# Patient Record
Sex: MALE | Race: WHITE | NOT HISPANIC OR LATINO | Employment: OTHER | URBAN - METROPOLITAN AREA
[De-identification: names, ages, dates, MRNs, and addresses within clinical notes are randomized per-mention and may not be internally consistent; named-entity substitution may affect disease eponyms.]

---

## 2017-04-06 ENCOUNTER — OFFICE VISIT (OUTPATIENT)
Dept: PODIATRY | Facility: CLINIC | Age: 72
End: 2017-04-06
Payer: MEDICARE

## 2017-04-06 VITALS — BODY MASS INDEX: 31.24 KG/M2 | HEIGHT: 74 IN | WEIGHT: 243.38 LBS

## 2017-04-06 DIAGNOSIS — L84 CORN OR CALLUS: Primary | ICD-10-CM

## 2017-04-06 DIAGNOSIS — M77.9 CAPSULITIS: ICD-10-CM

## 2017-04-06 DIAGNOSIS — M79.672 FOOT PAIN, LEFT: ICD-10-CM

## 2017-04-06 PROCEDURE — 1159F MED LIST DOCD IN RCRD: CPT | Mod: S$GLB,,, | Performed by: PODIATRIST

## 2017-04-06 PROCEDURE — 1157F ADVNC CARE PLAN IN RCRD: CPT | Mod: S$GLB,,, | Performed by: PODIATRIST

## 2017-04-06 PROCEDURE — 1126F AMNT PAIN NOTED NONE PRSNT: CPT | Mod: S$GLB,,, | Performed by: PODIATRIST

## 2017-04-06 PROCEDURE — 99999 PR PBB SHADOW E&M-EST. PATIENT-LVL II: CPT | Mod: PBBFAC,,, | Performed by: PODIATRIST

## 2017-04-06 PROCEDURE — 99213 OFFICE O/P EST LOW 20 MIN: CPT | Mod: S$GLB,,, | Performed by: PODIATRIST

## 2017-04-06 PROCEDURE — 1160F RVW MEDS BY RX/DR IN RCRD: CPT | Mod: S$GLB,,, | Performed by: PODIATRIST

## 2017-04-06 RX ORDER — AMMONIUM LACTATE 12 G/100G
1 CREAM TOPICAL 2 TIMES DAILY
Qty: 140 G | Refills: 11 | Status: SHIPPED | OUTPATIENT
Start: 2017-04-06 | End: 2020-01-01

## 2017-04-06 NOTE — MR AVS SNAPSHOT
[facility]  [address] [place] [address]  Phone: [phone]                  Anson Do   2017 9:45 AM   Office Visit    Description:  Male : 1945   Provider:  Nikita Kerns DPM   Department:  [facility]           Reason for Visit     Callouses           Diagnoses this Visit        Comments    Corn or callus    -  Primary     Capsulitis         Foot pain, left                To Do List           Goals (5 Years of Data)     None      Follow-Up and Disposition     Return if symptoms worsen or fail to improve.       These Medications        Disp Refills Start End    ammonium lactate 12 % Crea 140 g 11 2017     Apply 1 application topically 2 (two) times daily. - Topical (Top)    Pharmacy: [id number] - [address] #: [phone]         [facility]     [facility] On Call Nurse Care Line -  Assistance  Unless otherwise directed by your provider, please contact [facility], our nurse care line that is available for  assistance.     Registered nurses in the [facility] On Call Center provide: appointment scheduling, clinical advisement, health education, and other advisory services.  Call: [phone] (toll free)               Medications           Message regarding Medications     Verify the changes and/or additions to your medication regime listed below are the same as discussed with your clinician today.  If any of these changes or additions are incorrect, please notify your healthcare provider.        START taking these NEW medications        Refills    ammonium lactate 12 % Crea 11    Sig: Apply 1 application topically 2 (two) times daily.    Class: Normal    Route: Topical (Top)      STOP taking these medications     meloxicam (MOBIC) 7.5 MG tablet     diclofenac sodium 1 % Gel Apply 2 g topically 4 (four) times daily.    econazole nitrate 1 % cream            Verify that the below list of medications is an  "accurate representation of the medications you are currently taking.  If none reported, the list may be blank. If incorrect, please contact your healthcare provider. Carry this list with you in case of emergency.           Current Medications     fexofenadine (ALLEGRA) 180 MG tablet Take 180 mg by mouth once daily.    ammonium lactate 12 % Crea Apply 1 application topically 2 (two) times daily.           Clinical Reference Information           Your Vitals Were     Height Weight BMI          6' 2" (1.88 m) 110.4 kg (243 lb 6.2 oz) 31.25 kg/m2        Allergies as of 4/6/2017     No Known Allergies      Immunizations Administered on Date of Encounter - 4/6/2017     None      Language Assistance Services     ATTENTION: Language assistance services are available, free of charge. Please call [phone].      ATENCIÓN: Si noemi fernando, tiene a corbin disposición servicios gratuitos de asistencia lingüística. Llame al [phone].     ERON Ý: N?u b?n nói Ti?ng Vi?t, có các d?ch v? h? tr? ngôn ng? mi?n phí dành cho b?n. G?i s? [phone].         [facility] complies with applicable Federal civil rights laws and does not discriminate on the basis of race, color, national origin, age, disability, or sex.        "

## 2017-04-06 NOTE — PROGRESS NOTES
Subjective:      Patient ID: Anson Do is a 71 y.o. male.    Chief Complaint: Callouses (left)    Sharp deep pain bottom left forefoot.  Gradual onset, worsening over past several weeks, aggravated by increased weight bearing, shoe gear, pressure.  No previous medical treatment.  OTC pain med not helping.      Review of Systems   Constitution: Negative for chills, diaphoresis, fever, malaise/fatigue and night sweats.   Cardiovascular: Negative for claudication, cyanosis, leg swelling and syncope.   Skin: Negative for color change, dry skin, rash, suspicious lesions and unusual hair distribution.   Musculoskeletal: Positive for joint pain. Negative for falls, joint swelling, muscle cramps, muscle weakness and stiffness.   Gastrointestinal: Negative for constipation, diarrhea, nausea and vomiting.   Neurological: Negative for brief paralysis, disturbances in coordination, focal weakness, numbness, paresthesias, sensory change and tremors.           Objective:      Physical Exam   Constitutional: He appears well-developed and well-nourished. He is cooperative. No distress.   Cardiovascular:   Pulses:       Popliteal pulses are 2+ on the right side, and 2+ on the left side.        Dorsalis pedis pulses are 1+ on the right side, and 1+ on the left side.        Posterior tibial pulses are 1+ on the right side, and 1+ on the left side.   Capillary refill 3 seconds all toes/distal feet, all toes/both feet warm to touch.      Negative lymphadenopathy bilateral popliteal fossa and tarsal tunnel.      Negavie lower extremity edema bilateral.     Musculoskeletal:        Right ankle: Normal. He exhibits normal range of motion, no swelling, no ecchymosis, no deformity, no laceration and normal pulse. Achilles tendon normal. Achilles tendon exhibits no pain, no defect and normal Langford's test results.   Sharp deep pain to palpation inferior 5th mtpj left without ecchymosis, erythema, edema, or cardinal signs infection, and no  signs of trauma.    Pain to palpation inferior mtpj 5 left without evidence of trauma or infection.       Ankle dorsiflexion decreased at <10 degrees bilateral with moderate increase with knee flexion bilateral.      Otherwise, .Normal angle, base, station of gait. All ten toes without clubbing, cyanosis, or signs of ischemia.  No pain to palpation bilateral lower extremities.  Range of motion, stability, muscle strength, and muscle tone normal bilateral feet and legs.     Lymphadenopathy: No inguinal adenopathy noted on the right or left side.   Negative lymphadenopathy bilateral popliteal fossa and tarsal tunnel.   Neurological: He is alert. He has normal strength. He displays no atrophy and no tremor. No sensory deficit. He exhibits normal muscle tone. He displays no seizure activity. Gait normal.   Reflex Scores:       Patellar reflexes are 2+ on the right side and 2+ on the left side.       Achilles reflexes are 2+ on the right side and 2+ on the left side.  Negative tinel sign to percussion sural, superficial peroneal, deep peroneal, saphenous, and posterior tibial nerves right and left ankles and feet.     Skin: Skin is warm, dry and intact. No abrasion, no bruising, no burn, no ecchymosis, no laceration, no lesion and no rash noted. He is not diaphoretic. No cyanosis or erythema. No pallor. Nails show no clubbing.   Focal hyperkeratotic lesion with minimal/moderate build up consisting entirely of hyperkeratotic tissue without open skin, drainage, pus, fluctuance, malodor, or signs of infection      Skin is normal age and health appropriate color, turgor, texture, and temperature bilateral lower extremities without ulceration, hyperpigmentation, discoloration, masses nodules or cords palpated.  No ecchymosis, erythema, edema, or cardinal signs of infection bilateral lower extremities.               Assessment:       Encounter Diagnoses   Name Primary?    Corn or callus Yes    Capsulitis     Foot pain, left           Plan:       Anson COOLEY was seen today for callouses.    Diagnoses and all orders for this visit:    Corn or callus    Capsulitis    Foot pain, left    Other orders  -     ammonium lactate 12 % Crea; Apply 1 application topically 2 (two) times daily.      I counseled the patient on his conditions, their implications and medical management.        Patient will stretch the tendo achilles complex three times daily as demonstrated in the office.  Literature was dispensed illustrating proper stretching technique.    Declines strapping.    Discussed conservative treatment with shoes of adequate dimensions, material, and style to alleviate symptoms and delay or prevent surgical intervention.      Rx lachydrin    Patient will obtain over the counter arch supports and wear them in shoes whenever possible.  Athletic shoes intended for walking or running are usually best.            Return if symptoms worsen or fail to improve.

## 2017-06-21 ENCOUNTER — OFFICE VISIT (OUTPATIENT)
Dept: PODIATRY | Facility: CLINIC | Age: 72
End: 2017-06-21
Payer: MEDICARE

## 2017-06-21 VITALS — WEIGHT: 239.88 LBS | HEIGHT: 74 IN | BODY MASS INDEX: 30.79 KG/M2

## 2017-06-21 DIAGNOSIS — M79.671 FOOT PAIN, RIGHT: ICD-10-CM

## 2017-06-21 DIAGNOSIS — M10.9 ACUTE GOUT INVOLVING TOE OF RIGHT FOOT, UNSPECIFIED CAUSE: Primary | ICD-10-CM

## 2017-06-21 PROCEDURE — 99999 PR PBB SHADOW E&M-EST. PATIENT-LVL III: CPT | Mod: PBBFAC,,, | Performed by: PODIATRIST

## 2017-06-21 PROCEDURE — 1125F AMNT PAIN NOTED PAIN PRSNT: CPT | Mod: S$GLB,,, | Performed by: PODIATRIST

## 2017-06-21 PROCEDURE — 1159F MED LIST DOCD IN RCRD: CPT | Mod: S$GLB,,, | Performed by: PODIATRIST

## 2017-06-21 PROCEDURE — 99213 OFFICE O/P EST LOW 20 MIN: CPT | Mod: S$GLB,,, | Performed by: PODIATRIST

## 2017-06-21 RX ORDER — METHYLPREDNISOLONE 4 MG/1
TABLET ORAL
Qty: 1 PACKAGE | Refills: 0 | Status: SHIPPED | OUTPATIENT
Start: 2017-06-21 | End: 2019-01-01

## 2017-06-21 NOTE — PROGRESS NOTES
Reviewed resident note, exam and procedures were performed under my direct supervision.  Agree with note and care.  Discrepancies discussed.    Declines injection/xray today.    Rx medrol.    Will reappoint if injection desired.

## 2017-06-21 NOTE — PROGRESS NOTES
Subjective:      Patient ID: Anson Do is a 71 y.o. male.    Chief Complaint: Foot Problem (gout - right foot) and Foot Pain (right foot)    Sharp deep pain right great toe.  Sudden onset about a week ago, aggravated by increased pressure to the area, shoe gear. He has a history of gout flare ups. Prescribed colchicine previously by his PCP, he has been taking that over the last week, feels it is helping so the pain does not worsen, but the pain is not resolving fully. Also increased his water intake.      Review of Systems   Constitution: Negative for chills, diaphoresis, fever, malaise/fatigue and night sweats.   Cardiovascular: Negative for claudication, cyanosis, leg swelling and syncope.   Skin: Positive for color change. Negative for dry skin, rash, suspicious lesions and unusual hair distribution.   Musculoskeletal: Positive for gout, joint pain and joint swelling. Negative for falls, muscle cramps, muscle weakness and stiffness.   Gastrointestinal: Negative for constipation, diarrhea, nausea and vomiting.   Neurological: Negative for brief paralysis, disturbances in coordination, focal weakness, numbness, paresthesias, sensory change and tremors.           Objective:      Physical Exam   Constitutional: He appears well-developed and well-nourished. He is cooperative. No distress.   Cardiovascular:   Pulses:       Popliteal pulses are 2+ on the right side, and 2+ on the left side.        Dorsalis pedis pulses are 1+ on the right side, and 1+ on the left side.        Posterior tibial pulses are 1+ on the right side, and 1+ on the left side.   Capillary refill 3 seconds all toes/distal feet, all toes/both feet warm to touch.      Negative lymphadenopathy bilateral popliteal fossa and tarsal tunnel.      Negavie lower extremity edema bilateral.     Musculoskeletal:        Right ankle: Normal. He exhibits normal range of motion, no swelling, no ecchymosis, no deformity, no laceration and normal pulse. Achilles  tendon normal. Achilles tendon exhibits no pain, no defect and normal Langford's test results.   Sharp deep pain to palpation right first MTPJ, pain throughout ROM right first MTPJ    Ankle dorsiflexion decreased at <10 degrees bilateral with moderate increase with knee flexion bilateral.      Otherwise, .Normal angle, base, station of gait. All ten toes without clubbing, cyanosis, or signs of ischemia.  No pain to palpation bilateral lower extremities.  Range of motion, stability, muscle strength, and muscle tone normal bilateral feet and legs.     Lymphadenopathy: No inguinal adenopathy noted on the right or left side.   Negative lymphadenopathy bilateral popliteal fossa and tarsal tunnel.   Neurological: He is alert. He has normal strength. He displays no atrophy and no tremor. No sensory deficit. He exhibits normal muscle tone. He displays no seizure activity. Gait normal.   Reflex Scores:       Patellar reflexes are 2+ on the right side and 2+ on the left side.       Achilles reflexes are 2+ on the right side and 2+ on the left side.  Negative tinel sign to percussion sural, superficial peroneal, deep peroneal, saphenous, and posterior tibial nerves right and left ankles and feet.     Skin: Skin is warm, dry and intact. No abrasion, no bruising, no burn, no ecchymosis, no laceration, no lesion and no rash noted. He is not diaphoretic. There is erythema. No cyanosis. No pallor. Nails show no clubbing.   Focal erythema and edema surrounding right first MTPJ.    Otherwise, Skin is normal age and health appropriate color, turgor, texture, and temperature bilateral lower extremities without ulceration, hyperpigmentation, discoloration, masses nodules or cords palpated.  No ecchymosis, erythema, edema, or cardinal signs of infection bilateral lower extremities.           Assessment:       Encounter Diagnoses   Name Primary?    Acute gout involving toe of right foot, unspecified cause - Right Foot Yes    Foot pain,  right          Plan:       Anson COOLEY was seen today for foot problem and foot pain.    Diagnoses and all orders for this visit:    Acute gout involving toe of right foot, unspecified cause - Right Foot    Foot pain, right    Other orders  -     methylPREDNISolone (MEDROL DOSEPACK) 4 mg tablet; use as directed      I counseled the patient on his conditions, their implications and medical management.    The nature of gout is fully explained, including dietary relationship, acute and interval phase and treatment of both. Long term complications such as kidney stones, tophi and arthritis are discussed. Avoidance of alcohol recommended. Indications for the use colchicine to prevent or treat flare-ups is also discussed. Proper use of indomethacin for acute attacks discussed, and its side effects. Will add on treatment with medrol dose pack for inflammation and pain. Call if further attacks occur, or this one does not resolve promptly.    Warm packs affected part and limb foot/kd - protect skin against thermal damage with towel/other insulator.    Adequately hydrate up to about 4 liters water unless on fluid restriction.    Avoid purine containing foods - list provided.      Follow up 1 week or sooner PRRADHIKA Mitchell DPM PGY-3

## 2019-01-01 ENCOUNTER — OFFICE VISIT (OUTPATIENT)
Dept: ORTHOPEDICS | Facility: CLINIC | Age: 74
End: 2019-01-01
Payer: MEDICARE

## 2019-01-01 VITALS
BODY MASS INDEX: 32.85 KG/M2 | HEIGHT: 74 IN | SYSTOLIC BLOOD PRESSURE: 123 MMHG | DIASTOLIC BLOOD PRESSURE: 77 MMHG | HEART RATE: 60 BPM | WEIGHT: 256 LBS

## 2019-01-01 VITALS
BODY MASS INDEX: 32.21 KG/M2 | SYSTOLIC BLOOD PRESSURE: 133 MMHG | WEIGHT: 251 LBS | DIASTOLIC BLOOD PRESSURE: 81 MMHG | HEIGHT: 74 IN | HEART RATE: 59 BPM

## 2019-01-01 DIAGNOSIS — M51.36 DISC DEGENERATION, LUMBAR: Primary | ICD-10-CM

## 2019-01-01 DIAGNOSIS — M62.830 LUMBAR PARASPINAL MUSCLE SPASM: ICD-10-CM

## 2019-01-01 DIAGNOSIS — M51.36 DISC DEGENERATION, LUMBAR: ICD-10-CM

## 2019-01-01 PROCEDURE — 99203 PR OFFICE/OUTPT VISIT, NEW, LEVL III, 30-44 MIN: ICD-10-PCS | Mod: 25,S$GLB,, | Performed by: ORTHOPAEDIC SURGERY

## 2019-01-01 PROCEDURE — 1159F PR MEDICATION LIST DOCUMENTED IN MEDICAL RECORD: ICD-10-PCS | Mod: S$GLB,,, | Performed by: ORTHOPAEDIC SURGERY

## 2019-01-01 PROCEDURE — 99213 OFFICE O/P EST LOW 20 MIN: CPT | Mod: S$GLB,,, | Performed by: ORTHOPAEDIC SURGERY

## 2019-01-01 PROCEDURE — 1101F PT FALLS ASSESS-DOCD LE1/YR: CPT | Mod: S$GLB,,, | Performed by: ORTHOPAEDIC SURGERY

## 2019-01-01 PROCEDURE — 1125F PR PAIN SEVERITY QUANTIFIED, PAIN PRESENT: ICD-10-PCS | Mod: S$GLB,,, | Performed by: ORTHOPAEDIC SURGERY

## 2019-01-01 PROCEDURE — 1101F PR PT FALLS ASSESS DOC 0-1 FALLS W/OUT INJ PAST YR: ICD-10-PCS | Mod: S$GLB,,, | Performed by: ORTHOPAEDIC SURGERY

## 2019-01-01 PROCEDURE — 1126F PR PAIN SEVERITY QUANTIFIED, NO PAIN PRESENT: ICD-10-PCS | Mod: S$GLB,,, | Performed by: ORTHOPAEDIC SURGERY

## 2019-01-01 PROCEDURE — 1159F MED LIST DOCD IN RCRD: CPT | Mod: S$GLB,,, | Performed by: ORTHOPAEDIC SURGERY

## 2019-01-01 PROCEDURE — 1125F AMNT PAIN NOTED PAIN PRSNT: CPT | Mod: S$GLB,,, | Performed by: ORTHOPAEDIC SURGERY

## 2019-01-01 PROCEDURE — 1126F AMNT PAIN NOTED NONE PRSNT: CPT | Mod: S$GLB,,, | Performed by: ORTHOPAEDIC SURGERY

## 2019-01-01 PROCEDURE — 99203 OFFICE O/P NEW LOW 30 MIN: CPT | Mod: 25,S$GLB,, | Performed by: ORTHOPAEDIC SURGERY

## 2019-01-01 PROCEDURE — 99213 PR OFFICE/OUTPT VISIT, EST, LEVL III, 20-29 MIN: ICD-10-PCS | Mod: S$GLB,,, | Performed by: ORTHOPAEDIC SURGERY

## 2019-01-01 RX ORDER — CYCLOBENZAPRINE HCL 10 MG
10 TABLET ORAL NIGHTLY
Qty: 30 TABLET | Refills: 0 | Status: SHIPPED | OUTPATIENT
Start: 2019-01-01 | End: 2019-01-01 | Stop reason: SDUPTHER

## 2019-01-01 RX ORDER — MELOXICAM 15 MG/1
15 TABLET ORAL DAILY
Qty: 30 TABLET | Refills: 1 | Status: SHIPPED | OUTPATIENT
Start: 2019-01-01 | End: 2019-01-01

## 2019-01-01 RX ORDER — CYCLOBENZAPRINE HCL 10 MG
10 TABLET ORAL NIGHTLY
Qty: 30 TABLET | Refills: 0 | Status: SHIPPED | OUTPATIENT
Start: 2019-01-01 | End: 2020-01-01

## 2019-11-25 NOTE — PROGRESS NOTES
[facility]    Subjective:     Chief Complaint:   Chief Complaint   Patient presents with    Lumbar Spine - Pain     Lower back pain x July. 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 Pain is off and on, he had surgery years ago. He does have some numbness in the left leg at time, not constant.         Past Medical History:   Diagnosis Date    GERD (gastroesophageal reflux disease)        Past Surgical History:   Procedure Laterality Date    BACK SURGERY      hernina repair         Current Outpatient Medications   Medication Sig    cyclobenzaprine HCl (FLEXERIL ORAL) Take by mouth.    ammonium lactate 12 % Crea Apply 1 application topically 2 (two) times daily. (Patient not taking: Reported on 2019)    COLCHICINE (COLCRYS ORAL) Take by mouth.    fexofenadine (ALLEGRA) 180 MG tablet Take 180 mg by mouth once daily.    methylPREDNISolone (MEDROL DOSEPACK) 4 mg tablet use as directed (Patient not taking: Reported on 2019)     No current facility-administered medications for this visit.        Review of patient's allergies indicates:  No Known Allergies    Family History   Problem Relation Age of Onset    Allergic rhinitis Neg Hx     Allergies Neg Hx     Angioedema Neg Hx     Asthma Neg Hx     Eczema Neg Hx     Immunodeficiency Neg Hx        Social History     Socioeconomic History    Marital status:      Spouse name: Not on file    Number of children: Not on file    Years of education: Not on file    Highest education level: Not on file   Occupational History    Not on file   Social Needs    Financial resource strain: Not on file    Food insecurity:     Worry: Not on file     Inability: Not on file    Transportation needs:     Medical: Not on file     Non-medical: Not on file   Tobacco Use    Smoking status: Former Smoker     Packs/day: 2.00     Last attempt to quit: 1988     Years since quittin.3    Substance and Sexual Activity    Alcohol use: Not on file    Drug use: Not on file    Sexual activity: Not on file   Lifestyle    Physical activity:     Days per week: Not on file     Minutes per session: Not on file    Stress: Not on file   Relationships    Social connections:     Talks on phone: Not on file     Gets together: Not on file     Attends Advent service: Not on file     Active member of club or organization: Not on file     Attends meetings of clubs or organizations: Not on file     Relationship status: Not on file   Other Topics Concern    Not on file   Social History Narrative    Not on file       History of present illness:  Low back pain for few months.  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  This been pain on and off now for few months no radiation to the legs.  He has had 1 prior lumbar surgery long time ago.      Review of Systems:    Constitution: Negative for chills, fever, and sweats.  Negative for unexplained weight loss.    HENT:  Negative for headaches and blurry vision.    Cardiovascular:Negative for chest pain or irregular heart beat. Negative for hypertension.    Respiratory:  Negative for cough and shortness of breath.    Gastrointestinal: Negative for abdominal pain, heartburn, melena, nausea, and vomitting.    Genitourinary:  Negative bladder incontinence and dysuria.    Musculoskeletal:  See HPI for details.     Neurological: Negative for numbness.    Psychiatric/Behavioral: Negative for depression.  The patient is not nervous/anxious.      Endocrine: Negative for polyuria    Hematologic/Lymphatic: Negative for bleeding problem.  Does not bruise/bleed easily.    Skin: Negative for poor would healing and rash    Objective:      Physical Examination:    Vital Signs:    Vitals:    11/25/19 0849   BP: 133/81   Pulse: (!) 59       Body mass index is 32.23 kg/m².    This a well-developed, well nourished patient in no acute distress.  They are  alert and oriented and cooperative to examination.        Physical exam shows that he has lower lumbar scar midline.  Mild tenderness lower lumbar.  Of he has got pretty reasonable range of motion only mild spasm lower lumbar.  Straight leg raise negative UA chills good on both sides.  No antalgic gait  Pertinent New Results:    XRAY Report / Interpretation:   AP lateral lumbar shows there is flattening of the lumbar lordosis.  There is very significant collapse at L5-S1 with no listhesis.  There is moderate collapse and spurring at L4-5 and lesser at L3-4 and L2-3.  There is no listhesis at any level.  Further clearly are degenerative changes in those levels lumbar.  L5-S1 collapse may represent an old diskectomy.  No acute findings.  Signature    Assessment/Plan:      So my impression is low back pain without radiculopathy.  Our plan is Mobic 15 of Flexeril physical therapy modification activities.  He 75 retired.  See him back in a month.      This note was created using Dragon voice recognition software that occasionally misinterpreted phrases or words.

## 2019-12-23 NOTE — PROGRESS NOTES
[facility]    Subjective:     Chief Complaint:   Chief Complaint   Patient presents with    Lumbar Spine - Pain     L spine pain/ PT f/u. States that his back is doing better, States that PT helped.        Past Medical History:   Diagnosis Date    GERD (gastroesophageal reflux disease)        Past Surgical History:   Procedure Laterality Date    BACK SURGERY  1975    hernina repair         Current Outpatient Medications   Medication Sig    ammonium lactate 12 % Crea Apply 1 application topically 2 (two) times daily.    cyclobenzaprine (FLEXERIL) 10 MG tablet Take 1 tablet (10 mg total) by mouth every evening.    fexofenadine (ALLEGRA) 180 MG tablet Take 180 mg by mouth once daily.    meloxicam (MOBIC) 15 MG tablet Take 1 tablet (15 mg total) by mouth once daily.    COLCHICINE (COLCRYS ORAL) Take by mouth.    cyclobenzaprine HCl (FLEXERIL ORAL) Take by mouth.     No current facility-administered medications for this visit.        Review of patient's allergies indicates:  No Known Allergies    Family History   Problem Relation Age of Onset    Allergic rhinitis Neg Hx     Allergies Neg Hx     Angioedema Neg Hx     Asthma Neg Hx     Eczema Neg Hx     Immunodeficiency Neg Hx        Social History     Socioeconomic History    Marital status:      Spouse name: Not on file    Number of children: Not on file    Years of education: Not on file    Highest education level: Not on file   Occupational History    Not on file   Social Needs    Financial resource strain: Not on file    Food insecurity:     Worry: Not on file     Inability: Not on file    Transportation needs:     Medical: Not on file     Non-medical: Not on file   Tobacco Use    Smoking status: Former Smoker     Packs/day: 2.00     Last attempt to quit: 1988     Years since quittin.4   Substance and Sexual Activity    Alcohol use: Not on file    Drug use: Not on file    Sexual activity: Not on file   Lifestyle     Physical activity:     Days per week: Not on file     Minutes per session: Not on file    Stress: Not on file   Relationships    Social connections:     Talks on phone: Not on file     Gets together: Not on file     Attends Mormonism service: Not on file     Active member of club or organization: Not on file     Attends meetings of clubs or organizations: Not on file     Relationship status: Not on file   Other Topics Concern    Not on file   Social History Narrative    Not on file       History of present illness follow-up of lumbar spine.  He is doing very well at this point no leg pain no back pain moving well  Review of Systems:    Constitution: Negative for chills, fever, and sweats.  Negative for unexplained weight loss.    HENT:  Negative for headaches and blurry vision.    Cardiovascular:Negative for chest pain or irregular heart beat. Negative for hypertension.    Respiratory:  Negative for cough and shortness of breath.    Gastrointestinal: Negative for abdominal pain, heartburn, melena, nausea, and vomitting.    Genitourinary:  Negative bladder incontinence and dysuria.    Musculoskeletal:  See HPI for details.     Neurological: Negative for numbness.    Psychiatric/Behavioral: Negative for depression.  The patient is not nervous/anxious.      Endocrine: Negative for polyuria    Hematologic/Lymphatic: Negative for bleeding problem.  Does not bruise/bleed easily.    Skin: Negative for poor would healing and rash    Objective:      Physical Examination:    Vital Signs:    Vitals:    12/23/19 0840   BP: 123/77   Pulse: 60       Body mass index is 32.87 kg/m².    This a well-developed, well nourished patient in no acute distress.  They are alert and oriented and cooperative to examination.      So the full exam shows negative straight leg raise really no tenderness lumbar good motion lumbar normal gait.  Pertinent New Results:    XRAY Report / Interpretation:       Assessment/Plan:      So impression  is he is doing well after his lumbar flare up.  He will go back to doing his usual things but course has a risk for flare-ups.  We have Flexeril available for flare-ups.  Work not an issue per se.  See him back p.r.n..      This note was created using Dragon voice recognition software that occasionally misinterpreted phrases or words.

## 2020-01-01 ENCOUNTER — HOSPITAL ENCOUNTER (OUTPATIENT)
Dept: RADIOLOGY | Facility: HOSPITAL | Age: 75
Discharge: HOME | End: 2020-05-20
Attending: SURGERY | Admitting: SURGERY
Payer: MEDICARE

## 2020-01-01 ENCOUNTER — HOSPITAL ENCOUNTER (OUTPATIENT)
Facility: HOSPITAL | Age: 75
Discharge: HOME | End: 2020-05-04
Attending: INTERNAL MEDICINE | Admitting: INTERNAL MEDICINE
Payer: MEDICARE

## 2020-01-01 ENCOUNTER — OFFICE VISIT (OUTPATIENT)
Dept: PULMONOLOGY | Facility: CLINIC | Age: 75
End: 2020-01-01
Payer: MEDICARE

## 2020-01-01 ENCOUNTER — TELEPHONE (OUTPATIENT)
Dept: HEMATOLOGY/ONCOLOGY | Facility: CLINIC | Age: 75
End: 2020-01-01

## 2020-01-01 ENCOUNTER — ANESTHESIA EVENT (OUTPATIENT)
Dept: SURGERY | Facility: HOSPITAL | Age: 75
End: 2020-01-01
Payer: MEDICARE

## 2020-01-01 ENCOUNTER — TELEPHONE (OUTPATIENT)
Dept: SURGERY | Facility: CLINIC | Age: 75
End: 2020-01-01

## 2020-01-01 ENCOUNTER — PATIENT MESSAGE (OUTPATIENT)
Dept: HEMATOLOGY/ONCOLOGY | Facility: CLINIC | Age: 75
End: 2020-01-01

## 2020-01-01 ENCOUNTER — OFFICE VISIT (OUTPATIENT)
Dept: HEMATOLOGY/ONCOLOGY | Facility: CLINIC | Age: 75
End: 2020-01-01
Payer: MEDICARE

## 2020-01-01 ENCOUNTER — HOSPITAL ENCOUNTER (OUTPATIENT)
Dept: RADIOLOGY | Facility: HOSPITAL | Age: 75
Discharge: HOME | End: 2020-06-22
Attending: NURSE PRACTITIONER
Payer: MEDICARE

## 2020-01-01 ENCOUNTER — TELEPHONE (OUTPATIENT)
Dept: RADIOLOGY | Facility: HOSPITAL | Age: 75
End: 2020-01-01

## 2020-01-01 ENCOUNTER — INFUSION (OUTPATIENT)
Dept: INFUSION THERAPY | Facility: HOSPITAL | Age: 75
End: 2020-01-01
Attending: INTERNAL MEDICINE
Payer: MEDICARE

## 2020-01-01 ENCOUNTER — ANESTHESIA (OUTPATIENT)
Dept: SURGERY | Facility: HOSPITAL | Age: 75
End: 2020-01-01
Payer: MEDICARE

## 2020-01-01 ENCOUNTER — TELEPHONE (OUTPATIENT)
Dept: PULMONOLOGY | Facility: CLINIC | Age: 75
End: 2020-01-01

## 2020-01-01 ENCOUNTER — PATIENT MESSAGE (OUTPATIENT)
Dept: PULMONOLOGY | Facility: CLINIC | Age: 75
End: 2020-01-01

## 2020-01-01 ENCOUNTER — INFUSION (OUTPATIENT)
Dept: INFUSION THERAPY | Facility: HOSPITAL | Age: 75
End: 2020-01-01
Attending: NURSE PRACTITIONER
Payer: MEDICARE

## 2020-01-01 ENCOUNTER — LAB VISIT (OUTPATIENT)
Dept: LAB | Facility: HOSPITAL | Age: 75
End: 2020-01-01
Attending: INTERNAL MEDICINE
Payer: MEDICARE

## 2020-01-01 ENCOUNTER — CLINICAL SUPPORT (OUTPATIENT)
Dept: HEMATOLOGY/ONCOLOGY | Facility: CLINIC | Age: 75
End: 2020-01-01
Payer: MEDICARE

## 2020-01-01 ENCOUNTER — LAB VISIT (OUTPATIENT)
Dept: LAB | Facility: HOSPITAL | Age: 75
End: 2020-01-01
Attending: NURSE PRACTITIONER
Payer: MEDICARE

## 2020-01-01 ENCOUNTER — HOSPITAL ENCOUNTER (OUTPATIENT)
Dept: RADIOLOGY | Facility: HOSPITAL | Age: 75
Discharge: HOME | End: 2020-07-22
Attending: INTERNAL MEDICINE
Payer: MEDICARE

## 2020-01-01 ENCOUNTER — HOSPITAL ENCOUNTER (OUTPATIENT)
Facility: HOSPITAL | Age: 75
Discharge: HOME | End: 2020-08-23
Attending: EMERGENCY MEDICINE | Admitting: INTERNAL MEDICINE
Payer: MEDICARE

## 2020-01-01 ENCOUNTER — OFFICE VISIT (OUTPATIENT)
Dept: ORTHOPEDICS | Facility: CLINIC | Age: 75
End: 2020-01-01
Payer: MEDICARE

## 2020-01-01 ENCOUNTER — HOSPITAL ENCOUNTER (OUTPATIENT)
Facility: HOSPITAL | Age: 75
Discharge: HOME | End: 2020-06-18
Attending: EMERGENCY MEDICINE | Admitting: INTERNAL MEDICINE
Payer: MEDICARE

## 2020-01-01 ENCOUNTER — TELEPHONE (OUTPATIENT)
Dept: PULMONOLOGY | Facility: HOSPITAL | Age: 75
End: 2020-01-01

## 2020-01-01 ENCOUNTER — HOSPITAL ENCOUNTER (OUTPATIENT)
Dept: PREADMISSION TESTING | Facility: HOSPITAL | Age: 75
Discharge: HOME | End: 2020-05-27
Attending: RADIOLOGY
Payer: MEDICARE

## 2020-01-01 ENCOUNTER — OFFICE VISIT (OUTPATIENT)
Dept: PRIMARY CARE CLINIC | Facility: CLINIC | Age: 75
End: 2020-01-01
Payer: MEDICARE

## 2020-01-01 ENCOUNTER — HOSPITAL ENCOUNTER (OUTPATIENT)
Dept: RADIOLOGY | Facility: HOSPITAL | Age: 75
Discharge: HOME | End: 2020-08-12
Attending: INTERNAL MEDICINE
Payer: MEDICARE

## 2020-01-01 ENCOUNTER — HOSPITAL ENCOUNTER (OUTPATIENT)
Facility: HOSPITAL | Age: 75
Discharge: HOME | End: 2020-04-21
Attending: EMERGENCY MEDICINE | Admitting: INTERNAL MEDICINE
Payer: MEDICARE

## 2020-01-01 ENCOUNTER — HOSPITAL ENCOUNTER (OUTPATIENT)
Dept: RADIOLOGY | Facility: HOSPITAL | Age: 75
Discharge: HOME | End: 2020-04-23
Attending: INTERNAL MEDICINE
Payer: MEDICARE

## 2020-01-01 ENCOUNTER — OFFICE VISIT (OUTPATIENT)
Dept: SURGERY | Facility: CLINIC | Age: 75
End: 2020-01-01
Payer: MEDICARE

## 2020-01-01 ENCOUNTER — TELEPHONE (OUTPATIENT)
Dept: INFUSION THERAPY | Facility: HOSPITAL | Age: 75
End: 2020-01-01

## 2020-01-01 ENCOUNTER — OFFICE VISIT (OUTPATIENT)
Dept: RADIATION ONCOLOGY | Facility: CLINIC | Age: 75
End: 2020-01-01
Payer: MEDICARE

## 2020-01-01 ENCOUNTER — HOSPITAL ENCOUNTER (OUTPATIENT)
Dept: RADIOLOGY | Facility: HOSPITAL | Age: 75
Discharge: HOME | End: 2020-05-14
Attending: NURSE PRACTITIONER
Payer: MEDICARE

## 2020-01-01 ENCOUNTER — HOSPITAL ENCOUNTER (OUTPATIENT)
Facility: HOSPITAL | Age: 75
Discharge: HOME | End: 2020-07-27
Attending: INTERNAL MEDICINE | Admitting: INTERNAL MEDICINE
Payer: MEDICARE

## 2020-01-01 ENCOUNTER — HOSPITAL ENCOUNTER (OUTPATIENT)
Dept: RADIOLOGY | Facility: HOSPITAL | Age: 75
Discharge: HOME | End: 2020-05-04
Attending: SURGERY
Payer: MEDICARE

## 2020-01-01 ENCOUNTER — HOSPITAL ENCOUNTER (OUTPATIENT)
Dept: RADIOLOGY | Facility: HOSPITAL | Age: 75
Discharge: HOME | End: 2020-04-28
Attending: INTERNAL MEDICINE
Payer: MEDICARE

## 2020-01-01 ENCOUNTER — HOSPITAL ENCOUNTER (OUTPATIENT)
Dept: PREADMISSION TESTING | Facility: HOSPITAL | Age: 75
Discharge: HOME | End: 2020-05-18
Attending: SURGERY
Payer: MEDICARE

## 2020-01-01 ENCOUNTER — HOSPITAL ENCOUNTER (EMERGENCY)
Facility: HOSPITAL | Age: 75
Discharge: HOME | End: 2020-04-23
Attending: EMERGENCY MEDICINE
Payer: MEDICARE

## 2020-01-01 ENCOUNTER — HOSPITAL ENCOUNTER (OUTPATIENT)
Dept: RADIOLOGY | Facility: HOSPITAL | Age: 75
Discharge: HOME | End: 2020-09-18
Attending: INTERNAL MEDICINE
Payer: MEDICARE

## 2020-01-01 ENCOUNTER — HOSPITAL ENCOUNTER (OUTPATIENT)
Dept: RADIOLOGY | Facility: HOSPITAL | Age: 75
Discharge: HOME | End: 2020-06-17
Attending: ORTHOPAEDIC SURGERY
Payer: MEDICARE

## 2020-01-01 ENCOUNTER — SOCIAL WORK (OUTPATIENT)
Dept: HEMATOLOGY/ONCOLOGY | Facility: CLINIC | Age: 75
End: 2020-01-01

## 2020-01-01 ENCOUNTER — HOSPITAL ENCOUNTER (OUTPATIENT)
Dept: RADIOLOGY | Facility: HOSPITAL | Age: 75
Discharge: HOME | End: 2020-09-01
Attending: INTERNAL MEDICINE
Payer: MEDICARE

## 2020-01-01 ENCOUNTER — HOSPITAL ENCOUNTER (OUTPATIENT)
Facility: HOSPITAL | Age: 75
Discharge: HOME | End: 2020-05-20
Attending: SURGERY | Admitting: SURGERY
Payer: MEDICARE

## 2020-01-01 ENCOUNTER — NURSE TRIAGE (OUTPATIENT)
Dept: ADMINISTRATIVE | Facility: CLINIC | Age: 75
End: 2020-01-01

## 2020-01-01 ENCOUNTER — HOSPITAL ENCOUNTER (OUTPATIENT)
Facility: HOSPITAL | Age: 75
Discharge: HOME | End: 2020-05-29
Attending: INTERNAL MEDICINE | Admitting: INTERNAL MEDICINE
Payer: MEDICARE

## 2020-01-01 ENCOUNTER — HOSPITAL ENCOUNTER (EMERGENCY)
Facility: HOSPITAL | Age: 75
Discharge: HOME | End: 2020-05-01
Attending: EMERGENCY MEDICINE
Payer: MEDICARE

## 2020-01-01 VITALS
DIASTOLIC BLOOD PRESSURE: 63 MMHG | OXYGEN SATURATION: 97 % | HEIGHT: 74 IN | HEIGHT: 74 IN | RESPIRATION RATE: 14 BRPM | WEIGHT: 234 LBS | OXYGEN SATURATION: 96 % | BODY MASS INDEX: 30.03 KG/M2 | HEART RATE: 55 BPM | TEMPERATURE: 98 F | BODY MASS INDEX: 31.39 KG/M2 | HEART RATE: 56 BPM | WEIGHT: 244.63 LBS | DIASTOLIC BLOOD PRESSURE: 57 MMHG | SYSTOLIC BLOOD PRESSURE: 103 MMHG | TEMPERATURE: 98 F | RESPIRATION RATE: 16 BRPM | SYSTOLIC BLOOD PRESSURE: 123 MMHG

## 2020-01-01 VITALS
RESPIRATION RATE: 18 BRPM | WEIGHT: 244 LBS | HEART RATE: 55 BPM | TEMPERATURE: 97 F | HEART RATE: 76 BPM | WEIGHT: 241.69 LBS | DIASTOLIC BLOOD PRESSURE: 73 MMHG | SYSTOLIC BLOOD PRESSURE: 110 MMHG | TEMPERATURE: 98 F | BODY MASS INDEX: 31.33 KG/M2 | SYSTOLIC BLOOD PRESSURE: 127 MMHG | DIASTOLIC BLOOD PRESSURE: 83 MMHG | BODY MASS INDEX: 31.03 KG/M2

## 2020-01-01 VITALS
WEIGHT: 237.63 LBS | DIASTOLIC BLOOD PRESSURE: 67 MMHG | SYSTOLIC BLOOD PRESSURE: 134 MMHG | TEMPERATURE: 98 F | HEART RATE: 60 BPM | BODY MASS INDEX: 30.5 KG/M2 | RESPIRATION RATE: 18 BRPM | DIASTOLIC BLOOD PRESSURE: 85 MMHG | TEMPERATURE: 98 F | BODY MASS INDEX: 31.18 KG/M2 | HEART RATE: 67 BPM | HEIGHT: 74 IN | WEIGHT: 243 LBS | OXYGEN SATURATION: 97 % | RESPIRATION RATE: 18 BRPM | HEIGHT: 74 IN | OXYGEN SATURATION: 94 % | SYSTOLIC BLOOD PRESSURE: 164 MMHG

## 2020-01-01 VITALS
WEIGHT: 245.63 LBS | RESPIRATION RATE: 20 BRPM | SYSTOLIC BLOOD PRESSURE: 133 MMHG | HEART RATE: 57 BPM | HEIGHT: 74 IN | TEMPERATURE: 98 F | BODY MASS INDEX: 31.52 KG/M2 | DIASTOLIC BLOOD PRESSURE: 74 MMHG

## 2020-01-01 VITALS
SYSTOLIC BLOOD PRESSURE: 118 MMHG | WEIGHT: 240 LBS | BODY MASS INDEX: 30.82 KG/M2 | SYSTOLIC BLOOD PRESSURE: 107 MMHG | TEMPERATURE: 98 F | RESPIRATION RATE: 12 BRPM | BODY MASS INDEX: 29.9 KG/M2 | SYSTOLIC BLOOD PRESSURE: 122 MMHG | DIASTOLIC BLOOD PRESSURE: 73 MMHG | BODY MASS INDEX: 31.02 KG/M2 | DIASTOLIC BLOOD PRESSURE: 74 MMHG | HEART RATE: 61 BPM | DIASTOLIC BLOOD PRESSURE: 63 MMHG | WEIGHT: 241.63 LBS | HEART RATE: 60 BPM | HEIGHT: 74 IN | HEART RATE: 63 BPM | WEIGHT: 233 LBS | RESPIRATION RATE: 20 BRPM | TEMPERATURE: 98 F

## 2020-01-01 VITALS
RESPIRATION RATE: 22 BRPM | HEART RATE: 68 BPM | WEIGHT: 246 LBS | TEMPERATURE: 98 F | HEIGHT: 74 IN | WEIGHT: 248 LBS | OXYGEN SATURATION: 96 % | SYSTOLIC BLOOD PRESSURE: 120 MMHG | OXYGEN SATURATION: 96 % | HEART RATE: 69 BPM | SYSTOLIC BLOOD PRESSURE: 116 MMHG | DIASTOLIC BLOOD PRESSURE: 78 MMHG | TEMPERATURE: 98 F | WEIGHT: 240.31 LBS | BODY MASS INDEX: 31.57 KG/M2 | TEMPERATURE: 98 F | BODY MASS INDEX: 30.84 KG/M2 | OXYGEN SATURATION: 94 % | SYSTOLIC BLOOD PRESSURE: 125 MMHG | RESPIRATION RATE: 16 BRPM | HEART RATE: 73 BPM | DIASTOLIC BLOOD PRESSURE: 67 MMHG | HEIGHT: 74 IN | DIASTOLIC BLOOD PRESSURE: 80 MMHG | BODY MASS INDEX: 31.84 KG/M2

## 2020-01-01 VITALS
OXYGEN SATURATION: 94 % | RESPIRATION RATE: 16 BRPM | TEMPERATURE: 98 F | HEIGHT: 74 IN | WEIGHT: 234 LBS | SYSTOLIC BLOOD PRESSURE: 122 MMHG | BODY MASS INDEX: 30.03 KG/M2 | HEART RATE: 58 BPM | DIASTOLIC BLOOD PRESSURE: 76 MMHG

## 2020-01-01 VITALS
WEIGHT: 226.13 LBS | DIASTOLIC BLOOD PRESSURE: 77 MMHG | SYSTOLIC BLOOD PRESSURE: 116 MMHG | BODY MASS INDEX: 29.03 KG/M2 | TEMPERATURE: 99 F | HEART RATE: 76 BPM

## 2020-01-01 VITALS
SYSTOLIC BLOOD PRESSURE: 130 MMHG | TEMPERATURE: 98 F | HEART RATE: 52 BPM | DIASTOLIC BLOOD PRESSURE: 88 MMHG | OXYGEN SATURATION: 94 % | RESPIRATION RATE: 18 BRPM | WEIGHT: 242.38 LBS | BODY MASS INDEX: 31.11 KG/M2 | HEIGHT: 74 IN

## 2020-01-01 VITALS
WEIGHT: 242.19 LBS | SYSTOLIC BLOOD PRESSURE: 111 MMHG | HEART RATE: 60 BPM | TEMPERATURE: 98 F | BODY MASS INDEX: 31.1 KG/M2 | DIASTOLIC BLOOD PRESSURE: 72 MMHG | RESPIRATION RATE: 12 BRPM

## 2020-01-01 VITALS
TEMPERATURE: 98 F | RESPIRATION RATE: 19 BRPM | WEIGHT: 242 LBS | SYSTOLIC BLOOD PRESSURE: 118 MMHG | BODY MASS INDEX: 31.07 KG/M2 | HEART RATE: 62 BPM | DIASTOLIC BLOOD PRESSURE: 72 MMHG

## 2020-01-01 VITALS
WEIGHT: 241 LBS | TEMPERATURE: 99 F | DIASTOLIC BLOOD PRESSURE: 75 MMHG | HEIGHT: 74 IN | BODY MASS INDEX: 30.93 KG/M2 | HEART RATE: 71 BPM | SYSTOLIC BLOOD PRESSURE: 124 MMHG

## 2020-01-01 VITALS
BODY MASS INDEX: 31.44 KG/M2 | RESPIRATION RATE: 16 BRPM | TEMPERATURE: 99 F | HEART RATE: 52 BPM | DIASTOLIC BLOOD PRESSURE: 63 MMHG | SYSTOLIC BLOOD PRESSURE: 98 MMHG | WEIGHT: 244.88 LBS

## 2020-01-01 VITALS
DIASTOLIC BLOOD PRESSURE: 73 MMHG | TEMPERATURE: 98 F | SYSTOLIC BLOOD PRESSURE: 103 MMHG | HEART RATE: 60 BPM | RESPIRATION RATE: 20 BRPM | BODY MASS INDEX: 31.7 KG/M2 | WEIGHT: 247 LBS | HEIGHT: 74 IN

## 2020-01-01 VITALS
HEART RATE: 57 BPM | DIASTOLIC BLOOD PRESSURE: 70 MMHG | BODY MASS INDEX: 31.58 KG/M2 | OXYGEN SATURATION: 96 % | SYSTOLIC BLOOD PRESSURE: 120 MMHG | WEIGHT: 246 LBS | TEMPERATURE: 97 F

## 2020-01-01 VITALS
OXYGEN SATURATION: 95 % | BODY MASS INDEX: 32.61 KG/M2 | HEART RATE: 76 BPM | OXYGEN SATURATION: 95 % | WEIGHT: 254 LBS | RESPIRATION RATE: 20 BRPM | DIASTOLIC BLOOD PRESSURE: 84 MMHG | HEART RATE: 65 BPM | WEIGHT: 257.5 LBS | BODY MASS INDEX: 33.05 KG/M2 | SYSTOLIC BLOOD PRESSURE: 145 MMHG | HEIGHT: 74 IN | TEMPERATURE: 98 F

## 2020-01-01 VITALS
OXYGEN SATURATION: 97 % | DIASTOLIC BLOOD PRESSURE: 70 MMHG | BODY MASS INDEX: 31.1 KG/M2 | SYSTOLIC BLOOD PRESSURE: 135 MMHG | WEIGHT: 242.31 LBS | HEART RATE: 60 BPM | HEIGHT: 74 IN | HEART RATE: 61 BPM | WEIGHT: 242 LBS | DIASTOLIC BLOOD PRESSURE: 76 MMHG | TEMPERATURE: 98 F | RESPIRATION RATE: 18 BRPM | BODY MASS INDEX: 31.07 KG/M2 | SYSTOLIC BLOOD PRESSURE: 102 MMHG

## 2020-01-01 VITALS
TEMPERATURE: 97 F | HEART RATE: 66 BPM | WEIGHT: 240.31 LBS | OXYGEN SATURATION: 95 % | HEIGHT: 74 IN | RESPIRATION RATE: 16 BRPM | BODY MASS INDEX: 30.93 KG/M2 | WEIGHT: 241 LBS | HEIGHT: 74 IN | BODY MASS INDEX: 30.84 KG/M2 | SYSTOLIC BLOOD PRESSURE: 123 MMHG | DIASTOLIC BLOOD PRESSURE: 77 MMHG

## 2020-01-01 VITALS
WEIGHT: 244.88 LBS | HEIGHT: 74 IN | SYSTOLIC BLOOD PRESSURE: 145 MMHG | OXYGEN SATURATION: 94 % | HEIGHT: 74 IN | BODY MASS INDEX: 31.43 KG/M2 | TEMPERATURE: 99 F | WEIGHT: 238 LBS | HEART RATE: 60 BPM | DIASTOLIC BLOOD PRESSURE: 76 MMHG | RESPIRATION RATE: 18 BRPM | BODY MASS INDEX: 30.54 KG/M2

## 2020-01-01 VITALS
HEART RATE: 67 BPM | WEIGHT: 241.69 LBS | SYSTOLIC BLOOD PRESSURE: 111 MMHG | OXYGEN SATURATION: 96 % | TEMPERATURE: 97 F | BODY MASS INDEX: 31.03 KG/M2 | DIASTOLIC BLOOD PRESSURE: 75 MMHG

## 2020-01-01 VITALS
WEIGHT: 241.75 LBS | HEIGHT: 72 IN | SYSTOLIC BLOOD PRESSURE: 114 MMHG | DIASTOLIC BLOOD PRESSURE: 74 MMHG | OXYGEN SATURATION: 96 % | RESPIRATION RATE: 18 BRPM | HEART RATE: 57 BPM | TEMPERATURE: 98 F | BODY MASS INDEX: 32.75 KG/M2

## 2020-01-01 VITALS
RESPIRATION RATE: 18 BRPM | BODY MASS INDEX: 31.16 KG/M2 | WEIGHT: 242.69 LBS | TEMPERATURE: 97 F | SYSTOLIC BLOOD PRESSURE: 129 MMHG | DIASTOLIC BLOOD PRESSURE: 86 MMHG | HEART RATE: 73 BPM

## 2020-01-01 VITALS
SYSTOLIC BLOOD PRESSURE: 137 MMHG | RESPIRATION RATE: 18 BRPM | OXYGEN SATURATION: 100 % | HEART RATE: 66 BPM | TEMPERATURE: 98 F | BODY MASS INDEX: 32.1 KG/M2 | DIASTOLIC BLOOD PRESSURE: 76 MMHG | WEIGHT: 250 LBS

## 2020-01-01 VITALS
SYSTOLIC BLOOD PRESSURE: 129 MMHG | OXYGEN SATURATION: 94 % | TEMPERATURE: 98 F | RESPIRATION RATE: 16 BRPM | DIASTOLIC BLOOD PRESSURE: 76 MMHG | WEIGHT: 244 LBS | SYSTOLIC BLOOD PRESSURE: 119 MMHG | TEMPERATURE: 97 F | RESPIRATION RATE: 19 BRPM | DIASTOLIC BLOOD PRESSURE: 74 MMHG | HEART RATE: 64 BPM | BODY MASS INDEX: 31.33 KG/M2 | HEART RATE: 60 BPM

## 2020-01-01 VITALS
SYSTOLIC BLOOD PRESSURE: 117 MMHG | DIASTOLIC BLOOD PRESSURE: 79 MMHG | OXYGEN SATURATION: 99 % | TEMPERATURE: 98 F | RESPIRATION RATE: 17 BRPM | HEIGHT: 74 IN | WEIGHT: 235.81 LBS | HEART RATE: 70 BPM | BODY MASS INDEX: 30.26 KG/M2

## 2020-01-01 VITALS
BODY MASS INDEX: 31.46 KG/M2 | RESPIRATION RATE: 19 BRPM | WEIGHT: 245 LBS | TEMPERATURE: 99 F | DIASTOLIC BLOOD PRESSURE: 63 MMHG | HEART RATE: 60 BPM | SYSTOLIC BLOOD PRESSURE: 102 MMHG

## 2020-01-01 DIAGNOSIS — M79.661 RIGHT CALF PAIN: ICD-10-CM

## 2020-01-01 DIAGNOSIS — C34.11 MALIGNANT NEOPLASM OF UPPER LOBE OF RIGHT LUNG: Primary | ICD-10-CM

## 2020-01-01 DIAGNOSIS — C34.11 MALIGNANT NEOPLASM OF UPPER LOBE OF RIGHT LUNG: ICD-10-CM

## 2020-01-01 DIAGNOSIS — C34.91 NON-SMALL CELL CANCER OF RIGHT LUNG: ICD-10-CM

## 2020-01-01 DIAGNOSIS — J90 PLEURAL EFFUSION: ICD-10-CM

## 2020-01-01 DIAGNOSIS — Z79.899 ENCOUNTER FOR LONG-TERM (CURRENT) USE OF OTHER MEDICATIONS: ICD-10-CM

## 2020-01-01 DIAGNOSIS — J91.0 MALIGNANT PLEURAL EFFUSION: ICD-10-CM

## 2020-01-01 DIAGNOSIS — R05.9 COUGH: ICD-10-CM

## 2020-01-01 DIAGNOSIS — J91.0 MALIGNANT PLEURAL EFFUSION: Primary | ICD-10-CM

## 2020-01-01 DIAGNOSIS — J90 PLEURAL EFFUSION: Primary | ICD-10-CM

## 2020-01-01 DIAGNOSIS — R07.9 CHEST PAIN: ICD-10-CM

## 2020-01-01 DIAGNOSIS — M79.601 PAIN AND SWELLING OF UPPER EXTREMITY, RIGHT: ICD-10-CM

## 2020-01-01 DIAGNOSIS — M79.89 PAIN AND SWELLING OF UPPER EXTREMITY, RIGHT: Primary | ICD-10-CM

## 2020-01-01 DIAGNOSIS — M79.89 PAIN AND SWELLING OF UPPER EXTREMITY, RIGHT: ICD-10-CM

## 2020-01-01 DIAGNOSIS — Z87.891 FORMER SMOKER: ICD-10-CM

## 2020-01-01 DIAGNOSIS — R60.9 SWELLING: ICD-10-CM

## 2020-01-01 DIAGNOSIS — I82.B11 SUBCLAVIAN VEIN THROMBOSIS, RIGHT: ICD-10-CM

## 2020-01-01 DIAGNOSIS — M79.601 PAIN AND SWELLING OF UPPER EXTREMITY, RIGHT: Primary | ICD-10-CM

## 2020-01-01 DIAGNOSIS — M10.9 GOUT, UNSPECIFIED CAUSE, UNSPECIFIED CHRONICITY, UNSPECIFIED SITE: ICD-10-CM

## 2020-01-01 DIAGNOSIS — R06.00 DYSPNEA, UNSPECIFIED TYPE: ICD-10-CM

## 2020-01-01 DIAGNOSIS — Z01.84 ENCOUNTER FOR IMMUNOLOGICAL TEST: ICD-10-CM

## 2020-01-01 DIAGNOSIS — R06.02 SOB (SHORTNESS OF BREATH): ICD-10-CM

## 2020-01-01 DIAGNOSIS — C80.1 ADENOCARCINOMA: ICD-10-CM

## 2020-01-01 DIAGNOSIS — D64.9 NORMOCYTIC ANEMIA: ICD-10-CM

## 2020-01-01 DIAGNOSIS — Z45.2 ENCOUNTER FOR INSERTION OF VENOUS ACCESS PORT: ICD-10-CM

## 2020-01-01 DIAGNOSIS — Z96.89 CHEST TUBE IN PLACE: ICD-10-CM

## 2020-01-01 DIAGNOSIS — R06.02 SOB (SHORTNESS OF BREATH): Primary | ICD-10-CM

## 2020-01-01 DIAGNOSIS — R06.02 SHORTNESS OF BREATH: ICD-10-CM

## 2020-01-01 DIAGNOSIS — D50.9 IRON DEFICIENCY ANEMIA, UNSPECIFIED IRON DEFICIENCY ANEMIA TYPE: ICD-10-CM

## 2020-01-01 DIAGNOSIS — C34.91 NON-SMALL CELL CANCER OF RIGHT LUNG: Primary | ICD-10-CM

## 2020-01-01 DIAGNOSIS — I87.2 VENOUS INSUFFICIENCY: ICD-10-CM

## 2020-01-01 DIAGNOSIS — R91.1 PULMONARY NODULE: ICD-10-CM

## 2020-01-01 DIAGNOSIS — D50.9 MICROCYTIC ANEMIA: ICD-10-CM

## 2020-01-01 DIAGNOSIS — M79.621 PAIN IN RIGHT UPPER ARM: ICD-10-CM

## 2020-01-01 DIAGNOSIS — Z98.890 STATUS POST THORACENTESIS: ICD-10-CM

## 2020-01-01 DIAGNOSIS — B34.9 VIRAL ILLNESS: Primary | ICD-10-CM

## 2020-01-01 DIAGNOSIS — J15.8 PNEUMONIA DUE TO AEROBIC BACTERIA: ICD-10-CM

## 2020-01-01 DIAGNOSIS — M10.9 GOUT, UNSPECIFIED CAUSE, UNSPECIFIED CHRONICITY, UNSPECIFIED SITE: Primary | ICD-10-CM

## 2020-01-01 DIAGNOSIS — J90 PLEURAL EFFUSION: Chronic | ICD-10-CM

## 2020-01-01 DIAGNOSIS — D64.9 ACUTE ON CHRONIC ANEMIA: Primary | ICD-10-CM

## 2020-01-01 DIAGNOSIS — I45.9 HEART BLOCK: ICD-10-CM

## 2020-01-01 DIAGNOSIS — R52 ACUTE PAIN: ICD-10-CM

## 2020-01-01 DIAGNOSIS — J18.9 PNEUMONIA DUE TO INFECTIOUS ORGANISM, UNSPECIFIED LATERALITY, UNSPECIFIED PART OF LUNG: ICD-10-CM

## 2020-01-01 LAB
ABO + RH BLD: NORMAL
ACID FAST MOD KINY STN SPEC: NORMAL
ALBUMIN SERPL BCP-MCNC: 2.7 G/DL (ref 3.5–5.2)
ALBUMIN SERPL BCP-MCNC: 2.8 G/DL (ref 3.5–5.2)
ALBUMIN SERPL BCP-MCNC: 3 G/DL (ref 3.5–5.2)
ALBUMIN SERPL BCP-MCNC: 3 G/DL (ref 3.5–5.2)
ALBUMIN SERPL BCP-MCNC: 3.1 G/DL (ref 3.5–5.2)
ALBUMIN SERPL BCP-MCNC: 3.2 G/DL (ref 3.5–5.2)
ALBUMIN SERPL BCP-MCNC: 3.3 G/DL (ref 3.5–5.2)
ALBUMIN SERPL BCP-MCNC: 3.3 G/DL (ref 3.5–5.2)
ALBUMIN SERPL BCP-MCNC: 3.8 G/DL (ref 3.5–5.2)
ALP SERPL-CCNC: 56 U/L (ref 55–135)
ALP SERPL-CCNC: 63 U/L (ref 55–135)
ALP SERPL-CCNC: 67 U/L (ref 55–135)
ALP SERPL-CCNC: 68 U/L (ref 55–135)
ALP SERPL-CCNC: 73 U/L (ref 55–135)
ALP SERPL-CCNC: 81 U/L (ref 55–135)
ALP SERPL-CCNC: 85 U/L (ref 55–135)
ALP SERPL-CCNC: 86 U/L (ref 55–135)
ALP SERPL-CCNC: 89 U/L (ref 55–135)
ALT SERPL W/O P-5'-P-CCNC: 10 U/L (ref 10–44)
ALT SERPL W/O P-5'-P-CCNC: 11 U/L (ref 10–44)
ALT SERPL W/O P-5'-P-CCNC: 12 U/L (ref 10–44)
ALT SERPL W/O P-5'-P-CCNC: 13 U/L (ref 10–44)
ALT SERPL W/O P-5'-P-CCNC: 16 U/L (ref 10–44)
ALT SERPL W/O P-5'-P-CCNC: 17 U/L (ref 10–44)
ALT SERPL W/O P-5'-P-CCNC: 23 U/L (ref 10–44)
ALT SERPL W/O P-5'-P-CCNC: 25 U/L (ref 10–44)
ALT SERPL W/O P-5'-P-CCNC: 25 U/L (ref 10–44)
ANION GAP SERPL CALC-SCNC: 10 MMOL/L (ref 8–16)
ANION GAP SERPL CALC-SCNC: 11 MMOL/L (ref 8–16)
ANION GAP SERPL CALC-SCNC: 12 MMOL/L (ref 8–16)
ANION GAP SERPL CALC-SCNC: 15 MMOL/L (ref 8–16)
ANION GAP SERPL CALC-SCNC: 7 MMOL/L (ref 8–16)
ANION GAP SERPL CALC-SCNC: 7 MMOL/L (ref 8–16)
ANION GAP SERPL CALC-SCNC: 8 MMOL/L (ref 8–16)
ANION GAP SERPL CALC-SCNC: 9 MMOL/L (ref 8–16)
APPEARANCE FLD: ABNORMAL
APTT PPP: 28.1 SEC (ref 23.6–33.3)
APTT PPP: 29.1 SEC (ref 23.6–33.3)
APTT PPP: 30 SEC (ref 23.6–33.3)
APTT PPP: 30.9 SEC (ref 23.6–33.3)
APTT PPP: 36.9 SEC (ref 23.6–33.3)
APTT PPP: 98.3 SEC (ref 23.6–33.3)
AST SERPL-CCNC: 14 U/L (ref 10–40)
AST SERPL-CCNC: 15 U/L (ref 10–40)
AST SERPL-CCNC: 16 U/L (ref 10–40)
AST SERPL-CCNC: 16 U/L (ref 10–40)
AST SERPL-CCNC: 17 U/L (ref 10–40)
AST SERPL-CCNC: 17 U/L (ref 10–40)
AST SERPL-CCNC: 21 U/L (ref 10–40)
AST SERPL-CCNC: 21 U/L (ref 10–40)
AST SERPL-CCNC: 26 U/L (ref 10–40)
BACTERIA BLD CULT: NORMAL
BACTERIA FLD AEROBE CULT: NO GROWTH
BACTERIA SPEC ANAEROBE CULT: NORMAL
BASOPHILS # BLD AUTO: 0 K/UL (ref 0–0.2)
BASOPHILS # BLD AUTO: 0 K/UL (ref 0–0.2)
BASOPHILS # BLD AUTO: 0.01 K/UL (ref 0–0.2)
BASOPHILS # BLD AUTO: 0.02 K/UL (ref 0–0.2)
BASOPHILS # BLD AUTO: 0.03 K/UL (ref 0–0.2)
BASOPHILS # BLD AUTO: 0.05 K/UL (ref 0–0.2)
BASOPHILS # BLD AUTO: 0.06 K/UL (ref 0–0.2)
BASOPHILS # BLD AUTO: 0.07 K/UL (ref 0–0.2)
BASOPHILS # BLD AUTO: 0.07 K/UL (ref 0–0.2)
BASOPHILS NFR BLD: 0 % (ref 0–1.9)
BASOPHILS NFR BLD: 0 % (ref 0–1.9)
BASOPHILS NFR BLD: 0.2 % (ref 0–1.9)
BASOPHILS NFR BLD: 0.3 % (ref 0–1.9)
BASOPHILS NFR BLD: 0.4 % (ref 0–1.9)
BASOPHILS NFR BLD: 0.6 % (ref 0–1.9)
BASOPHILS NFR BLD: 0.7 % (ref 0–1.9)
BASOPHILS NFR BLD: 0.8 % (ref 0–1.9)
BASOPHILS NFR BLD: 0.8 % (ref 0–1.9)
BASOPHILS NFR BLD: 0.9 % (ref 0–1.9)
BASOPHILS NFR BLD: 0.9 % (ref 0–1.9)
BILIRUB SERPL-MCNC: 0.2 MG/DL (ref 0.1–1)
BILIRUB SERPL-MCNC: 0.3 MG/DL (ref 0.1–1)
BILIRUB SERPL-MCNC: 0.4 MG/DL (ref 0.1–1)
BILIRUB SERPL-MCNC: 0.5 MG/DL (ref 0.1–1)
BILIRUB SERPL-MCNC: 0.5 MG/DL (ref 0.1–1)
BILIRUB SERPL-MCNC: 0.6 MG/DL (ref 0.1–1)
BILIRUB SERPL-MCNC: 0.8 MG/DL (ref 0.1–1)
BLD GP AB SCN CELLS X3 SERPL QL: NORMAL
BLD PROD TYP BPU: NORMAL
BLD PROD TYP BPU: NORMAL
BLOOD UNIT EXPIRATION DATE: NORMAL
BLOOD UNIT EXPIRATION DATE: NORMAL
BLOOD UNIT TYPE CODE: 7300
BLOOD UNIT TYPE CODE: 7300
BLOOD UNIT TYPE: NORMAL
BLOOD UNIT TYPE: NORMAL
BNP SERPL-MCNC: 100 PG/ML (ref 0–99)
BNP SERPL-MCNC: 67 PG/ML (ref 0–99)
BNP SERPL-MCNC: 80 PG/ML (ref 0–99)
BODY FLD TYPE: ABNORMAL
BODY FLUID COMMENTS: ABNORMAL
BUN SERPL-MCNC: 10 MG/DL (ref 8–23)
BUN SERPL-MCNC: 13 MG/DL (ref 8–23)
BUN SERPL-MCNC: 14 MG/DL (ref 8–23)
BUN SERPL-MCNC: 15 MG/DL (ref 8–23)
BUN SERPL-MCNC: 15 MG/DL (ref 8–23)
BUN SERPL-MCNC: 16 MG/DL (ref 8–23)
BUN SERPL-MCNC: 18 MG/DL (ref 8–23)
CALCIUM SERPL-MCNC: 8.2 MG/DL (ref 8.7–10.5)
CALCIUM SERPL-MCNC: 8.6 MG/DL (ref 8.7–10.5)
CALCIUM SERPL-MCNC: 8.7 MG/DL (ref 8.7–10.5)
CALCIUM SERPL-MCNC: 8.9 MG/DL (ref 8.7–10.5)
CALCIUM SERPL-MCNC: 9.1 MG/DL (ref 8.7–10.5)
CALCIUM SERPL-MCNC: 9.1 MG/DL (ref 8.7–10.5)
CALCIUM SERPL-MCNC: 9.2 MG/DL (ref 8.7–10.5)
CALCIUM SERPL-MCNC: 9.2 MG/DL (ref 8.7–10.5)
CALCIUM SERPL-MCNC: 9.3 MG/DL (ref 8.7–10.5)
CHLORIDE SERPL-SCNC: 102 MMOL/L (ref 95–110)
CHLORIDE SERPL-SCNC: 102 MMOL/L (ref 95–110)
CHLORIDE SERPL-SCNC: 103 MMOL/L (ref 95–110)
CHLORIDE SERPL-SCNC: 104 MMOL/L (ref 95–110)
CHLORIDE SERPL-SCNC: 105 MMOL/L (ref 95–110)
CHLORIDE SERPL-SCNC: 94 MMOL/L (ref 95–110)
CHLORIDE SERPL-SCNC: 96 MMOL/L (ref 95–110)
CHLORIDE SERPL-SCNC: 99 MMOL/L (ref 95–110)
CHLORIDE SERPL-SCNC: 99 MMOL/L (ref 95–110)
CO2 SERPL-SCNC: 26 MMOL/L (ref 23–29)
CO2 SERPL-SCNC: 26 MMOL/L (ref 23–29)
CO2 SERPL-SCNC: 27 MMOL/L (ref 23–29)
CO2 SERPL-SCNC: 28 MMOL/L (ref 23–29)
CO2 SERPL-SCNC: 28 MMOL/L (ref 23–29)
CO2 SERPL-SCNC: 29 MMOL/L (ref 23–29)
CODING SYSTEM: NORMAL
CODING SYSTEM: NORMAL
COLOR FLD: YELLOW
CREAT SERPL-MCNC: 0.8 MG/DL (ref 0.5–1.4)
CREAT SERPL-MCNC: 0.9 MG/DL (ref 0.5–1.4)
CREAT SERPL-MCNC: 0.9 MG/DL (ref 0.5–1.4)
CREAT SERPL-MCNC: 1 MG/DL (ref 0.5–1.4)
CREAT SERPL-MCNC: 1.1 MG/DL (ref 0.5–1.4)
CREAT SERPL-MCNC: 1.1 MG/DL (ref 0.5–1.4)
CREAT SERPL-MCNC: 1.3 MG/DL (ref 0.5–1.4)
DIFFERENTIAL METHOD: ABNORMAL
DISPENSE STATUS: NORMAL
DISPENSE STATUS: NORMAL
EOSINOPHIL # BLD AUTO: 0 K/UL (ref 0–0.5)
EOSINOPHIL # BLD AUTO: 0.1 K/UL (ref 0–0.5)
EOSINOPHIL # BLD AUTO: 0.1 K/UL (ref 0–0.5)
EOSINOPHIL # BLD AUTO: 0.2 K/UL (ref 0–0.5)
EOSINOPHIL # BLD AUTO: 0.2 K/UL (ref 0–0.5)
EOSINOPHIL # BLD AUTO: 0.3 K/UL (ref 0–0.5)
EOSINOPHIL NFR BLD: 0.3 % (ref 0–8)
EOSINOPHIL NFR BLD: 0.9 % (ref 0–8)
EOSINOPHIL NFR BLD: 1.7 % (ref 0–8)
EOSINOPHIL NFR BLD: 1.9 % (ref 0–8)
EOSINOPHIL NFR BLD: 2.8 % (ref 0–8)
EOSINOPHIL NFR BLD: 2.8 % (ref 0–8)
EOSINOPHIL NFR BLD: 3.1 % (ref 0–8)
EOSINOPHIL NFR BLD: 3.5 % (ref 0–8)
EOSINOPHIL NFR BLD: 3.7 % (ref 0–8)
EOSINOPHIL NFR BLD: 6.4 % (ref 0–8)
EOSINOPHIL NFR BLD: 8.6 % (ref 0–8)
ERYTHROCYTE [DISTWIDTH] IN BLOOD BY AUTOMATED COUNT: 12.2 % (ref 11.5–14.5)
ERYTHROCYTE [DISTWIDTH] IN BLOOD BY AUTOMATED COUNT: 12.5 % (ref 11.5–14.5)
ERYTHROCYTE [DISTWIDTH] IN BLOOD BY AUTOMATED COUNT: 12.6 % (ref 11.5–14.5)
ERYTHROCYTE [DISTWIDTH] IN BLOOD BY AUTOMATED COUNT: 12.7 % (ref 11.5–14.5)
ERYTHROCYTE [DISTWIDTH] IN BLOOD BY AUTOMATED COUNT: 13.6 % (ref 11.5–14.5)
ERYTHROCYTE [DISTWIDTH] IN BLOOD BY AUTOMATED COUNT: 16.2 % (ref 11.5–14.5)
ERYTHROCYTE [DISTWIDTH] IN BLOOD BY AUTOMATED COUNT: 18.4 % (ref 11.5–14.5)
ERYTHROCYTE [DISTWIDTH] IN BLOOD BY AUTOMATED COUNT: 19.4 % (ref 11.5–14.5)
ERYTHROCYTE [DISTWIDTH] IN BLOOD BY AUTOMATED COUNT: 19.5 % (ref 11.5–14.5)
ERYTHROCYTE [DISTWIDTH] IN BLOOD BY AUTOMATED COUNT: 21.1 % (ref 11.5–14.5)
ERYTHROCYTE [DISTWIDTH] IN BLOOD BY AUTOMATED COUNT: 21.3 % (ref 11.5–14.5)
EST. GFR  (AFRICAN AMERICAN): >60 ML/MIN/1.73 M^2
EST. GFR  (NON AFRICAN AMERICAN): 53.8 ML/MIN/1.73 M^2
EST. GFR  (NON AFRICAN AMERICAN): >60 ML/MIN/1.73 M^2
FERRITIN SERPL-MCNC: 987 NG/ML (ref 20–300)
GLUCOSE SERPL-MCNC: 103 MG/DL (ref 70–110)
GLUCOSE SERPL-MCNC: 104 MG/DL (ref 70–110)
GLUCOSE SERPL-MCNC: 104 MG/DL (ref 70–110)
GLUCOSE SERPL-MCNC: 107 MG/DL (ref 70–110)
GLUCOSE SERPL-MCNC: 107 MG/DL (ref 70–110)
GLUCOSE SERPL-MCNC: 110 MG/DL (ref 70–110)
GLUCOSE SERPL-MCNC: 115 MG/DL (ref 70–110)
GLUCOSE SERPL-MCNC: 119 MG/DL (ref 70–110)
GLUCOSE SERPL-MCNC: 122 MG/DL (ref 70–110)
GLUCOSE SERPL-MCNC: 129 MG/DL (ref 70–110)
GLUCOSE SERPL-MCNC: 90 MG/DL (ref 70–110)
GLUCOSE SERPL-MCNC: 98 MG/DL (ref 70–110)
GLUCOSE SERPL-MCNC: 98 MG/DL (ref 70–110)
GRAM STN SPEC: NORMAL
GRAM STN SPEC: NORMAL
HCT VFR BLD AUTO: 23.5 % (ref 40–54)
HCT VFR BLD AUTO: 25.6 % (ref 40–54)
HCT VFR BLD AUTO: 25.9 % (ref 40–54)
HCT VFR BLD AUTO: 27.9 % (ref 40–54)
HCT VFR BLD AUTO: 28.1 % (ref 40–54)
HCT VFR BLD AUTO: 28.4 % (ref 40–54)
HCT VFR BLD AUTO: 31 % (ref 40–54)
HCT VFR BLD AUTO: 36.7 % (ref 40–54)
HCT VFR BLD AUTO: 37 % (ref 40–54)
HCT VFR BLD AUTO: 39.8 % (ref 40–54)
HCT VFR BLD AUTO: 40.4 % (ref 40–54)
HGB BLD-MCNC: 10.1 G/DL (ref 14–18)
HGB BLD-MCNC: 12 G/DL (ref 14–18)
HGB BLD-MCNC: 12.4 G/DL (ref 14–18)
HGB BLD-MCNC: 13.1 G/DL (ref 14–18)
HGB BLD-MCNC: 13.2 G/DL (ref 14–18)
HGB BLD-MCNC: 7.8 G/DL (ref 14–18)
HGB BLD-MCNC: 8.1 G/DL (ref 14–18)
HGB BLD-MCNC: 8.4 G/DL (ref 14–18)
HGB BLD-MCNC: 8.9 G/DL (ref 14–18)
HGB BLD-MCNC: 8.9 G/DL (ref 14–18)
HGB BLD-MCNC: 9.1 G/DL (ref 14–18)
IMM GRANULOCYTES # BLD AUTO: 0.01 K/UL (ref 0–0.04)
IMM GRANULOCYTES # BLD AUTO: 0.02 K/UL (ref 0–0.04)
IMM GRANULOCYTES # BLD AUTO: 0.03 K/UL (ref 0–0.04)
IMM GRANULOCYTES # BLD AUTO: 0.03 K/UL (ref 0–0.04)
IMM GRANULOCYTES # BLD AUTO: 0.04 K/UL (ref 0–0.04)
IMM GRANULOCYTES NFR BLD AUTO: 0.2 % (ref 0–0.5)
IMM GRANULOCYTES NFR BLD AUTO: 0.2 % (ref 0–0.5)
IMM GRANULOCYTES NFR BLD AUTO: 0.4 % (ref 0–0.5)
IMM GRANULOCYTES NFR BLD AUTO: 0.5 % (ref 0–0.5)
IMM GRANULOCYTES NFR BLD AUTO: 0.6 % (ref 0–0.5)
IMM GRANULOCYTES NFR BLD AUTO: 0.9 % (ref 0–0.5)
INR PPP: 1.1
INR PPP: 1.2
INR PPP: 1.3
INR PPP: 1.4
IRON SERPL-MCNC: 19 UG/DL (ref 45–160)
LACTATE SERPL-SCNC: 0.8 MMOL/L (ref 0.5–1.9)
LACTATE SERPL-SCNC: 1 MMOL/L (ref 0.5–1.9)
LYMPHOCYTES # BLD AUTO: 0.4 K/UL (ref 1–4.8)
LYMPHOCYTES # BLD AUTO: 0.4 K/UL (ref 1–4.8)
LYMPHOCYTES # BLD AUTO: 0.5 K/UL (ref 1–4.8)
LYMPHOCYTES # BLD AUTO: 0.7 K/UL (ref 1–4.8)
LYMPHOCYTES # BLD AUTO: 0.8 K/UL (ref 1–4.8)
LYMPHOCYTES # BLD AUTO: 0.9 K/UL (ref 1–4.8)
LYMPHOCYTES # BLD AUTO: 1 K/UL (ref 1–4.8)
LYMPHOCYTES # BLD AUTO: 1 K/UL (ref 1–4.8)
LYMPHOCYTES # BLD AUTO: 1.1 K/UL (ref 1–4.8)
LYMPHOCYTES # BLD AUTO: 1.5 K/UL (ref 1–4.8)
LYMPHOCYTES # BLD AUTO: 1.5 K/UL (ref 1–4.8)
LYMPHOCYTES NFR BLD: 11.8 % (ref 18–48)
LYMPHOCYTES NFR BLD: 12.6 % (ref 18–48)
LYMPHOCYTES NFR BLD: 14 % (ref 18–48)
LYMPHOCYTES NFR BLD: 14.9 % (ref 18–48)
LYMPHOCYTES NFR BLD: 18.1 % (ref 18–48)
LYMPHOCYTES NFR BLD: 18.3 % (ref 18–48)
LYMPHOCYTES NFR BLD: 24 % (ref 18–48)
LYMPHOCYTES NFR BLD: 29.2 % (ref 18–48)
LYMPHOCYTES NFR BLD: 33.8 % (ref 18–48)
LYMPHOCYTES NFR BLD: 34.8 % (ref 18–48)
LYMPHOCYTES NFR BLD: 6.4 % (ref 18–48)
LYMPHOCYTES NFR FLD MANUAL: 19 %
MAGNESIUM SERPL-MCNC: 1.8 MG/DL (ref 1.6–2.6)
MAGNESIUM SERPL-MCNC: 1.9 MG/DL (ref 1.6–2.6)
MCH RBC QN AUTO: 26.8 PG (ref 27–31)
MCH RBC QN AUTO: 27.9 PG (ref 27–31)
MCH RBC QN AUTO: 27.9 PG (ref 27–31)
MCH RBC QN AUTO: 28.7 PG (ref 27–31)
MCH RBC QN AUTO: 29 PG (ref 27–31)
MCH RBC QN AUTO: 29.2 PG (ref 27–31)
MCH RBC QN AUTO: 29.6 PG (ref 27–31)
MCH RBC QN AUTO: 29.9 PG (ref 27–31)
MCH RBC QN AUTO: 30 PG (ref 27–31)
MCH RBC QN AUTO: 30.1 PG (ref 27–31)
MCH RBC QN AUTO: 30.2 PG (ref 27–31)
MCHC RBC AUTO-ENTMCNC: 31.6 G/DL (ref 32–36)
MCHC RBC AUTO-ENTMCNC: 31.7 G/DL (ref 32–36)
MCHC RBC AUTO-ENTMCNC: 31.9 G/DL (ref 32–36)
MCHC RBC AUTO-ENTMCNC: 32 G/DL (ref 32–36)
MCHC RBC AUTO-ENTMCNC: 32.4 G/DL (ref 32–36)
MCHC RBC AUTO-ENTMCNC: 32.4 G/DL (ref 32–36)
MCHC RBC AUTO-ENTMCNC: 32.6 G/DL (ref 32–36)
MCHC RBC AUTO-ENTMCNC: 32.7 G/DL (ref 32–36)
MCHC RBC AUTO-ENTMCNC: 32.9 G/DL (ref 32–36)
MCHC RBC AUTO-ENTMCNC: 33.2 G/DL (ref 32–36)
MCHC RBC AUTO-ENTMCNC: 33.8 G/DL (ref 32–36)
MCV RBC AUTO: 84 FL (ref 82–98)
MCV RBC AUTO: 88 FL (ref 82–98)
MCV RBC AUTO: 89 FL (ref 82–98)
MCV RBC AUTO: 91 FL (ref 82–98)
MCV RBC AUTO: 92 FL (ref 82–98)
MCV RBC AUTO: 93 FL (ref 82–98)
MESOTHL CELL NFR FLD MANUAL: 5 %
MONOCYTES # BLD AUTO: 0.3 K/UL (ref 0.3–1)
MONOCYTES # BLD AUTO: 0.3 K/UL (ref 0.3–1)
MONOCYTES # BLD AUTO: 0.4 K/UL (ref 0.3–1)
MONOCYTES # BLD AUTO: 0.5 K/UL (ref 0.3–1)
MONOCYTES # BLD AUTO: 0.7 K/UL (ref 0.3–1)
MONOCYTES # BLD AUTO: 0.8 K/UL (ref 0.3–1)
MONOCYTES # BLD AUTO: 0.9 K/UL (ref 0.3–1)
MONOCYTES NFR BLD: 10.5 % (ref 4–15)
MONOCYTES NFR BLD: 10.8 % (ref 4–15)
MONOCYTES NFR BLD: 11.1 % (ref 4–15)
MONOCYTES NFR BLD: 15.3 % (ref 4–15)
MONOCYTES NFR BLD: 16 % (ref 4–15)
MONOCYTES NFR BLD: 18.5 % (ref 4–15)
MONOCYTES NFR BLD: 22.5 % (ref 4–15)
MONOCYTES NFR BLD: 7.8 % (ref 4–15)
MONOCYTES NFR BLD: 8.4 % (ref 4–15)
MONOCYTES NFR BLD: 9.1 % (ref 4–15)
MONOCYTES NFR BLD: 9.1 % (ref 4–15)
MONOS+MACROS NFR FLD MANUAL: 3 %
MYCOBACTERIUM SPEC QL CULT: NORMAL
NEUTROPHILS # BLD AUTO: 1 K/UL (ref 1.8–7.7)
NEUTROPHILS # BLD AUTO: 1 K/UL (ref 1.8–7.7)
NEUTROPHILS # BLD AUTO: 1.4 K/UL (ref 1.8–7.7)
NEUTROPHILS # BLD AUTO: 1.7 K/UL (ref 1.8–7.7)
NEUTROPHILS # BLD AUTO: 2 K/UL (ref 1.8–7.7)
NEUTROPHILS # BLD AUTO: 2.5 K/UL (ref 1.8–7.7)
NEUTROPHILS # BLD AUTO: 4.8 K/UL (ref 1.8–7.7)
NEUTROPHILS # BLD AUTO: 5.4 K/UL (ref 1.8–7.7)
NEUTROPHILS # BLD AUTO: 5.5 K/UL (ref 1.8–7.7)
NEUTROPHILS # BLD AUTO: 6.1 K/UL (ref 1.8–7.7)
NEUTROPHILS # BLD AUTO: 6.6 K/UL (ref 1.8–7.7)
NEUTROPHILS NFR BLD: 40.2 % (ref 38–73)
NEUTROPHILS NFR BLD: 44.4 % (ref 38–73)
NEUTROPHILS NFR BLD: 48.3 % (ref 38–73)
NEUTROPHILS NFR BLD: 49.4 % (ref 38–73)
NEUTROPHILS NFR BLD: 66.5 % (ref 38–73)
NEUTROPHILS NFR BLD: 68.4 % (ref 38–73)
NEUTROPHILS NFR BLD: 72.2 % (ref 38–73)
NEUTROPHILS NFR BLD: 73 % (ref 38–73)
NEUTROPHILS NFR BLD: 74.4 % (ref 38–73)
NEUTROPHILS NFR BLD: 77 % (ref 38–73)
NEUTROPHILS NFR BLD: 82.8 % (ref 38–73)
NEUTROPHILS NFR FLD MANUAL: 65 %
NRBC BLD-RTO: 0 /100 WBC
NUM UNITS TRANS PACKED RBC: NORMAL
NUM UNITS TRANS PACKED RBC: NORMAL
OTHER CELLS FLD MANUAL: 8 %
PHOSPHATE SERPL-MCNC: 3.5 MG/DL (ref 2.7–4.5)
PLATELET # BLD AUTO: 126 K/UL (ref 150–350)
PLATELET # BLD AUTO: 173 K/UL (ref 150–350)
PLATELET # BLD AUTO: 216 K/UL (ref 150–350)
PLATELET # BLD AUTO: 300 K/UL (ref 150–350)
PLATELET # BLD AUTO: 305 K/UL (ref 150–350)
PLATELET # BLD AUTO: 330 K/UL (ref 150–350)
PLATELET # BLD AUTO: 350 K/UL (ref 150–350)
PLATELET # BLD AUTO: 361 K/UL (ref 150–350)
PLATELET # BLD AUTO: 432 K/UL (ref 150–350)
PLATELET # BLD AUTO: 92 K/UL (ref 150–350)
PLATELET # BLD AUTO: 97 K/UL (ref 150–350)
PLATELET BLD QL SMEAR: ABNORMAL
PLATELET BLD QL SMEAR: ABNORMAL
PMV BLD AUTO: 10 FL (ref 9.2–12.9)
PMV BLD AUTO: 10.3 FL (ref 9.2–12.9)
PMV BLD AUTO: 8.1 FL (ref 9.2–12.9)
PMV BLD AUTO: 8.2 FL (ref 9.2–12.9)
PMV BLD AUTO: 8.3 FL (ref 9.2–12.9)
PMV BLD AUTO: 8.3 FL (ref 9.2–12.9)
PMV BLD AUTO: 8.6 FL (ref 9.2–12.9)
PMV BLD AUTO: 8.7 FL (ref 9.2–12.9)
PMV BLD AUTO: 8.9 FL (ref 9.2–12.9)
PMV BLD AUTO: 9.3 FL (ref 9.2–12.9)
PMV BLD AUTO: 9.6 FL (ref 9.2–12.9)
POTASSIUM SERPL-SCNC: 3.3 MMOL/L (ref 3.5–5.1)
POTASSIUM SERPL-SCNC: 3.7 MMOL/L (ref 3.5–5.1)
POTASSIUM SERPL-SCNC: 3.8 MMOL/L (ref 3.5–5.1)
POTASSIUM SERPL-SCNC: 3.9 MMOL/L (ref 3.5–5.1)
POTASSIUM SERPL-SCNC: 4 MMOL/L (ref 3.5–5.1)
POTASSIUM SERPL-SCNC: 4 MMOL/L (ref 3.5–5.1)
POTASSIUM SERPL-SCNC: 4.1 MMOL/L (ref 3.5–5.1)
POTASSIUM SERPL-SCNC: 4.2 MMOL/L (ref 3.5–5.1)
POTASSIUM SERPL-SCNC: 4.3 MMOL/L (ref 3.5–5.1)
PROCALCITONIN SERPL IA-MCNC: <0.05 NG/ML (ref 0–0.5)
PROCALCITONIN SERPL IA-MCNC: <0.05 NG/ML (ref 0–0.5)
PROT SERPL-MCNC: 6.3 G/DL (ref 6–8.4)
PROT SERPL-MCNC: 6.5 G/DL (ref 6–8.4)
PROT SERPL-MCNC: 6.7 G/DL (ref 6–8.4)
PROT SERPL-MCNC: 7 G/DL (ref 6–8.4)
PROT SERPL-MCNC: 7.1 G/DL (ref 6–8.4)
PROT SERPL-MCNC: 7.2 G/DL (ref 6–8.4)
PROTHROMBIN TIME: 13.8 SEC (ref 10.6–14.8)
PROTHROMBIN TIME: 14.4 SEC (ref 10.6–14.8)
PROTHROMBIN TIME: 14.5 SEC (ref 10.6–14.8)
PROTHROMBIN TIME: 14.9 SEC (ref 10.6–14.8)
PROTHROMBIN TIME: 15.3 SEC (ref 10.6–14.8)
PROTHROMBIN TIME: 16.7 SEC (ref 10.6–14.8)
RBC # BLD AUTO: 2.67 M/UL (ref 4.6–6.2)
RBC # BLD AUTO: 2.9 M/UL (ref 4.6–6.2)
RBC # BLD AUTO: 2.93 M/UL (ref 4.6–6.2)
RBC # BLD AUTO: 3.07 M/UL (ref 4.6–6.2)
RBC # BLD AUTO: 3.19 M/UL (ref 4.6–6.2)
RBC # BLD AUTO: 3.39 M/UL (ref 4.6–6.2)
RBC # BLD AUTO: 3.41 M/UL (ref 4.6–6.2)
RBC # BLD AUTO: 3.97 M/UL (ref 4.6–6.2)
RBC # BLD AUTO: 4.13 M/UL (ref 4.6–6.2)
RBC # BLD AUTO: 4.38 M/UL (ref 4.6–6.2)
RBC # BLD AUTO: 4.39 M/UL (ref 4.6–6.2)
SARS-COV-2 RDRP RESP QL NAA+PROBE: NEGATIVE
SARS-COV-2 RNA RESP QL NAA+PROBE: NOT DETECTED
SATURATED IRON: 8 % (ref 20–50)
SODIUM SERPL-SCNC: 132 MMOL/L (ref 136–145)
SODIUM SERPL-SCNC: 136 MMOL/L (ref 136–145)
SODIUM SERPL-SCNC: 137 MMOL/L (ref 136–145)
SODIUM SERPL-SCNC: 138 MMOL/L (ref 136–145)
SODIUM SERPL-SCNC: 139 MMOL/L (ref 136–145)
SODIUM SERPL-SCNC: 140 MMOL/L (ref 136–145)
TOTAL IRON BINDING CAPACITY: 242 UG/DL (ref 250–450)
TRANSFERRIN SERPL-MCNC: 173 MG/DL (ref 200–375)
TROPONIN I SERPL DL<=0.01 NG/ML-MCNC: <0.03 NG/ML
WBC # BLD AUTO: 2.16 K/UL (ref 3.9–12.7)
WBC # BLD AUTO: 2.35 K/UL (ref 3.9–12.7)
WBC # BLD AUTO: 2.44 K/UL (ref 3.9–12.7)
WBC # BLD AUTO: 2.81 K/UL (ref 3.9–12.7)
WBC # BLD AUTO: 3.22 K/UL (ref 3.9–12.7)
WBC # BLD AUTO: 3.96 K/UL (ref 3.9–12.7)
WBC # BLD AUTO: 5.74 K/UL (ref 3.9–12.7)
WBC # BLD AUTO: 8.04 K/UL (ref 3.9–12.7)
WBC # BLD AUTO: 8.18 K/UL (ref 3.9–12.7)
WBC # BLD AUTO: 8.39 K/UL (ref 3.9–12.7)
WBC # BLD AUTO: 8.82 K/UL (ref 3.9–12.7)
WBC # FLD: 2163 /CU MM

## 2020-01-01 PROCEDURE — 25000003 PHARM REV CODE 250: Performed by: NURSE PRACTITIONER

## 2020-01-01 PROCEDURE — 96375 TX/PRO/DX INJ NEW DRUG ADDON: CPT

## 2020-01-01 PROCEDURE — 96413 CHEMO IV INFUSION 1 HR: CPT

## 2020-01-01 PROCEDURE — 93005 ELECTROCARDIOGRAM TRACING: CPT | Performed by: INTERNAL MEDICINE

## 2020-01-01 PROCEDURE — 96374 THER/PROPH/DIAG INJ IV PUSH: CPT

## 2020-01-01 PROCEDURE — 85025 COMPLETE CBC W/AUTO DIFF WBC: CPT

## 2020-01-01 PROCEDURE — 99204 PR OFFICE/OUTPT VISIT, NEW, LEVL IV, 45-59 MIN: ICD-10-PCS | Mod: ,,, | Performed by: INTERNAL MEDICINE

## 2020-01-01 PROCEDURE — 96367 TX/PROPH/DG ADDL SEQ IV INF: CPT

## 2020-01-01 PROCEDURE — 71046 X-RAY EXAM CHEST 2 VIEWS: CPT | Mod: TC

## 2020-01-01 PROCEDURE — 99214 PR OFFICE/OUTPT VISIT, EST, LEVL IV, 30-39 MIN: ICD-10-PCS | Mod: S$GLB,,, | Performed by: INTERNAL MEDICINE

## 2020-01-01 PROCEDURE — 99214 OFFICE O/P EST MOD 30 MIN: CPT | Mod: S$GLB,,, | Performed by: INTERNAL MEDICINE

## 2020-01-01 PROCEDURE — 99213 OFFICE O/P EST LOW 20 MIN: CPT | Mod: S$GLB,,, | Performed by: INTERNAL MEDICINE

## 2020-01-01 PROCEDURE — 94760 N-INVAS EAR/PLS OXIMETRY 1: CPT

## 2020-01-01 PROCEDURE — A4216 STERILE WATER/SALINE, 10 ML: HCPCS | Performed by: INTERNAL MEDICINE

## 2020-01-01 PROCEDURE — U0002 COVID-19 LAB TEST NON-CDC: HCPCS

## 2020-01-01 PROCEDURE — 99213 OFFICE O/P EST LOW 20 MIN: CPT | Mod: S$GLB,,, | Performed by: ORTHOPAEDIC SURGERY

## 2020-01-01 PROCEDURE — C1788 PORT, INDWELLING, IMP: HCPCS | Performed by: SURGERY

## 2020-01-01 PROCEDURE — 36000707: Performed by: SURGERY

## 2020-01-01 PROCEDURE — 84484 ASSAY OF TROPONIN QUANT: CPT

## 2020-01-01 PROCEDURE — 27200043 HC FORCEPS, BIOPSY: Performed by: INTERNAL MEDICINE

## 2020-01-01 PROCEDURE — 80053 COMPREHEN METABOLIC PANEL: CPT

## 2020-01-01 PROCEDURE — 94761 N-INVAS EAR/PLS OXIMETRY MLT: CPT | Mod: XB

## 2020-01-01 PROCEDURE — 99203 PR OFFICE/OUTPT VISIT, NEW, LEVL III, 30-44 MIN: ICD-10-PCS | Mod: S$GLB,,, | Performed by: SURGERY

## 2020-01-01 PROCEDURE — 87205 SMEAR GRAM STAIN: CPT

## 2020-01-01 PROCEDURE — 99215 PR OFFICE/OUTPT VISIT, EST, LEVL V, 40-54 MIN: ICD-10-PCS | Mod: S$GLB,,, | Performed by: NURSE PRACTITIONER

## 2020-01-01 PROCEDURE — 99900035 HC TECH TIME PER 15 MIN (STAT)

## 2020-01-01 PROCEDURE — 99204 OFFICE O/P NEW MOD 45 MIN: CPT | Mod: ,,, | Performed by: INTERNAL MEDICINE

## 2020-01-01 PROCEDURE — 27000284 HC CANNULA NASAL: Performed by: NURSE ANESTHETIST, CERTIFIED REGISTERED

## 2020-01-01 PROCEDURE — 71000016 HC POSTOP RECOV ADDL HR: Performed by: INTERNAL MEDICINE

## 2020-01-01 PROCEDURE — 36415 COLL VENOUS BLD VENIPUNCTURE: CPT

## 2020-01-01 PROCEDURE — 37000008 HC ANESTHESIA 1ST 15 MINUTES: Performed by: INTERNAL MEDICINE

## 2020-01-01 PROCEDURE — 78815 PET IMAGE W/CT SKULL-THIGH: CPT | Mod: TC,PO,PI

## 2020-01-01 PROCEDURE — 36000706: Performed by: SURGERY

## 2020-01-01 PROCEDURE — 96417 CHEMO IV INFUS EACH ADDL SEQ: CPT

## 2020-01-01 PROCEDURE — 27000654 HC CATH IV JELCO: Performed by: NURSE ANESTHETIST, CERTIFIED REGISTERED

## 2020-01-01 PROCEDURE — 99214 OFFICE O/P EST MOD 30 MIN: CPT | Mod: 25,S$GLB,, | Performed by: INTERNAL MEDICINE

## 2020-01-01 PROCEDURE — 84145 PROCALCITONIN (PCT): CPT

## 2020-01-01 PROCEDURE — 85610 PROTHROMBIN TIME: CPT

## 2020-01-01 PROCEDURE — P9016 RBC LEUKOCYTES REDUCED: HCPCS

## 2020-01-01 PROCEDURE — 25500020 PHARM REV CODE 255: Performed by: INTERNAL MEDICINE

## 2020-01-01 PROCEDURE — 99214 PR OFFICE/OUTPT VISIT, EST, LEVL IV, 30-39 MIN: ICD-10-PCS | Mod: S$GLB,,, | Performed by: NURSE PRACTITIONER

## 2020-01-01 PROCEDURE — 80048 BASIC METABOLIC PNL TOTAL CA: CPT

## 2020-01-01 PROCEDURE — 99999 HC NO LEVEL OF SERVICE - ED ONLY: CPT

## 2020-01-01 PROCEDURE — 36430 TRANSFUSION BLD/BLD COMPNT: CPT

## 2020-01-01 PROCEDURE — 63600175 PHARM REV CODE 636 W HCPCS: Mod: JG | Performed by: NURSE PRACTITIONER

## 2020-01-01 PROCEDURE — 96372 PR INJECTION,THERAP/PROPH/DIAG2ST, IM OR SUBCUT: ICD-10-PCS | Mod: S$GLB,,, | Performed by: INTERNAL MEDICINE

## 2020-01-01 PROCEDURE — 84100 ASSAY OF PHOSPHORUS: CPT

## 2020-01-01 PROCEDURE — 37000009 HC ANESTHESIA EA ADD 15 MINS: Performed by: INTERNAL MEDICINE

## 2020-01-01 PROCEDURE — 37000009 HC ANESTHESIA EA ADD 15 MINS: Performed by: SURGERY

## 2020-01-01 PROCEDURE — 87206 SMEAR FLUORESCENT/ACID STAI: CPT

## 2020-01-01 PROCEDURE — 96411 CHEMO IV PUSH ADDL DRUG: CPT

## 2020-01-01 PROCEDURE — G0378 HOSPITAL OBSERVATION PER HR: HCPCS

## 2020-01-01 PROCEDURE — 63600175 PHARM REV CODE 636 W HCPCS: Performed by: SURGERY

## 2020-01-01 PROCEDURE — 99205 PR OFFICE/OUTPT VISIT, NEW, LEVL V, 60-74 MIN: ICD-10-PCS | Mod: 25,S$GLB,, | Performed by: INTERNAL MEDICINE

## 2020-01-01 PROCEDURE — 63600175 PHARM REV CODE 636 W HCPCS: Performed by: NURSE PRACTITIONER

## 2020-01-01 PROCEDURE — 83880 ASSAY OF NATRIURETIC PEPTIDE: CPT

## 2020-01-01 PROCEDURE — 99201 PR OFFICE/OUTPT VISIT,NEW,LEVL I: CPT | Mod: S$GLB,,, | Performed by: PHYSICAL MEDICINE & REHABILITATION

## 2020-01-01 PROCEDURE — 96372 THER/PROPH/DIAG INJ SC/IM: CPT | Mod: S$GLB,,, | Performed by: INTERNAL MEDICINE

## 2020-01-01 PROCEDURE — 83605 ASSAY OF LACTIC ACID: CPT

## 2020-01-01 PROCEDURE — 84484 ASSAY OF TROPONIN QUANT: CPT | Mod: 91

## 2020-01-01 PROCEDURE — 63600175 PHARM REV CODE 636 W HCPCS: Performed by: EMERGENCY MEDICINE

## 2020-01-01 PROCEDURE — 71000015 HC POSTOP RECOV 1ST HR

## 2020-01-01 PROCEDURE — 87075 CULTR BACTERIA EXCEPT BLOOD: CPT

## 2020-01-01 PROCEDURE — 93971 EXTREMITY STUDY: CPT | Mod: TC,RT

## 2020-01-01 PROCEDURE — 85730 THROMBOPLASTIN TIME PARTIAL: CPT

## 2020-01-01 PROCEDURE — 63600175 PHARM REV CODE 636 W HCPCS: Performed by: NURSE ANESTHETIST, CERTIFIED REGISTERED

## 2020-01-01 PROCEDURE — 96361 HYDRATE IV INFUSION ADD-ON: CPT | Mod: GZ

## 2020-01-01 PROCEDURE — 86901 BLOOD TYPING SEROLOGIC RH(D): CPT

## 2020-01-01 PROCEDURE — 71000033 HC RECOVERY, INTIAL HOUR: Performed by: SURGERY

## 2020-01-01 PROCEDURE — 36561 INSERT TUNNELED CV CATH: CPT | Mod: RT,,, | Performed by: SURGERY

## 2020-01-01 PROCEDURE — 99213 OFFICE O/P EST LOW 20 MIN: CPT | Mod: S$GLB,,, | Performed by: NURSE PRACTITIONER

## 2020-01-01 PROCEDURE — 63600175 PHARM REV CODE 636 W HCPCS: Performed by: INTERNAL MEDICINE

## 2020-01-01 PROCEDURE — 99214 OFFICE O/P EST MOD 30 MIN: CPT | Mod: ,,, | Performed by: INTERNAL MEDICINE

## 2020-01-01 PROCEDURE — A9552 F18 FDG: HCPCS | Mod: PO

## 2020-01-01 PROCEDURE — A4216 STERILE WATER/SALINE, 10 ML: HCPCS | Performed by: NURSE PRACTITIONER

## 2020-01-01 PROCEDURE — U0003 INFECTIOUS AGENT DETECTION BY NUCLEIC ACID (DNA OR RNA); SEVERE ACUTE RESPIRATORY SYNDROME CORONAVIRUS 2 (SARS-COV-2) (CORONAVIRUS DISEASE [COVID-19]), AMPLIFIED PROBE TECHNIQUE, MAKING USE OF HIGH THROUGHPUT TECHNOLOGIES AS DESCRIBED BY CMS-2020-01-R: HCPCS

## 2020-01-01 PROCEDURE — 71000039 HC RECOVERY, EACH ADD'L HOUR: Performed by: SURGERY

## 2020-01-01 PROCEDURE — 83735 ASSAY OF MAGNESIUM: CPT

## 2020-01-01 PROCEDURE — 94761 N-INVAS EAR/PLS OXIMETRY MLT: CPT

## 2020-01-01 PROCEDURE — 25000003 PHARM REV CODE 250: Performed by: INTERNAL MEDICINE

## 2020-01-01 PROCEDURE — 36561 PR INSERT TUNNELED CV CATH WITH PORT: ICD-10-PCS | Mod: RT,,, | Performed by: SURGERY

## 2020-01-01 PROCEDURE — 99203 OFFICE O/P NEW LOW 30 MIN: CPT | Mod: S$GLB,,, | Performed by: SURGERY

## 2020-01-01 PROCEDURE — 27000080 OPTIME MED/SURG SUP & DEVICES GENERAL CLASSIFICATION: Performed by: SURGERY

## 2020-01-01 PROCEDURE — 85730 THROMBOPLASTIN TIME PARTIAL: CPT | Mod: 91

## 2020-01-01 PROCEDURE — 25000003 PHARM REV CODE 250: Performed by: ANESTHESIOLOGY

## 2020-01-01 PROCEDURE — 99205 OFFICE O/P NEW HI 60 MIN: CPT | Mod: S$GLB,,, | Performed by: RADIOLOGY

## 2020-01-01 PROCEDURE — 99285 EMERGENCY DEPT VISIT HI MDM: CPT | Mod: 25

## 2020-01-01 PROCEDURE — 25000242 PHARM REV CODE 250 ALT 637 W/ HCPCS: Performed by: EMERGENCY MEDICINE

## 2020-01-01 PROCEDURE — 71000016 HC POSTOP RECOV ADDL HR

## 2020-01-01 PROCEDURE — 99215 OFFICE O/P EST HI 40 MIN: CPT | Mod: S$GLB,,, | Performed by: NURSE PRACTITIONER

## 2020-01-01 PROCEDURE — 86920 COMPATIBILITY TEST SPIN: CPT

## 2020-01-01 PROCEDURE — 63600175 PHARM REV CODE 636 W HCPCS: Performed by: RADIOLOGY

## 2020-01-01 PROCEDURE — 99212 PR OFFICE/OUTPT VISIT, EST, LEVL II, 10-19 MIN: ICD-10-PCS | Mod: S$GLB,,, | Performed by: INTERNAL MEDICINE

## 2020-01-01 PROCEDURE — 27201107 HC STYLET, STANDARD: Performed by: ANESTHESIOLOGY

## 2020-01-01 PROCEDURE — 25000003 PHARM REV CODE 250

## 2020-01-01 PROCEDURE — 99214 PR OFFICE/OUTPT VISIT, EST, LEVL IV, 30-39 MIN: ICD-10-PCS | Mod: ,,, | Performed by: INTERNAL MEDICINE

## 2020-01-01 PROCEDURE — 99205 OFFICE O/P NEW HI 60 MIN: CPT | Mod: 25,S$GLB,, | Performed by: INTERNAL MEDICINE

## 2020-01-01 PROCEDURE — 71000015 HC POSTOP RECOV 1ST HR: Performed by: SURGERY

## 2020-01-01 PROCEDURE — A4216 STERILE WATER/SALINE, 10 ML: HCPCS | Performed by: SURGERY

## 2020-01-01 PROCEDURE — 96374 THER/PROPH/DIAG INJ IV PUSH: CPT | Mod: 59

## 2020-01-01 PROCEDURE — 70553 MRI BRAIN STEM W/O & W/DYE: CPT | Mod: TC

## 2020-01-01 PROCEDURE — 99213 PR OFFICE/OUTPT VISIT, EST, LEVL III, 20-29 MIN: ICD-10-PCS | Mod: S$GLB,,, | Performed by: INTERNAL MEDICINE

## 2020-01-01 PROCEDURE — 27000673 HC TUBING BLOOD Y: Performed by: ANESTHESIOLOGY

## 2020-01-01 PROCEDURE — 99214 OFFICE O/P EST MOD 30 MIN: CPT | Mod: S$GLB,,, | Performed by: NURSE PRACTITIONER

## 2020-01-01 PROCEDURE — 43239 EGD BIOPSY SINGLE/MULTIPLE: CPT | Performed by: INTERNAL MEDICINE

## 2020-01-01 PROCEDURE — 96361 HYDRATE IV INFUSION ADD-ON: CPT

## 2020-01-01 PROCEDURE — 88305 TISSUE EXAM BY PATHOLOGIST: CPT | Mod: TC

## 2020-01-01 PROCEDURE — 78815 PET IMAGE W/CT SKULL-THIGH: CPT | Mod: TC,PO,PS

## 2020-01-01 PROCEDURE — 87118 MYCOBACTERIC IDENTIFICATION: CPT

## 2020-01-01 PROCEDURE — 25000003 PHARM REV CODE 250: Performed by: EMERGENCY MEDICINE

## 2020-01-01 PROCEDURE — 99201 PR OFFICE/OUTPT VISIT,NEW,LEVL I: ICD-10-PCS | Mod: S$GLB,,, | Performed by: PHYSICAL MEDICINE & REHABILITATION

## 2020-01-01 PROCEDURE — 71046 X-RAY EXAM CHEST 2 VIEWS: CPT | Mod: TC,PO

## 2020-01-01 PROCEDURE — 87070 CULTURE OTHR SPECIMN AEROBIC: CPT

## 2020-01-01 PROCEDURE — 87116 MYCOBACTERIA CULTURE: CPT

## 2020-01-01 PROCEDURE — 99212 OFFICE O/P EST SF 10 MIN: CPT | Mod: S$GLB,,, | Performed by: INTERNAL MEDICINE

## 2020-01-01 PROCEDURE — 25000003 PHARM REV CODE 250: Performed by: SURGERY

## 2020-01-01 PROCEDURE — 87040 BLOOD CULTURE FOR BACTERIA: CPT

## 2020-01-01 PROCEDURE — 27201423 OPTIME MED/SURG SUP & DEVICES STERILE SUPPLY: Performed by: SURGERY

## 2020-01-01 PROCEDURE — 27000671 HC TUBING MICROBORE EXT: Performed by: ANESTHESIOLOGY

## 2020-01-01 PROCEDURE — 99214 PR OFFICE/OUTPT VISIT, EST, LEVL IV, 30-39 MIN: ICD-10-PCS | Mod: 25,S$GLB,, | Performed by: INTERNAL MEDICINE

## 2020-01-01 PROCEDURE — 87040 BLOOD CULTURE FOR BACTERIA: CPT | Mod: 59

## 2020-01-01 PROCEDURE — 99213 PR OFFICE/OUTPT VISIT, EST, LEVL III, 20-29 MIN: ICD-10-PCS | Mod: S$GLB,,, | Performed by: ORTHOPAEDIC SURGERY

## 2020-01-01 PROCEDURE — 71000015 HC POSTOP RECOV 1ST HR: Performed by: INTERNAL MEDICINE

## 2020-01-01 PROCEDURE — 99205 PR OFFICE/OUTPT VISIT, NEW, LEVL V, 60-74 MIN: ICD-10-PCS | Mod: S$GLB,,, | Performed by: RADIOLOGY

## 2020-01-01 PROCEDURE — 96372 THER/PROPH/DIAG INJ SC/IM: CPT

## 2020-01-01 PROCEDURE — 77001 CHG FLUOROGUIDE CNTRL VEN ACCESS,PLACE,REPLACE,REMOVE: ICD-10-PCS | Mod: 26,,, | Performed by: SURGERY

## 2020-01-01 PROCEDURE — 37000008 HC ANESTHESIA 1ST 15 MINUTES: Performed by: SURGERY

## 2020-01-01 PROCEDURE — 25000003 PHARM REV CODE 250: Performed by: NURSE ANESTHETIST, CERTIFIED REGISTERED

## 2020-01-01 PROCEDURE — 25500020 PHARM REV CODE 255: Performed by: EMERGENCY MEDICINE

## 2020-01-01 PROCEDURE — 25000003 PHARM REV CODE 250: Performed by: RADIOLOGY

## 2020-01-01 PROCEDURE — 89051 BODY FLUID CELL COUNT: CPT

## 2020-01-01 PROCEDURE — 27000653 HC CATH, IV CATHLN: Performed by: ANESTHESIOLOGY

## 2020-01-01 PROCEDURE — 99213 PR OFFICE/OUTPT VISIT, EST, LEVL III, 20-29 MIN: ICD-10-PCS | Mod: S$GLB,,, | Performed by: NURSE PRACTITIONER

## 2020-01-01 PROCEDURE — 82728 ASSAY OF FERRITIN: CPT

## 2020-01-01 PROCEDURE — 93005 ELECTROCARDIOGRAM TRACING: CPT | Mod: 59

## 2020-01-01 PROCEDURE — 94640 AIRWAY INHALATION TREATMENT: CPT

## 2020-01-01 PROCEDURE — 96365 THER/PROPH/DIAG IV INF INIT: CPT

## 2020-01-01 PROCEDURE — 27000284 HC CANNULA NASAL: Performed by: ANESTHESIOLOGY

## 2020-01-01 PROCEDURE — A9585 GADOBUTROL INJECTION: HCPCS | Performed by: INTERNAL MEDICINE

## 2020-01-01 PROCEDURE — 83540 ASSAY OF IRON: CPT

## 2020-01-01 PROCEDURE — 88305 TISSUE EXAM BY PATHOLOGIST: CPT | Mod: TC,59

## 2020-01-01 PROCEDURE — 77001 FLUOROGUIDE FOR VEIN DEVICE: CPT | Mod: 26,,, | Performed by: SURGERY

## 2020-01-01 PROCEDURE — 32552 REMOVE LUNG CATHETER: CPT

## 2020-01-01 PROCEDURE — 77001 FLUOROGUIDE FOR VEIN DEVICE: CPT | Mod: TC

## 2020-01-01 PROCEDURE — 63600175 PHARM REV CODE 636 W HCPCS: Mod: JG | Performed by: INTERNAL MEDICINE

## 2020-01-01 DEVICE — PORT PLASTIC MRI SINGLE LUM 8FR 0605420: Type: IMPLANTABLE DEVICE | Site: SUBCLAVIAN | Status: FUNCTIONAL

## 2020-01-01 RX ORDER — IBUPROFEN 200 MG
16 TABLET ORAL
Status: DISCONTINUED | OUTPATIENT
Start: 2020-01-01 | End: 2020-01-01 | Stop reason: HOSPADM

## 2020-01-01 RX ORDER — POTASSIUM CHLORIDE 20 MEQ/1
40 TABLET, EXTENDED RELEASE ORAL
Status: DISCONTINUED | OUTPATIENT
Start: 2020-01-01 | End: 2020-01-01 | Stop reason: HOSPADM

## 2020-01-01 RX ORDER — HEPARIN 100 UNIT/ML
500 SYRINGE INTRAVENOUS
Status: CANCELLED | OUTPATIENT
Start: 2020-01-01

## 2020-01-01 RX ORDER — DIPHENHYDRAMINE HYDROCHLORIDE 50 MG/ML
25 INJECTION INTRAMUSCULAR; INTRAVENOUS
Status: CANCELLED | OUTPATIENT
Start: 2020-01-01

## 2020-01-01 RX ORDER — SODIUM CHLORIDE 0.9 % (FLUSH) 0.9 %
10 SYRINGE (ML) INJECTION
Status: CANCELLED | OUTPATIENT
Start: 2020-01-01

## 2020-01-01 RX ORDER — ONDANSETRON 2 MG/ML
4 INJECTION INTRAMUSCULAR; INTRAVENOUS DAILY PRN
Status: DISCONTINUED | OUTPATIENT
Start: 2020-01-01 | End: 2020-01-01 | Stop reason: HOSPADM

## 2020-01-01 RX ORDER — ACETAMINOPHEN 325 MG/1
650 TABLET ORAL EVERY 4 HOURS PRN
Status: DISCONTINUED | OUTPATIENT
Start: 2020-01-01 | End: 2020-01-01 | Stop reason: HOSPADM

## 2020-01-01 RX ORDER — MAGNESIUM SULFATE 1 G/100ML
1 INJECTION INTRAVENOUS
Status: DISCONTINUED | OUTPATIENT
Start: 2020-01-01 | End: 2020-01-01 | Stop reason: HOSPADM

## 2020-01-01 RX ORDER — MINERAL OIL
180 ENEMA (ML) RECTAL DAILY
COMMUNITY

## 2020-01-01 RX ORDER — LEVOFLOXACIN 5 MG/ML
750 INJECTION, SOLUTION INTRAVENOUS
Status: COMPLETED | OUTPATIENT
Start: 2020-01-01 | End: 2020-01-01

## 2020-01-01 RX ORDER — ONDANSETRON HYDROCHLORIDE 8 MG/1
8 TABLET, FILM COATED ORAL EVERY 8 HOURS PRN
Qty: 30 TABLET | Refills: 5 | Status: SHIPPED | OUTPATIENT
Start: 2020-01-01 | End: 2021-08-13

## 2020-01-01 RX ORDER — PANTOPRAZOLE SODIUM 40 MG/1
40 TABLET, DELAYED RELEASE ORAL DAILY
Status: DISCONTINUED | OUTPATIENT
Start: 2020-01-01 | End: 2020-01-01 | Stop reason: HOSPADM

## 2020-01-01 RX ORDER — ASPIRIN 325 MG
325 TABLET ORAL
Status: COMPLETED | OUTPATIENT
Start: 2020-01-01 | End: 2020-01-01

## 2020-01-01 RX ORDER — TRAMADOL HYDROCHLORIDE 50 MG/1
50 TABLET ORAL EVERY 6 HOURS PRN
Qty: 30 TABLET | Refills: 0 | Status: SHIPPED | OUTPATIENT
Start: 2020-01-01 | End: 2021-09-14

## 2020-01-01 RX ORDER — HEPARIN 100 UNIT/ML
5 SYRINGE INTRAVENOUS
Status: CANCELLED | OUTPATIENT
Start: 2020-01-01

## 2020-01-01 RX ORDER — ACETAMINOPHEN 325 MG/1
650 TABLET ORAL
Status: COMPLETED | OUTPATIENT
Start: 2020-01-01 | End: 2020-01-01

## 2020-01-01 RX ORDER — EPINEPHRINE 0.3 MG/.3ML
0.3 INJECTION SUBCUTANEOUS ONCE AS NEEDED
Status: CANCELLED | OUTPATIENT
Start: 2020-01-01

## 2020-01-01 RX ORDER — POTASSIUM CHLORIDE 20 MEQ/1
20 TABLET, EXTENDED RELEASE ORAL
Status: DISCONTINUED | OUTPATIENT
Start: 2020-01-01 | End: 2020-01-01 | Stop reason: HOSPADM

## 2020-01-01 RX ORDER — HEPARIN 100 UNIT/ML
500 SYRINGE INTRAVENOUS
Status: DISCONTINUED | OUTPATIENT
Start: 2020-01-01 | End: 2020-01-01 | Stop reason: HOSPADM

## 2020-01-01 RX ORDER — LANOLIN ALCOHOL/MO/W.PET/CERES
800 CREAM (GRAM) TOPICAL
Status: DISCONTINUED | OUTPATIENT
Start: 2020-01-01 | End: 2020-01-01 | Stop reason: HOSPADM

## 2020-01-01 RX ORDER — OXYCODONE HYDROCHLORIDE 5 MG/1
5 TABLET ORAL
Status: DISCONTINUED | OUTPATIENT
Start: 2020-01-01 | End: 2020-01-01 | Stop reason: SDUPTHER

## 2020-01-01 RX ORDER — ONDANSETRON 2 MG/ML
INJECTION INTRAMUSCULAR; INTRAVENOUS
Status: DISCONTINUED | OUTPATIENT
Start: 2020-01-01 | End: 2020-01-01

## 2020-01-01 RX ORDER — HYDROCODONE BITARTRATE AND ACETAMINOPHEN 500; 5 MG/1; MG/1
TABLET ORAL
Status: ACTIVE | OUTPATIENT
Start: 2020-01-01

## 2020-01-01 RX ORDER — BUPIVACAINE HYDROCHLORIDE AND EPINEPHRINE 2.5; 5 MG/ML; UG/ML
INJECTION, SOLUTION EPIDURAL; INFILTRATION; INTRACAUDAL; PERINEURAL
Status: DISCONTINUED | OUTPATIENT
Start: 2020-01-01 | End: 2020-01-01 | Stop reason: HOSPADM

## 2020-01-01 RX ORDER — SODIUM CHLORIDE 9 MG/ML
INJECTION, SOLUTION INTRAVENOUS CONTINUOUS
Status: CANCELLED | OUTPATIENT
Start: 2020-01-01

## 2020-01-01 RX ORDER — SODIUM CHLORIDE 0.9 % (FLUSH) 0.9 %
10 SYRINGE (ML) INJECTION
Status: DISCONTINUED | OUTPATIENT
Start: 2020-01-01 | End: 2020-01-01 | Stop reason: HOSPADM

## 2020-01-01 RX ORDER — PROMETHAZINE HYDROCHLORIDE 25 MG/1
25 TABLET ORAL EVERY 6 HOURS PRN
Status: DISCONTINUED | OUTPATIENT
Start: 2020-01-01 | End: 2020-01-01 | Stop reason: HOSPADM

## 2020-01-01 RX ORDER — ONDANSETRON 2 MG/ML
4 INJECTION INTRAMUSCULAR; INTRAVENOUS EVERY 6 HOURS PRN
Status: DISCONTINUED | OUTPATIENT
Start: 2020-01-01 | End: 2020-01-01 | Stop reason: HOSPADM

## 2020-01-01 RX ORDER — ALBUTEROL SULFATE 90 UG/1
2 AEROSOL, METERED RESPIRATORY (INHALATION)
Qty: 18 G | Refills: 3 | Status: SHIPPED | OUTPATIENT
Start: 2020-01-01

## 2020-01-01 RX ORDER — CEFAZOLIN SODIUM 2 G/50ML
2 SOLUTION INTRAVENOUS
Status: COMPLETED | OUTPATIENT
Start: 2020-01-01 | End: 2020-01-01

## 2020-01-01 RX ORDER — IPRATROPIUM BROMIDE AND ALBUTEROL SULFATE 2.5; .5 MG/3ML; MG/3ML
3 SOLUTION RESPIRATORY (INHALATION)
Status: COMPLETED | OUTPATIENT
Start: 2020-01-01 | End: 2020-01-01

## 2020-01-01 RX ORDER — GADOBUTROL 604.72 MG/ML
11 INJECTION INTRAVENOUS
Status: COMPLETED | OUTPATIENT
Start: 2020-01-01 | End: 2020-01-01

## 2020-01-01 RX ORDER — OXYCODONE HYDROCHLORIDE 5 MG/1
5 TABLET ORAL EVERY 4 HOURS PRN
Status: DISCONTINUED | OUTPATIENT
Start: 2020-01-01 | End: 2020-01-01 | Stop reason: SDUPTHER

## 2020-01-01 RX ORDER — HEPARIN SODIUM 1000 [USP'U]/ML
INJECTION, SOLUTION INTRAVENOUS; SUBCUTANEOUS
Status: DISCONTINUED | OUTPATIENT
Start: 2020-01-01 | End: 2020-01-01 | Stop reason: HOSPADM

## 2020-01-01 RX ORDER — SODIUM CHLORIDE 9 MG/ML
INJECTION, SOLUTION INTRAMUSCULAR; INTRAVENOUS; SUBCUTANEOUS
Status: DISCONTINUED | OUTPATIENT
Start: 2020-01-01 | End: 2020-01-01 | Stop reason: HOSPADM

## 2020-01-01 RX ORDER — PREDNISONE 20 MG/1
20 TABLET ORAL DAILY
Qty: 5 TABLET | Refills: 0 | Status: SHIPPED | OUTPATIENT
Start: 2020-01-01 | End: 2020-01-01 | Stop reason: SDUPTHER

## 2020-01-01 RX ORDER — MAGNESIUM SULFATE HEPTAHYDRATE 40 MG/ML
2 INJECTION, SOLUTION INTRAVENOUS
Status: DISCONTINUED | OUTPATIENT
Start: 2020-01-01 | End: 2020-01-01 | Stop reason: HOSPADM

## 2020-01-01 RX ORDER — FENTANYL CITRATE 50 UG/ML
25 INJECTION, SOLUTION INTRAMUSCULAR; INTRAVENOUS
Status: DISCONTINUED | OUTPATIENT
Start: 2020-01-01 | End: 2020-01-01 | Stop reason: HOSPADM

## 2020-01-01 RX ORDER — DEXAMETHASONE 4 MG/1
8 TABLET ORAL 2 TIMES DAILY
Qty: 60 TABLET | Refills: 2 | Status: SHIPPED | OUTPATIENT
Start: 2020-01-01 | End: 2020-01-01

## 2020-01-01 RX ORDER — HYDROCODONE BITARTRATE AND ACETAMINOPHEN 5; 325 MG/1; MG/1
1 TABLET ORAL EVERY 4 HOURS PRN
Qty: 30 TABLET | Refills: 0 | Status: SHIPPED | OUTPATIENT
Start: 2020-01-01 | End: 2020-01-01

## 2020-01-01 RX ORDER — GLUCAGON 1 MG
1 KIT INJECTION
Status: DISCONTINUED | OUTPATIENT
Start: 2020-01-01 | End: 2020-01-01 | Stop reason: HOSPADM

## 2020-01-01 RX ORDER — MIDAZOLAM HYDROCHLORIDE 1 MG/ML
INJECTION INTRAMUSCULAR; INTRAVENOUS
Status: DISCONTINUED | OUTPATIENT
Start: 2020-01-01 | End: 2020-01-01

## 2020-01-01 RX ORDER — COLCHICINE 0.6 MG/1
0.6 TABLET, FILM COATED ORAL DAILY
Status: DISCONTINUED | OUTPATIENT
Start: 2020-01-01 | End: 2020-01-01 | Stop reason: HOSPADM

## 2020-01-01 RX ORDER — ALBUTEROL SULFATE 90 UG/1
2 AEROSOL, METERED RESPIRATORY (INHALATION) EVERY 6 HOURS PRN
Status: DISCONTINUED | OUTPATIENT
Start: 2020-01-01 | End: 2020-01-01 | Stop reason: HOSPADM

## 2020-01-01 RX ORDER — DIPHENHYDRAMINE HYDROCHLORIDE 50 MG/ML
6.25 INJECTION INTRAMUSCULAR; INTRAVENOUS
Status: DISCONTINUED | OUTPATIENT
Start: 2020-01-01 | End: 2020-01-01 | Stop reason: HOSPADM

## 2020-01-01 RX ORDER — POLYETHYLENE GLYCOL 3350 17 G/17G
17 POWDER, FOR SOLUTION ORAL DAILY
COMMUNITY

## 2020-01-01 RX ORDER — POTASSIUM CHLORIDE 20 MEQ/15ML
40 SOLUTION ORAL
Status: DISCONTINUED | OUTPATIENT
Start: 2020-01-01 | End: 2020-01-01 | Stop reason: HOSPADM

## 2020-01-01 RX ORDER — AZITHROMYCIN 250 MG/1
500 TABLET, FILM COATED ORAL
Status: DISCONTINUED | OUTPATIENT
Start: 2020-01-01 | End: 2020-01-01

## 2020-01-01 RX ORDER — POLYETHYLENE GLYCOL 3350 17 G/17G
17 POWDER, FOR SOLUTION ORAL DAILY
Status: DISCONTINUED | OUTPATIENT
Start: 2020-01-01 | End: 2020-01-01 | Stop reason: HOSPADM

## 2020-01-01 RX ORDER — IBUPROFEN 200 MG
24 TABLET ORAL
Status: DISCONTINUED | OUTPATIENT
Start: 2020-01-01 | End: 2020-01-01 | Stop reason: HOSPADM

## 2020-01-01 RX ORDER — FOLIC ACID 1 MG/1
1 TABLET ORAL DAILY
Status: DISCONTINUED | OUTPATIENT
Start: 2020-01-01 | End: 2020-01-01 | Stop reason: HOSPADM

## 2020-01-01 RX ORDER — PROMETHAZINE HYDROCHLORIDE 25 MG/1
25 TABLET ORAL
Qty: 30 TABLET | Refills: 5 | Status: SHIPPED | OUTPATIENT
Start: 2020-01-01 | End: 2020-01-01

## 2020-01-01 RX ORDER — LIDOCAINE HYDROCHLORIDE 20 MG/ML
JELLY TOPICAL
Status: DISCONTINUED | OUTPATIENT
Start: 2020-01-01 | End: 2020-01-01

## 2020-01-01 RX ORDER — FENTANYL CITRATE 50 UG/ML
INJECTION, SOLUTION INTRAMUSCULAR; INTRAVENOUS CODE/TRAUMA/SEDATION MEDICATION
Status: COMPLETED | OUTPATIENT
Start: 2020-01-01 | End: 2020-01-01

## 2020-01-01 RX ORDER — ACETAMINOPHEN 325 MG/1
650 TABLET ORAL EVERY 8 HOURS PRN
Status: DISCONTINUED | OUTPATIENT
Start: 2020-01-01 | End: 2020-01-01 | Stop reason: HOSPADM

## 2020-01-01 RX ORDER — DEXAMETHASONE SODIUM PHOSPHATE 4 MG/ML
INJECTION, SOLUTION INTRA-ARTICULAR; INTRALESIONAL; INTRAMUSCULAR; INTRAVENOUS; SOFT TISSUE
Status: DISCONTINUED | OUTPATIENT
Start: 2020-01-01 | End: 2020-01-01

## 2020-01-01 RX ORDER — POTASSIUM CHLORIDE 7.45 MG/ML
40 INJECTION INTRAVENOUS
Status: DISCONTINUED | OUTPATIENT
Start: 2020-01-01 | End: 2020-01-01 | Stop reason: HOSPADM

## 2020-01-01 RX ORDER — MIDAZOLAM HYDROCHLORIDE 1 MG/ML
INJECTION INTRAMUSCULAR; INTRAVENOUS CODE/TRAUMA/SEDATION MEDICATION
Status: COMPLETED | OUTPATIENT
Start: 2020-01-01 | End: 2020-01-01

## 2020-01-01 RX ORDER — TALC
6 POWDER (GRAM) TOPICAL NIGHTLY PRN
Status: DISCONTINUED | OUTPATIENT
Start: 2020-01-01 | End: 2020-01-01 | Stop reason: HOSPADM

## 2020-01-01 RX ORDER — HYDROCODONE BITARTRATE AND ACETAMINOPHEN 500; 5 MG/1; MG/1
TABLET ORAL ONCE
Status: COMPLETED | OUTPATIENT
Start: 2020-01-01 | End: 2020-01-01

## 2020-01-01 RX ORDER — LEVOFLOXACIN 5 MG/ML
750 INJECTION, SOLUTION INTRAVENOUS
Status: DISCONTINUED | OUTPATIENT
Start: 2020-01-01 | End: 2020-01-01

## 2020-01-01 RX ORDER — SODIUM CHLORIDE 9 MG/ML
INJECTION, SOLUTION INTRAVENOUS
Status: COMPLETED | OUTPATIENT
Start: 2020-01-01 | End: 2020-01-01

## 2020-01-01 RX ORDER — ONDANSETRON 4 MG/1
4 TABLET, ORALLY DISINTEGRATING ORAL ONCE AS NEEDED
Status: DISCONTINUED | OUTPATIENT
Start: 2020-01-01 | End: 2020-01-01 | Stop reason: HOSPADM

## 2020-01-01 RX ORDER — HYDROCODONE BITARTRATE AND ACETAMINOPHEN 7.5; 325 MG/1; MG/1
1 TABLET ORAL EVERY 6 HOURS PRN
Qty: 60 TABLET | Refills: 0 | Status: SHIPPED | OUTPATIENT
Start: 2020-01-01

## 2020-01-01 RX ORDER — HEPARIN 100 UNIT/ML
5 SYRINGE INTRAVENOUS ONCE
Status: COMPLETED | OUTPATIENT
Start: 2020-01-01 | End: 2020-01-01

## 2020-01-01 RX ORDER — NAPROXEN SODIUM 220 MG
220 TABLET ORAL DAILY
COMMUNITY
End: 2020-01-01

## 2020-01-01 RX ORDER — ONDANSETRON HYDROCHLORIDE 8 MG/1
8 TABLET, FILM COATED ORAL EVERY 8 HOURS PRN
Qty: 30 TABLET | Refills: 5 | Status: SHIPPED | OUTPATIENT
Start: 2020-01-01 | End: 2020-01-01 | Stop reason: SDUPTHER

## 2020-01-01 RX ORDER — LIDOCAINE HYDROCHLORIDE 5 MG/ML
INJECTION, SOLUTION INFILTRATION; PERINEURAL CODE/TRAUMA/SEDATION MEDICATION
Status: COMPLETED | OUTPATIENT
Start: 2020-01-01 | End: 2020-01-01

## 2020-01-01 RX ORDER — ONDANSETRON 2 MG/ML
4 INJECTION INTRAMUSCULAR; INTRAVENOUS EVERY 8 HOURS PRN
Status: DISCONTINUED | OUTPATIENT
Start: 2020-01-01 | End: 2020-01-01 | Stop reason: HOSPADM

## 2020-01-01 RX ORDER — METHYLPREDNISOLONE SOD SUCC 125 MG
125 VIAL (EA) INJECTION ONCE AS NEEDED
Status: CANCELLED | OUTPATIENT
Start: 2020-01-01

## 2020-01-01 RX ORDER — ENOXAPARIN SODIUM 100 MG/ML
1 INJECTION SUBCUTANEOUS
Status: COMPLETED | OUTPATIENT
Start: 2020-01-01 | End: 2020-01-01

## 2020-01-01 RX ORDER — HYDROCODONE BITARTRATE AND ACETAMINOPHEN 500; 5 MG/1; MG/1
TABLET ORAL ONCE
Status: CANCELLED | OUTPATIENT
Start: 2020-01-01 | End: 2020-01-01

## 2020-01-01 RX ORDER — LIDOCAINE HYDROCHLORIDE 20 MG/ML
INJECTION, SOLUTION EPIDURAL; INFILTRATION; INTRACAUDAL; PERINEURAL
Status: DISCONTINUED | OUTPATIENT
Start: 2020-01-01 | End: 2020-01-01

## 2020-01-01 RX ORDER — CYANOCOBALAMIN 1000 UG/ML
1000 INJECTION, SOLUTION INTRAMUSCULAR; SUBCUTANEOUS
Status: CANCELLED | OUTPATIENT
Start: 2020-01-01

## 2020-01-01 RX ORDER — POTASSIUM CHLORIDE 7.45 MG/ML
20 INJECTION INTRAVENOUS
Status: DISCONTINUED | OUTPATIENT
Start: 2020-01-01 | End: 2020-01-01 | Stop reason: HOSPADM

## 2020-01-01 RX ORDER — FUROSEMIDE 10 MG/ML
20 INJECTION INTRAMUSCULAR; INTRAVENOUS
Status: ACTIVE | OUTPATIENT
Start: 2020-01-01

## 2020-01-01 RX ORDER — EPHEDRINE SULFATE 50 MG/ML
INJECTION, SOLUTION INTRAVENOUS
Status: DISCONTINUED | OUTPATIENT
Start: 2020-01-01 | End: 2020-01-01

## 2020-01-01 RX ORDER — HYDROCODONE BITARTRATE AND ACETAMINOPHEN 10; 325 MG/1; MG/1
1 TABLET ORAL EVERY 4 HOURS PRN
Status: DISCONTINUED | OUTPATIENT
Start: 2020-01-01 | End: 2020-01-01 | Stop reason: HOSPADM

## 2020-01-01 RX ORDER — DOCUSATE SODIUM 100 MG/1
200 CAPSULE, LIQUID FILLED ORAL NIGHTLY
Status: DISCONTINUED | OUTPATIENT
Start: 2020-01-01 | End: 2020-01-01 | Stop reason: HOSPADM

## 2020-01-01 RX ORDER — ALBUTEROL SULFATE 90 UG/1
2 AEROSOL, METERED RESPIRATORY (INHALATION) EVERY 4 HOURS PRN
Status: DISCONTINUED | OUTPATIENT
Start: 2020-01-01 | End: 2020-01-01 | Stop reason: HOSPADM

## 2020-01-01 RX ORDER — PANTOPRAZOLE SODIUM 40 MG/1
40 TABLET, DELAYED RELEASE ORAL
Status: DISCONTINUED | OUTPATIENT
Start: 2020-01-01 | End: 2020-01-01 | Stop reason: HOSPADM

## 2020-01-01 RX ORDER — PROPOFOL 10 MG/ML
VIAL (ML) INTRAVENOUS
Status: DISCONTINUED | OUTPATIENT
Start: 2020-01-01 | End: 2020-01-01

## 2020-01-01 RX ORDER — DEXAMETHASONE 4 MG/1
4 TABLET ORAL EVERY 12 HOURS
COMMUNITY
End: 2020-01-01 | Stop reason: SDUPTHER

## 2020-01-01 RX ORDER — PANTOPRAZOLE SODIUM 40 MG/1
40 TABLET, DELAYED RELEASE ORAL DAILY
Qty: 30 TABLET | Refills: 2 | Status: SHIPPED | OUTPATIENT
Start: 2020-01-01 | End: 2021-06-19

## 2020-01-01 RX ORDER — HYDROCODONE BITARTRATE AND ACETAMINOPHEN 5; 325 MG/1; MG/1
1 TABLET ORAL EVERY 4 HOURS PRN
Status: DISCONTINUED | OUTPATIENT
Start: 2020-01-01 | End: 2020-01-01 | Stop reason: HOSPADM

## 2020-01-01 RX ORDER — CYANOCOBALAMIN 1000 UG/ML
1000 INJECTION, SOLUTION INTRAMUSCULAR; SUBCUTANEOUS
Status: SHIPPED | OUTPATIENT
Start: 2020-01-01

## 2020-01-01 RX ORDER — FUROSEMIDE 10 MG/ML
20 INJECTION INTRAMUSCULAR; INTRAVENOUS
Status: CANCELLED | OUTPATIENT
Start: 2020-01-01

## 2020-01-01 RX ORDER — POTASSIUM CHLORIDE 7.45 MG/ML
10 INJECTION INTRAVENOUS
Status: DISCONTINUED | OUTPATIENT
Start: 2020-01-01 | End: 2020-01-01 | Stop reason: CLARIF

## 2020-01-01 RX ORDER — SUCCINYLCHOLINE CHLORIDE 20 MG/ML
INJECTION INTRAMUSCULAR; INTRAVENOUS
Status: DISCONTINUED | OUTPATIENT
Start: 2020-01-01 | End: 2020-01-01

## 2020-01-01 RX ORDER — PREDNISONE 20 MG/1
20 TABLET ORAL DAILY
Qty: 5 TABLET | Refills: 0 | Status: ON HOLD | OUTPATIENT
Start: 2020-01-01 | End: 2020-01-01

## 2020-01-01 RX ORDER — MAGNESIUM 250 MG
1 TABLET ORAL NIGHTLY
COMMUNITY

## 2020-01-01 RX ORDER — CYANOCOBALAMIN 1000 UG/ML
1000 INJECTION, SOLUTION INTRAMUSCULAR; SUBCUTANEOUS
Status: DISCONTINUED | OUTPATIENT
Start: 2020-01-01 | End: 2020-01-01

## 2020-01-01 RX ORDER — DIPHENHYDRAMINE HYDROCHLORIDE 50 MG/ML
50 INJECTION INTRAMUSCULAR; INTRAVENOUS ONCE AS NEEDED
Status: CANCELLED | OUTPATIENT
Start: 2020-01-01

## 2020-01-01 RX ORDER — HYDROCODONE BITARTRATE AND ACETAMINOPHEN 5; 325 MG/1; MG/1
1 TABLET ORAL EVERY 6 HOURS PRN
Status: DISCONTINUED | OUTPATIENT
Start: 2020-01-01 | End: 2020-01-01 | Stop reason: HOSPADM

## 2020-01-01 RX ORDER — DOCUSATE SODIUM 100 MG/1
200 CAPSULE, LIQUID FILLED ORAL NIGHTLY
COMMUNITY

## 2020-01-01 RX ORDER — COLCHICINE 0.6 MG/1
0.6 TABLET ORAL DAILY
Qty: 30 TABLET | Refills: 2 | Status: SHIPPED | OUTPATIENT
Start: 2020-01-01 | End: 2021-07-09

## 2020-01-01 RX ORDER — DEXAMETHASONE 4 MG/1
TABLET ORAL
Qty: 60 TABLET | Refills: 1 | Status: SHIPPED | OUTPATIENT
Start: 2020-01-01

## 2020-01-01 RX ORDER — ACETAMINOPHEN 500 MG
1000 TABLET ORAL
Status: COMPLETED | OUTPATIENT
Start: 2020-01-01 | End: 2020-01-01

## 2020-01-01 RX ORDER — DIPHENHYDRAMINE HYDROCHLORIDE 50 MG/ML
25 INJECTION INTRAMUSCULAR; INTRAVENOUS
Status: COMPLETED | OUTPATIENT
Start: 2020-01-01 | End: 2020-01-01

## 2020-01-01 RX ORDER — LORAZEPAM/0.9% SODIUM CHLORIDE 100MG/0.1L
2 PLASTIC BAG, INJECTION (ML) INTRAVENOUS
Status: DISCONTINUED | OUTPATIENT
Start: 2020-01-01 | End: 2020-01-01 | Stop reason: CLARIF

## 2020-01-01 RX ORDER — MAGNESIUM SULFATE HEPTAHYDRATE 40 MG/ML
4 INJECTION, SOLUTION INTRAVENOUS
Status: DISCONTINUED | OUTPATIENT
Start: 2020-01-01 | End: 2020-01-01 | Stop reason: HOSPADM

## 2020-01-01 RX ORDER — CETIRIZINE HYDROCHLORIDE 10 MG/1
10 TABLET ORAL DAILY
Status: DISCONTINUED | OUTPATIENT
Start: 2020-01-01 | End: 2020-01-01 | Stop reason: HOSPADM

## 2020-01-01 RX ORDER — CYANOCOBALAMIN 1000 UG/ML
1000 INJECTION, SOLUTION INTRAMUSCULAR; SUBCUTANEOUS
Status: DISCONTINUED | OUTPATIENT
Start: 2020-01-01 | End: 2020-01-01 | Stop reason: HOSPADM

## 2020-01-01 RX ORDER — FOLIC ACID 1 MG/1
1 TABLET ORAL DAILY
Qty: 100 TABLET | Refills: 3 | Status: SHIPPED | OUTPATIENT
Start: 2020-01-01 | End: 2021-05-12

## 2020-01-01 RX ORDER — SODIUM CHLORIDE, SODIUM LACTATE, POTASSIUM CHLORIDE, CALCIUM CHLORIDE 600; 310; 30; 20 MG/100ML; MG/100ML; MG/100ML; MG/100ML
INJECTION, SOLUTION INTRAVENOUS CONTINUOUS PRN
Status: DISCONTINUED | OUTPATIENT
Start: 2020-01-01 | End: 2020-01-01

## 2020-01-01 RX ORDER — FENTANYL CITRATE 50 UG/ML
INJECTION, SOLUTION INTRAMUSCULAR; INTRAVENOUS
Status: DISCONTINUED | OUTPATIENT
Start: 2020-01-01 | End: 2020-01-01

## 2020-01-01 RX ORDER — PHENYLEPHRINE HYDROCHLORIDE 10 MG/ML
INJECTION INTRAVENOUS
Status: DISCONTINUED | OUTPATIENT
Start: 2020-01-01 | End: 2020-01-01

## 2020-01-01 RX ORDER — DEXAMETHASONE 4 MG/1
4 TABLET ORAL EVERY 12 HOURS
Status: DISCONTINUED | OUTPATIENT
Start: 2020-01-01 | End: 2020-01-01

## 2020-01-01 RX ORDER — ONDANSETRON 4 MG/1
8 TABLET, ORALLY DISINTEGRATING ORAL EVERY 8 HOURS PRN
Status: DISCONTINUED | OUTPATIENT
Start: 2020-01-01 | End: 2020-01-01 | Stop reason: HOSPADM

## 2020-01-01 RX ORDER — ACETAMINOPHEN 325 MG/1
650 TABLET ORAL
Status: CANCELLED | OUTPATIENT
Start: 2020-01-01

## 2020-01-01 RX ORDER — PROMETHAZINE HYDROCHLORIDE 25 MG/1
25 TABLET ORAL EVERY 6 HOURS PRN
COMMUNITY

## 2020-01-01 RX ADMIN — SODIUM CHLORIDE, PRESERVATIVE FREE 10 ML: 5 INJECTION INTRAVENOUS at 01:06

## 2020-01-01 RX ADMIN — FENTANYL CITRATE 50 MCG: 50 INJECTION INTRAMUSCULAR; INTRAVENOUS at 12:05

## 2020-01-01 RX ADMIN — MIDAZOLAM HYDROCHLORIDE 2 MG: 1 INJECTION, SOLUTION INTRAMUSCULAR; INTRAVENOUS at 12:05

## 2020-01-01 RX ADMIN — LIDOCAINE HYDROCHLORIDE 10 ML: 5 INJECTION, SOLUTION INFILTRATION; PERINEURAL at 01:05

## 2020-01-01 RX ADMIN — ASPIRIN 325 MG ORAL TABLET 325 MG: 325 PILL ORAL at 02:08

## 2020-01-01 RX ADMIN — CETIRIZINE HYDROCHLORIDE 10 MG: 10 TABLET, FILM COATED ORAL at 09:08

## 2020-01-01 RX ADMIN — SODIUM CHLORIDE: 0.9 INJECTION, SOLUTION INTRAVENOUS at 11:06

## 2020-01-01 RX ADMIN — DOCETAXEL ANHYDROUS 120 MG: 10 INJECTION, SOLUTION INTRAVENOUS at 09:09

## 2020-01-01 RX ADMIN — EPHEDRINE SULFATE 15 MG: 50 INJECTION, SOLUTION INTRAVENOUS at 09:05

## 2020-01-01 RX ADMIN — GADOBUTROL 11 ML: 604.72 INJECTION INTRAVENOUS at 10:09

## 2020-01-01 RX ADMIN — DEXAMETHASONE SODIUM PHOSPHATE 8 MG: 4 INJECTION, SOLUTION INTRA-ARTICULAR; INTRALESIONAL; INTRAMUSCULAR; INTRAVENOUS; SOFT TISSUE at 10:09

## 2020-01-01 RX ADMIN — HEPARIN 500 UNITS: 100 SYRINGE at 02:07

## 2020-01-01 RX ADMIN — APREPITANT 130 MG: 130 INJECTION, EMULSION INTRAVENOUS at 11:06

## 2020-01-01 RX ADMIN — PROPOFOL 10 MG: 10 INJECTION, EMULSION INTRAVENOUS at 02:06

## 2020-01-01 RX ADMIN — PROPOFOL 20 MG: 10 INJECTION, EMULSION INTRAVENOUS at 02:06

## 2020-01-01 RX ADMIN — SODIUM CHLORIDE, PRESERVATIVE FREE 10 ML: 5 INJECTION INTRAVENOUS at 02:07

## 2020-01-01 RX ADMIN — APREPITANT 130 MG: 130 INJECTION, EMULSION INTRAVENOUS at 11:08

## 2020-01-01 RX ADMIN — LIDOCAINE HYDROCHLORIDE 60 ML: 20 JELLY TOPICAL at 09:05

## 2020-01-01 RX ADMIN — SODIUM CHLORIDE 1200 MG: 9 INJECTION, SOLUTION INTRAVENOUS at 11:08

## 2020-01-01 RX ADMIN — SODIUM CHLORIDE 1200 MG: 9 INJECTION, SOLUTION INTRAVENOUS at 12:06

## 2020-01-01 RX ADMIN — SODIUM CHLORIDE 1200 MG: 9 INJECTION, SOLUTION INTRAVENOUS at 10:09

## 2020-01-01 RX ADMIN — ONDANSETRON 16 MG: 2 INJECTION INTRAMUSCULAR; INTRAVENOUS at 11:09

## 2020-01-01 RX ADMIN — EPHEDRINE SULFATE 10 MG: 50 INJECTION, SOLUTION INTRAVENOUS at 09:05

## 2020-01-01 RX ADMIN — PHENYLEPHRINE HYDROCHLORIDE 100 MCG: 10 INJECTION INTRAVENOUS at 09:05

## 2020-01-01 RX ADMIN — FENTANYL CITRATE 50 MCG: 50 INJECTION INTRAMUSCULAR; INTRAVENOUS at 01:05

## 2020-01-01 RX ADMIN — IOHEXOL 100 ML: 350 INJECTION, SOLUTION INTRAVENOUS at 01:04

## 2020-01-01 RX ADMIN — POLYETHYLENE GLYCOL 3350 17 G: 17 POWDER, FOR SOLUTION ORAL at 09:08

## 2020-01-01 RX ADMIN — SODIUM CHLORIDE, SODIUM LACTATE, POTASSIUM CHLORIDE, AND CALCIUM CHLORIDE: .6; .31; .03; .02 INJECTION, SOLUTION INTRAVENOUS at 09:05

## 2020-01-01 RX ADMIN — SODIUM CHLORIDE 200 MG: 9 INJECTION, SOLUTION INTRAVENOUS at 10:08

## 2020-01-01 RX ADMIN — ONDANSETRON 4 MG: 2 INJECTION INTRAMUSCULAR; INTRAVENOUS at 09:05

## 2020-01-01 RX ADMIN — DEXAMETHASONE 4 MG: 4 TABLET ORAL at 10:08

## 2020-01-01 RX ADMIN — LIDOCAINE HYDROCHLORIDE 80 MG: 20 INJECTION, SOLUTION EPIDURAL; INFILTRATION; INTRACAUDAL; PERINEURAL at 09:05

## 2020-01-01 RX ADMIN — SODIUM CHLORIDE 200 MG: 9 INJECTION, SOLUTION INTRAVENOUS at 12:07

## 2020-01-01 RX ADMIN — PROPOFOL 25 MG: 10 INJECTION, EMULSION INTRAVENOUS at 02:06

## 2020-01-01 RX ADMIN — FENTANYL CITRATE 50 MCG: 50 INJECTION INTRAMUSCULAR; INTRAVENOUS at 09:05

## 2020-01-01 RX ADMIN — PROPOFOL 100 MG: 10 INJECTION, EMULSION INTRAVENOUS at 09:05

## 2020-01-01 RX ADMIN — SODIUM CHLORIDE 200 MG: 9 INJECTION, SOLUTION INTRAVENOUS at 09:09

## 2020-01-01 RX ADMIN — ONDANSETRON 16 MG: 2 INJECTION INTRAMUSCULAR; INTRAVENOUS at 09:09

## 2020-01-01 RX ADMIN — CARBOPLATIN 620 MG: 10 INJECTION, SOLUTION INTRAVENOUS at 12:08

## 2020-01-01 RX ADMIN — DOCUSATE SODIUM 200 MG: 100 CAPSULE, LIQUID FILLED ORAL at 10:08

## 2020-01-01 RX ADMIN — FUROSEMIDE 20 MG: 10 INJECTION, SOLUTION INTRAMUSCULAR; INTRAVENOUS at 09:09

## 2020-01-01 RX ADMIN — LEVOFLOXACIN 750 MG: 750 INJECTION, SOLUTION INTRAVENOUS at 06:08

## 2020-01-01 RX ADMIN — SODIUM CHLORIDE 250 ML/HR: 0.9 INJECTION, SOLUTION INTRAVENOUS at 12:05

## 2020-01-01 RX ADMIN — CARBOPLATIN 695 MG: 10 INJECTION, SOLUTION INTRAVENOUS at 12:06

## 2020-01-01 RX ADMIN — APREPITANT 130 MG: 130 INJECTION, EMULSION INTRAVENOUS at 11:07

## 2020-01-01 RX ADMIN — LEVOFLOXACIN 750 MG: 750 INJECTION, SOLUTION INTRAVENOUS at 02:04

## 2020-01-01 RX ADMIN — MIDAZOLAM HYDROCHLORIDE 1 MG: 1 INJECTION, SOLUTION INTRAMUSCULAR; INTRAVENOUS at 09:05

## 2020-01-01 RX ADMIN — SODIUM CHLORIDE 200 MG: 9 INJECTION, SOLUTION INTRAVENOUS at 10:06

## 2020-01-01 RX ADMIN — SODIUM CHLORIDE, PRESERVATIVE FREE 10 ML: 5 INJECTION INTRAVENOUS at 01:09

## 2020-01-01 RX ADMIN — DEXAMETHASONE SODIUM PHOSPHATE 8 MG: 4 INJECTION, SOLUTION INTRAMUSCULAR; INTRAVENOUS at 09:05

## 2020-01-01 RX ADMIN — SODIUM CHLORIDE: 0.9 INJECTION, SOLUTION INTRAVENOUS at 06:09

## 2020-01-01 RX ADMIN — CYANOCOBALAMIN 1000 MCG: 1000 INJECTION, SOLUTION INTRAMUSCULAR; SUBCUTANEOUS at 11:05

## 2020-01-01 RX ADMIN — DEXAMETHASONE SODIUM PHOSPHATE: 4 INJECTION, SOLUTION INTRA-ARTICULAR; INTRALESIONAL; INTRAMUSCULAR; INTRAVENOUS; SOFT TISSUE at 11:08

## 2020-01-01 RX ADMIN — CYANOCOBALAMIN 1000 MCG: 1000 INJECTION, SOLUTION INTRAMUSCULAR; SUBCUTANEOUS at 12:08

## 2020-01-01 RX ADMIN — APREPITANT 130 MG: 130 INJECTION, EMULSION INTRAVENOUS at 12:06

## 2020-01-01 RX ADMIN — SODIUM CHLORIDE 1000 ML: 0.9 INJECTION, SOLUTION INTRAVENOUS at 08:09

## 2020-01-01 RX ADMIN — PROPOFOL 200 MG: 10 INJECTION, EMULSION INTRAVENOUS at 09:05

## 2020-01-01 RX ADMIN — SODIUM CHLORIDE 1000 ML: 0.9 INJECTION, SOLUTION INTRAVENOUS at 11:09

## 2020-01-01 RX ADMIN — Medication 500 UNITS: at 01:09

## 2020-01-01 RX ADMIN — HEPARIN 500 UNITS: 100 SYRINGE at 11:09

## 2020-01-01 RX ADMIN — SODIUM CHLORIDE, SODIUM LACTATE, POTASSIUM CHLORIDE, AND CALCIUM CHLORIDE: .6; .31; .03; .02 INJECTION, SOLUTION INTRAVENOUS at 08:05

## 2020-01-01 RX ADMIN — CYANOCOBALAMIN 1000 MCG: 1000 INJECTION, SOLUTION INTRAMUSCULAR; SUBCUTANEOUS at 10:06

## 2020-01-01 RX ADMIN — FOLIC ACID 1 MG: 1 TABLET ORAL at 09:08

## 2020-01-01 RX ADMIN — POLYETHYLENE GLYCOL 3350 17 G: 17 POWDER, FOR SOLUTION ORAL at 09:06

## 2020-01-01 RX ADMIN — ENOXAPARIN SODIUM 110 MG: 100 INJECTION SUBCUTANEOUS at 04:06

## 2020-01-01 RX ADMIN — HEPARIN 500 UNITS: 100 SYRINGE at 01:08

## 2020-01-01 RX ADMIN — COLCHICINE 0.6 MG: 0.6 TABLET, FILM COATED ORAL at 09:08

## 2020-01-01 RX ADMIN — PALONOSETRON HYDROCHLORIDE: 0.25 INJECTION INTRAVENOUS at 11:06

## 2020-01-01 RX ADMIN — SODIUM CHLORIDE 1200 MG: 9 INJECTION, SOLUTION INTRAVENOUS at 11:06

## 2020-01-01 RX ADMIN — HEPARIN 500 UNITS: 100 SYRINGE at 02:06

## 2020-01-01 RX ADMIN — DIPHENHYDRAMINE HYDROCHLORIDE 25 MG: 50 INJECTION INTRAMUSCULAR; INTRAVENOUS at 06:09

## 2020-01-01 RX ADMIN — FENTANYL CITRATE 25 MCG: 50 INJECTION INTRAMUSCULAR; INTRAVENOUS at 11:07

## 2020-01-01 RX ADMIN — HEPARIN 500 UNITS: 100 SYRINGE at 01:09

## 2020-01-01 RX ADMIN — SODIUM CHLORIDE, PRESERVATIVE FREE 10 ML: 5 INJECTION INTRAVENOUS at 01:08

## 2020-01-01 RX ADMIN — DEXAMETHASONE SODIUM PHOSPHATE: 4 INJECTION, SOLUTION INTRA-ARTICULAR; INTRALESIONAL; INTRAMUSCULAR; INTRAVENOUS; SOFT TISSUE at 11:07

## 2020-01-01 RX ADMIN — DEXAMETHASONE SODIUM PHOSPHATE 20 MG: 4 INJECTION, SOLUTION INTRA-ARTICULAR; INTRALESIONAL; INTRAMUSCULAR; INTRAVENOUS; SOFT TISSUE at 08:09

## 2020-01-01 RX ADMIN — CARBOPLATIN 615 MG: 10 INJECTION, SOLUTION INTRAVENOUS at 01:07

## 2020-01-01 RX ADMIN — MIDAZOLAM HYDROCHLORIDE 2 MG: 1 INJECTION, SOLUTION INTRAMUSCULAR; INTRAVENOUS at 10:07

## 2020-01-01 RX ADMIN — ACETAMINOPHEN 650 MG: 325 TABLET, FILM COATED ORAL at 06:09

## 2020-01-01 RX ADMIN — MIDAZOLAM HYDROCHLORIDE 1 MG: 1 INJECTION, SOLUTION INTRAMUSCULAR; INTRAVENOUS at 01:05

## 2020-01-01 RX ADMIN — IOHEXOL 100 ML: 350 INJECTION, SOLUTION INTRAVENOUS at 03:08

## 2020-01-01 RX ADMIN — SODIUM CHLORIDE 1200 MG: 9 INJECTION, SOLUTION INTRAVENOUS at 12:07

## 2020-01-01 RX ADMIN — CARBOPLATIN 630 MG: 10 INJECTION, SOLUTION INTRAVENOUS at 12:06

## 2020-01-01 RX ADMIN — FENTANYL CITRATE 25 MCG: 50 INJECTION INTRAMUSCULAR; INTRAVENOUS at 10:07

## 2020-01-01 RX ADMIN — CEFAZOLIN SODIUM 2 G: 2 SOLUTION INTRAVENOUS at 09:05

## 2020-01-01 RX ADMIN — SODIUM CHLORIDE 500 ML: 0.9 INJECTION, SOLUTION INTRAVENOUS at 02:08

## 2020-01-01 RX ADMIN — ACETAMINOPHEN 1000 MG: 500 TABLET, FILM COATED ORAL at 08:05

## 2020-01-01 RX ADMIN — SUCCINYLCHOLINE CHLORIDE 160 MG: 20 INJECTION, SOLUTION INTRAMUSCULAR; INTRAVENOUS at 09:05

## 2020-01-01 RX ADMIN — SODIUM CHLORIDE: 0.9 INJECTION, SOLUTION INTRAVENOUS at 11:07

## 2020-01-01 RX ADMIN — IPRATROPIUM BROMIDE AND ALBUTEROL SULFATE 3 ML: .5; 3 SOLUTION RESPIRATORY (INHALATION) at 10:05

## 2020-01-01 RX ADMIN — CYANOCOBALAMIN 1000 MCG: 1000 INJECTION, SOLUTION INTRAMUSCULAR; SUBCUTANEOUS at 10:09

## 2020-01-01 RX ADMIN — HEPARIN 500 UNITS: 100 SYRINGE at 01:06

## 2020-04-17 NOTE — PROGRESS NOTES
SUBJECTIVE:    Patient ID: Anson Do is a 74 y.o. male.    Chief Complaint: No chief complaint on file.    This is a 74-year-old man who sees Dr. Shipley for his primary care and denies any chronic major medical problems.  Complains of one-week history of cough that he is managing with Tessalon.  Also complains of shortness of breath with exertion and fatigue as well as headaches both frontal and posterior.  Get some anterior chest discomfort at night that improved with movement and is not exertional related.  Denies fever or loss of smell or taste.  No diarrhea no muscle legs are sore throat no chills nausea vomiting or nasal congestion.        Past Medical History:   Diagnosis Date    GERD (gastroesophageal reflux disease)      Social History     Socioeconomic History    Marital status:      Spouse name: Not on file    Number of children: Not on file    Years of education: Not on file    Highest education level: Not on file   Occupational History    Not on file   Social Needs    Financial resource strain: Not on file    Food insecurity:     Worry: Not on file     Inability: Not on file    Transportation needs:     Medical: Not on file     Non-medical: Not on file   Tobacco Use    Smoking status: Former Smoker     Packs/day: 2.00     Last attempt to quit: 1988     Years since quittin.7   Substance and Sexual Activity    Alcohol use: Not on file    Drug use: Not on file    Sexual activity: Not on file   Lifestyle    Physical activity:     Days per week: Not on file     Minutes per session: Not on file    Stress: Not on file   Relationships    Social connections:     Talks on phone: Not on file     Gets together: Not on file     Attends Church service: Not on file     Active member of club or organization: Not on file     Attends meetings of clubs or organizations: Not on file     Relationship status: Not on file   Other Topics Concern    Not on file   Social History Narrative     Not on file     Past Surgical History:   Procedure Laterality Date    BACK SURGERY  1975    hernina repair       Family History   Problem Relation Age of Onset    Allergic rhinitis Neg Hx     Allergies Neg Hx     Angioedema Neg Hx     Asthma Neg Hx     Eczema Neg Hx     Immunodeficiency Neg Hx      There were no vitals filed for this visit.    Review of Systems   Constitutional: Negative for chills, diaphoresis, fatigue, fever and unexpected weight change.   HENT: Negative for trouble swallowing.    Eyes: Negative for visual disturbance.   Respiratory: Negative for shortness of breath.    Cardiovascular: Negative for chest pain.   Gastrointestinal: Negative for abdominal pain, constipation, diarrhea, nausea and vomiting.   Genitourinary: Negative for difficulty urinating.   Musculoskeletal: Negative for arthralgias, back pain, gait problem, joint swelling, myalgias, neck pain and neck stiffness.   Neurological: Negative for dizziness, speech difficulty, weakness, light-headedness, numbness and headaches.          Objective:      Physical Exam   Constitutional: He is oriented to person, place, and time. He appears well-developed and well-nourished.   Neurological: He is alert and oriented to person, place, and time.   He is awake and in no acute distress  Heart S1-S2 regular no murmurs ectopy  Lungs are clear bilaterally good air movement no rales or egophony           Assessment:       1. Viral illness           Plan:     patient will be screened for COVID-19 today   The  Pt was tested for COVID in clinic today and that he/she would be notified of results as soon as available.   Discussed with patient that he/she could have COVID-19 or another viral illness and would take precautions for COVID such as limit travel outside of home.  Drink plenty of fluids. Take tylenol/ibuprofen as needed. Given instruction on when to seek further evaluation in urgent care or ED if develops worsening symptoms such as  shortness of breath, chest pain.   I am referring pt to the covid home monitoring program .         Viral illness  -     COVID-19 Routine Screening

## 2020-04-18 NOTE — TELEPHONE ENCOUNTER
Pt reporting continued cough. Per protocol, continue home care. Reviewed cough care with pt and OTC cough medicines. Pt also taking tessalon pearls to help cough. Pt verbalized understanding.       Reason for Disposition   1] COVID-19 infection diagnosed or suspected AND [2] mild symptoms (fever, cough) AND [2] no trouble breathing or other complications    Additional Information   Negative: SEVERE difficulty breathing (e.g., struggling for each breath, speaks in single words)   Negative: Difficult to awaken or acting confused (e.g., disoriented, slurred speech)   Negative: Bluish (or gray) lips or face now   Negative: Shock suspected (e.g., cold/pale/clammy skin, too weak to stand, low BP, rapid pulse)   Negative: Sounds like a life-threatening emergency to the triager   Negative: SEVERE or constant chest pain (Exception: mild central chest pain, present only when coughing)   Negative: MODERATE difficulty breathing (e.g., speaks in phrases, SOB even at rest, pulse 100-120)   Negative: Patient sounds very sick or weak to the triager   Negative: MILD difficulty breathing (e.g., minimal/no SOB at rest, SOB with walking, pulse <100)   Negative: Chest pain   Negative: Fever > 103 F (39.4 C)   Negative: [1] Fever > 101 F (38.3 C) AND [2] age > 60   Negative: [1] Fever > 100.0 F (37.8 C) AND [2] bedridden (e.g., nursing home patient, CVA, chronic illness, recovering from surgery)   Negative: HIGH RISK patient (e.g., age > 64 years, diabetes, heart or lung disease, weak immune system)   Negative: Fever present > 3 days (72 hours)   Negative: [1] Fever returns after gone for over 24 hours AND [2] symptoms worse or not improved   Negative: [1] Continuous (nonstop) coughing interferes with work or school AND [2] no improvement using cough treatment per protocol   Negative: Cough present > 3 weeks    Protocols used: CORONAVIRUS (COVID-19) DIAGNOSED OR [id number]

## 2020-04-20 PROBLEM — J90 PLEURAL EFFUSION: Status: ACTIVE | Noted: 2020-01-01

## 2020-04-20 NOTE — H&P
Hospital Medicine H&P    Date of Admit: 4/20/2020  Date of Transfer: 4/20/2020    Subjective:     CC: cough x 2 weeks    History of Present Illness / Brief Hospital Course:  Anson Do is a 74 y.o. male who  has a past medical history of tobacco use, chronic rhinitis, GERD (gastroesophageal reflux disease). The patient presented to the on 4/20/2020 with a primary complaint of Headache; Cough; and Sore Throat    Patient presents to the ED with complaints of cough and sore throat x 2 weeks. Over that time period, he's also had some NINO that has been progressing.   He went to an urgent care where he was started on azithromycin. His symptoms didn't improve after a few days so he went in to see his PCP who ordered COVID testing which has since returned negative. He does endorse occasional mild headaches. He endorses some sharp discomfort intermittently on the R side of his chest but says it is rare and only lasts a few seconds at a time. He came in to the ED today because his cough and SOB have continued to get worse and he is concerned that there is something other than URI or viral infection going on.   Denies cp, n/v/d, abd pain, hemoptysis, fevers/chills, vision changes, sick contacts.         Past Medical History:  Past Medical History:   Diagnosis Date    GERD (gastroesophageal reflux disease)        Past Surgical History:  Past Surgical History:   Procedure Laterality Date    BACK SURGERY  1975    hernina repair         Allergies:  Review of patient's allergies indicates:  No Known Allergies    Home Medications:  Prior to Admission medications    Medication Sig Start Date End Date Taking? Authorizing Provider   ammonium lactate 12 % Crea Apply 1 application topically 2 (two) times daily. 4/6/17   Nikita Kerns DPM   COLCHICINE (COLCRYS ORAL) Take by mouth.    Historical Provider, MD   fexofenadine (ALLEGRA) 180 MG tablet Take 180 mg by mouth once daily.    Historical Provider, MD       Family  "History:  Family History   Problem Relation Age of Onset    Allergic rhinitis Neg Hx     Allergies Neg Hx     Angioedema Neg Hx     Asthma Neg Hx     Eczema Neg Hx     Immunodeficiency Neg Hx        Social History:  Social History     Tobacco Use    Smoking status: Former Smoker     Packs/day: 2.00     Last attempt to quit: 1988     Years since quittin.7   Substance Use Topics    Alcohol use: Not on file    Drug use: Not on file       Review of Systems:  Pertinent items are noted in HPI. All other systems are reviewed and are negative.     Objective:   Last 24 Hour Vital Signs:  BP  Min: 116/71  Max: 148/83  Temp  Av.7 °F (36.5 °C)  Min: 97.7 °F (36.5 °C)  Max: 97.7 °F (36.5 °C)  Pulse  Av.2  Min: 52  Max: 82  Resp  Av.2  Min: 17  Max: 21  SpO2  Av.4 %  Min: 91 %  Max: 99 %  Height  Av' 2" (188 cm)  Min: 6' 2" (188 cm)  Max: 6' 2" (188 cm)  Weight  Av.4 kg (250 lb)  Min: 113.4 kg (250 lb)  Max: 113.4 kg (250 lb)  Body mass index is 32.1 kg/m².  No intake/output data recorded.    Physical Examination:  Physical Exam   Constitutional: He is oriented to person, place, and time. He appears well-developed and well-nourished. No distress.   HENT:   Head: Normocephalic and atraumatic.   Eyes: Pupils are equal, round, and reactive to light. EOM are normal.   Neck: Normal range of motion. Neck supple. No JVD present.   Cardiovascular: Normal rate and regular rhythm.   No murmur heard.  Pulmonary/Chest: Effort normal. No respiratory distress. He has wheezes (diffuse, mild wheezes).   Diminished breath sounds in R base   Abdominal: Soft. He exhibits no distension. There is no tenderness.   Musculoskeletal: He exhibits no edema or deformity.   Neurological: He is alert and oriented to person, place, and time. No sensory deficit. He exhibits normal muscle tone.   Skin: Skin is warm and dry. Capillary refill takes less than 2 seconds. No erythema.   Psychiatric: He has a normal mood " and affect. His behavior is normal. Judgment and thought content normal.   Vitals reviewed.        Laboratory:  Most Recent Data:  CBC:   Lab Results   Component Value Date    WBC 8.04 04/20/2020    HGB 13.2 (L) 04/20/2020    HCT 40.4 04/20/2020     04/20/2020    MCV 92 04/20/2020    RDW 12.7 04/20/2020       BMP:   Lab Results   Component Value Date     04/20/2020    K 4.1 04/20/2020     04/20/2020    CO2 29 04/20/2020    BUN 18 04/20/2020    GLU 98 04/20/2020    CALCIUM 9.3 04/20/2020    MG 1.9 04/20/2020     LFTs:   Lab Results   Component Value Date    PROT 7.2 04/20/2020    ALBUMIN 3.8 04/20/2020    BILITOT 0.8 04/20/2020    AST 21 04/20/2020    ALKPHOS 68 04/20/2020    ALT 25 04/20/2020     Coags:   Lab Results   Component Value Date    INR 1.1 04/20/2020     FLP:   Lab Results   Component Value Date    CHOL 202 (H) 05/14/2010    HDL 37 (L) 05/14/2010    LDLCALC 141.6 (H) 05/14/2010    TRIG 117 05/14/2010    CHOLHDL 18.3 (L) 05/14/2010     DM:   Lab Results   Component Value Date    LDLCALC 141.6 (H) 05/14/2010    CREATININE 1.1 04/20/2020     Cardiac:   Lab Results   Component Value Date    TROPONINI <0.030 04/20/2020    BNP 67 04/20/2020       Trended Lab Data:  Recent Labs   Lab 04/20/20  1235   WBC 8.04   HGB 13.2*   HCT 40.4      MCV 92   RDW 12.7      K 4.1      CO2 29   BUN 18   GLU 98   CALCIUM 9.3   PROT 7.2   ALBUMIN 3.8   BILITOT 0.8   AST 21   ALKPHOS 68   ALT 25       Trended Cardiac Data:  Recent Labs   Lab 04/20/20  1235   TROPONINI <0.030   BNP 67       Other Results:  EKG (my interpretation): sinus bradycardia.    Radiology:  Imaging Results          CTA Chest Non-Coronary (PE Study) (Final result)  Result time 04/20/20 14:05:26    Final result by Paul Hunter MD (04/20/20 14:05:26)                 Impression:      1. No evidence of pulmonary embolism to the segmental arterial level. If there is continued clinical concern, consider further  evaluation with lower extremity doppler.    2. Moderate size right pleural effusion and adjacent atelectasis/consolidation.    3.  Nodular density at the right lung apex, possibly representing an additional focus of atelectasis noting that malignancy is not excluded and or a follow-up CT after treatment to document resolution would be warranted.      Electronically signed by: Paul Hunter MD  Date:    04/20/2020  Time:    14:05             Narrative:      CMS MANDATED QUALITY DATA - CT RADIATION - 436    All CT scans at this facility utilize dose modulation, iterative reconstruction, and/or weight based dosing when appropriate to reduce radiation dose to as low as reasonably achievable.    CLINICAL HISTORY:  ([id number])75 y/o  (1945) M    Chest pain, acute, PE suspected, intermed prob, negative D-dimer;    TECHNIQUE:  ([id number], exam time 4/20/2020 13:58)    CTA CHEST NON CORONARY [id number]    Axial CT examination of the chest with attention to the pulmonary arteries was performed using contiguous axial images from the diaphragms to the lung apices following the intravenous administration of non-ionic contrast material. Images were reviewed using lung, mediastinal, and bone windows. The study was performed with thin sections with subsequent 3-D reformats/MIP/reconstructions in multiple planes.    COMPARISON:  Radiograph of the chest from 04/20/2020    FINDINGS:  CTA PE evaluation: This is a moderate to high quality study for the evaluation of pulmonary embolism . The pulmonary arteries are normal in appearance without pulmonary emboli noted up to the segmental level, noting the limitations of CT technique for identifying small or isolated subsegmental emboli.    CT Chest:    Visualized neck: Within normal limits.    Lungs: Moderate-sized right pleural effusion and adjacent atelectasis/consolidation.  The left lung is essentially clear.  There is mild biapical emphysematous lung disease.  There is a 15 x 14 mm  nodular density in the right upper lobe (image 77).  There is a similar 7 mm diameter nodular density in the base of the right upper lobe (image 139)    Airway: The trachea and central bronchial tree appear normal.    Pleura: There is no pleural effusion. There is no pneumothorax.    Cardiovascular: The heart is normal in size.  There are no findings of right heart failure. The pulmonary artery is normal in size. There are scattered coronary arterial calcifications (three-vessel disease).  There are scattered atheromatous calcifications at the aortic arch.    Mediastinum: There is no supraclavicular  axillary  mediastinal  or hilar lymphadenopathy.    Soft tissues: The peripheral soft tissues appear normal.    Musculoskeletal: There are mild degenerative changes of the thoracic spine.  There are no suspicious osseous lesions.    Esophagus: normal    Upper Abdomen: visualized                               CT Head Without Contrast (Final result)  Result time 04/20/20 13:57:12    Final result by Paul Hunter MD (04/20/20 13:57:12)                 Impression:      No acute intracranial process      Electronically signed by: Paul Hunter MD  Date:    04/20/2020  Time:    13:57             Narrative:    CLINICAL HISTORY:  ([id number])75 y/o  (1945) M    Headache, acute, norm neuro exam;    TECHNIQUE:  ([id number], exam time 4/20/2020 13:54)    CT HEAD WITHOUT CONTRAST [id number]    Axial CT of the brain without contrast using soft tissue and bone algorithm. Please note in the acute setting if there is a clinical concern for an acute stroke MRI would be more sensitive/specific for evaluation of ischemia.    CMS MANDATED QUALITY DATA - CT RADIATION - 436    All CT scans at this facility utilize dose modulation, iterative reconstruction, and/or weight based dosing when appropriate to reduce radiation dose to as low as reasonably achievable.    COMPARISON:  None available.    FINDINGS:  No acute intracranial  hemorrhage, edema or mass effect, and no acute parenchymal abnormality. There is no hydrocephalus, herniation or midline shift, and the basal and suprasellar cisterns are within normal limits. The osseous structures show no acute skull fracture. The ventricles and sulci are normal. There is normal gray white differentiation. Orbital contents appear within normal limits noting right lateral lens replacement external auditory canals are unremarkable.  The visualized paranasal sinuses show scattered areas of mucosal thickening in the maxillary sinuses with no air-fluid level or osseous erosion.  The mastoid air cells are clear.                               X-ray Chest AP Portable (Final result)  Result time 04/20/20 13:01:51    Final result by Paul Hunter MD (04/20/20 13:01:51)                 Impression:      Faint airspace opacity at the right lung base consistent with a combination of atelectasis and likely infection/pneumonia given the clinical history. Consider radiographic follow-up in 6-8 weeks after treatment to document resolution (as radiographic findings may persist beyond clinical symptoms and underlying malignancy is not excluded).      Electronically signed by: Paul Hunter MD  Date:    04/20/2020  Time:    13:01             Narrative:    CLINICAL HISTORY:  ([id number])75 y/o  (1945) M    Cough, sore throat, chest pain, headache.    TECHNIQUE:  (A#[id number], exam time 4/20/2020 13:00)    XR CHEST AP PORTABLE [id number]    COMPARISON:  Most recent from 11/03/2007.    FINDINGS:  Faint airspace opacity at the right lung base.  There is blunting of the right costophrenic angle consistent with trace pleural effusion. No pneumothorax is identified. The heart is normal in size. The mediastinum is within normal limits. Osseous structures show degenerative disc disease and degenerative changes in the shoulders. The visualized upper abdomen is unremarkable.                                  Assessment/Plan:     Anson Do is a 74 y.o. male with:    R pleural effusion   Possible PNA, URI  - concerned for possible underlying malignancy given smoking hx and ongoing sx despite abx therapy, BNP wnl   - continue IV abx and f/u cultures   - check procalcitonin, d/c abx if negative   - discussed case with pulmonary, plan for thoracentesis w/ IR for further evaluation           Diaz Gonzalez

## 2020-04-20 NOTE — ED PROVIDER NOTES
Encounter Date: 2020       History     Chief Complaint   Patient presents with    Headache    Cough    Sore Throat     Patient presents complaining of cough, sore throat, shortness breath.  Patient was prescribed azithromycin had a negative COVID test recently in clinic.  Patient soft primary care doctor again this morning who advised ER evaluation.  Patient states that when he lays on his right side he feels discomfort and shortness of breath.  He also complains of occipital headache which he describes as mild.  No fever.  At the worst symptoms are moderate.  He has not had this before.  He currently is on azithromycin.  Patient denies any one-sided numbness tingling or weakness no blurry vision.        Review of patient's allergies indicates:  No Known Allergies  Past Medical History:   Diagnosis Date    GERD (gastroesophageal reflux disease)      Past Surgical History:   Procedure Laterality Date    BACK SURGERY      hernina repair       Family History   Problem Relation Age of Onset    Allergic rhinitis Neg Hx     Allergies Neg Hx     Angioedema Neg Hx     Asthma Neg Hx     Eczema Neg Hx     Immunodeficiency Neg Hx      Social History     Tobacco Use    Smoking status: Former Smoker     Packs/day: 2.00     Last attempt to quit: 1988     Years since quittin.7   Substance Use Topics    Alcohol use: Not on file    Drug use: Not on file     Review of Systems   Respiratory: Positive for cough and shortness of breath.    Neurological: Positive for headaches.   All other systems reviewed and are negative.      Physical Exam     Initial Vitals [20 1209]   BP Pulse Resp Temp SpO2   (!) 148/83 62 18 97.7 °F (36.5 °C) 99 %      MAP       --         Physical Exam    Nursing note and vitals reviewed.  Constitutional: He appears well-developed and well-nourished. He is not diaphoretic. No distress.   HENT:   Head: Normocephalic and atraumatic.   Mouth/Throat: Oropharynx is clear and  moist.   Eyes: EOM are normal.   Neck: Normal range of motion. Neck supple.   Cardiovascular: Normal rate, regular rhythm, normal heart sounds and intact distal pulses.   Pulmonary/Chest: Breath sounds normal. No respiratory distress.   Abdominal: Soft.   Musculoskeletal: Normal range of motion. He exhibits no edema.   Neurological: He is alert and oriented to person, place, and time.   Skin: Skin is warm and dry.   Psychiatric: He has a normal mood and affect. His behavior is normal. Judgment and thought content normal.         ED Course   Procedures  Labs Reviewed   CBC W/ AUTO DIFFERENTIAL - Abnormal; Notable for the following components:       Result Value    RBC 4.39 (*)     Hemoglobin 13.2 (*)     MPV 8.9 (*)     All other components within normal limits   CULTURE, BLOOD   CULTURE, BLOOD   COMPREHENSIVE METABOLIC PANEL   MAGNESIUM   TROPONIN I   PROTIME-INR   SARS-COV-2 RNA AMPLIFICATION, QUAL   B-TYPE NATRIURETIC PEPTIDE   LACTIC ACID, PLASMA   PROCALCITONIN   B-TYPE NATRIURETIC PEPTIDE     EKG Readings: (Independently Interpreted)   EKG is sinus Aleks of 53     ECG Results          EKG 12-LEAD (In process)  Result time 04/20/20 13:01:25    In process by Interface, [facility] (04/20/20 13:01:25)                 Narrative:    Test Reason : R07.9,    Vent. Rate : 053 BPM     Atrial Rate : 053 BPM     P-R Int : 000 ms          QRS Dur : 084 ms      QT Int : 408 ms       P-R-T Axes : 000 012 031 degrees     QTc Int : 382 ms    Junctional rhythm  Abnormal ECG  No previous ECGs available    Referred By: [email or web address]   SELF           Confirmed By:                             Imaging Results          CTA Chest Non-Coronary (PE Study) (Final result)  Result time 04/20/20 14:05:26    Final result by Paul Hunter MD (04/20/20 14:05:26)                 Impression:      1. No evidence of pulmonary embolism to the segmental arterial level. If there is continued clinical concern, consider further evaluation with  lower extremity doppler.    2. Moderate size right pleural effusion and adjacent atelectasis/consolidation.    3.  Nodular density at the right lung apex, possibly representing an additional focus of atelectasis noting that malignancy is not excluded and or a follow-up CT after treatment to document resolution would be warranted.      Electronically signed by: Paul Hunter MD  Date:    04/20/2020  Time:    14:05             Narrative:      CMS MANDATED QUALITY DATA - CT RADIATION - 436    All CT scans at this facility utilize dose modulation, iterative reconstruction, and/or weight based dosing when appropriate to reduce radiation dose to as low as reasonably achievable.    CLINICAL HISTORY:  ([id number])73 y/o  (1945) M    Chest pain, acute, PE suspected, intermed prob, negative D-dimer;    TECHNIQUE:  (A#[id number], exam time 4/20/2020 13:58)    CTA CHEST NON CORONARY [id number]    Axial CT examination of the chest with attention to the pulmonary arteries was performed using contiguous axial images from the diaphragms to the lung apices following the intravenous administration of non-ionic contrast material. Images were reviewed using lung, mediastinal, and bone windows. The study was performed with thin sections with subsequent 3-D reformats/MIP/reconstructions in multiple planes.    COMPARISON:  Radiograph of the chest from 04/20/2020    FINDINGS:  CTA PE evaluation: This is a moderate to high quality study for the evaluation of pulmonary embolism . The pulmonary arteries are normal in appearance without pulmonary emboli noted up to the segmental level, noting the limitations of CT technique for identifying small or isolated subsegmental emboli.    CT Chest:    Visualized neck: Within normal limits.    Lungs: Moderate-sized right pleural effusion and adjacent atelectasis/consolidation.  The left lung is essentially clear.  There is mild biapical emphysematous lung disease.  There is a 15 x 14 mm nodular density  in the right upper lobe (image 77).  There is a similar 7 mm diameter nodular density in the base of the right upper lobe (image 139)    Airway: The trachea and central bronchial tree appear normal.    Pleura: There is no pleural effusion. There is no pneumothorax.    Cardiovascular: The heart is normal in size.  There are no findings of right heart failure. The pulmonary artery is normal in size. There are scattered coronary arterial calcifications (three-vessel disease).  There are scattered atheromatous calcifications at the aortic arch.    Mediastinum: There is no supraclavicular  axillary  mediastinal  or hilar lymphadenopathy.    Soft tissues: The peripheral soft tissues appear normal.    Musculoskeletal: There are mild degenerative changes of the thoracic spine.  There are no suspicious osseous lesions.    Esophagus: normal    Upper Abdomen: visualized                               CT Head Without Contrast (Final result)  Result time 04/20/20 13:57:12    Final result by Paul Hunter MD (04/20/20 13:57:12)                 Impression:      No acute intracranial process      Electronically signed by: Paul Hunter MD  Date:    04/20/2020  Time:    13:57             Narrative:    CLINICAL HISTORY:  ([id number])75 y/o  (1945) M    Headache, acute, norm neuro exam;    TECHNIQUE:  ([id number], exam time 4/20/2020 13:54)    CT HEAD WITHOUT CONTRAST [id number]    Axial CT of the brain without contrast using soft tissue and bone algorithm. Please note in the acute setting if there is a clinical concern for an acute stroke MRI would be more sensitive/specific for evaluation of ischemia.    CMS MANDATED QUALITY DATA - CT RADIATION - 436    All CT scans at this facility utilize dose modulation, iterative reconstruction, and/or weight based dosing when appropriate to reduce radiation dose to as low as reasonably achievable.    COMPARISON:  None available.    FINDINGS:  No acute intracranial hemorrhage, edema or  mass effect, and no acute parenchymal abnormality. There is no hydrocephalus, herniation or midline shift, and the basal and suprasellar cisterns are within normal limits. The osseous structures show no acute skull fracture. The ventricles and sulci are normal. There is normal gray white differentiation. Orbital contents appear within normal limits noting right lateral lens replacement external auditory canals are unremarkable.  The visualized paranasal sinuses show scattered areas of mucosal thickening in the maxillary sinuses with no air-fluid level or osseous erosion.  The mastoid air cells are clear.                               X-ray Chest AP Portable (Final result)  Result time 04/20/20 13:01:51    Final result by Paul Hunter MD (04/20/20 13:01:51)                 Impression:      Faint airspace opacity at the right lung base consistent with a combination of atelectasis and likely infection/pneumonia given the clinical history. Consider radiographic follow-up in 6-8 weeks after treatment to document resolution (as radiographic findings may persist beyond clinical symptoms and underlying malignancy is not excluded).      Electronically signed by: Paul Hunter MD  Date:    04/20/2020  Time:    13:01             Narrative:    CLINICAL HISTORY:  ([id number])75 y/o  (1945) M    Cough, sore throat, chest pain, headache.    TECHNIQUE:  ([id number], exam time 4/20/2020 13:00)    XR CHEST AP PORTABLE [id number]    COMPARISON:  Most recent from 11/03/2007.    FINDINGS:  Faint airspace opacity at the right lung base.  There is blunting of the right costophrenic angle consistent with trace pleural effusion. No pneumothorax is identified. The heart is normal in size. The mediastinum is within normal limits. Osseous structures show degenerative disc disease and degenerative changes in the shoulders. The visualized upper abdomen is unremarkable.                                 Medical Decision Making:   ED  Management:  Patient has a moderately sized pleural effusion on his CT scan.  Patient having episodes of hypoxia dropping as low as 91% on room air.  Patient also has failed outpatient therapy with azithromycin.  Will start patient on IV Levaquin.  He remains clinically stable.  Patient likely will benefit from Pulmonary consultation.                                 Clinical Impression:       ICD-10-CM ICD-9-CM   1. Pleural effusion J90 511.9   2. Chest pain R07.9 786.50   3. Pneumonia due to infectious organism, unspecified laterality, unspecified part of lung J18.9 136.9     484.8                                Miki Cummins MD  04/20/20 9419

## 2020-04-21 PROBLEM — R91.1 PULMONARY NODULE: Status: ACTIVE | Noted: 2020-01-01

## 2020-04-21 PROBLEM — Z87.891 FORMER SMOKER: Status: ACTIVE | Noted: 2020-01-01

## 2020-04-21 NOTE — DISCHARGE SUMMARY
[facility]  Discharge Summary    DOS: 04/21/2020      Patient Name: Anson Do  MRN: [id number]  Admission Date: 4/20/2020  Hospital Length of Stay: 0 days  Discharge Date and Time:  04/21/2020 3:25 PM  Attending Physician: Opal att. providers found   Discharging Provider: Janny Middleton MD  Primary Care Provider: Felipe Shipley MD      HPI:   Anson Do is a 74 y.o. male who  has a past medical history of tobacco use, chronic rhinitis, GERD (gastroesophageal reflux disease). The patient presented to the on 4/20/2020 with a primary complaint of Headache; Cough; and Sore Throat     Patient presents to the ED with complaints of cough and sore throat x 2 weeks. Over that time period, he's also had some NINO that has been progressing.   He went to an urgent care where he was started on azithromycin. His symptoms didn't improve after a few days so he went in to see his PCP who ordered COVID testing which has since returned negative. He does endorse occasional mild headaches. He endorses some sharp discomfort intermittently on the R side of his chest but says it is rare and only lasts a few seconds at a time. He came in to the ED today because his cough and SOB have continued to get worse and he is concerned that there is something other than URI or viral infection going on.   Denies cp, n/v/d, abd pain, hemoptysis, fevers/chills, vision changes, sick contacts.    * No surgery found *      Hospital Course:   04/21:  Patient was seen and examined bedside.  No new complaints.  Breathing comfortably on room air.  No acute events overnight as per nursing staff.  Waiting for diagnostic and therapeutic thoracentesis.     In summary,   Patient was admitted for cough, progressive shortness of breath.  COVID testing was negative.  He was found to have good size right-sided pleural effusion most likely due to suspected lung malignancy.  He underwent thoracentesis today with 2 L fluid removed.   No immediate postprocedure complications noted and chest x-ray rule out pneumothorax.  Pulmonary was consulted who cleared him for discharge with outpatient follow-up.  Today upon my assessment before and after thoracentesis patient denies any shortness of breath, is breathing comfortably on room air.  He reported subjective improvement in his respiratory status after thoracentesis and also reported mild pleuritic chest pain.  He was advised to follow-up with Dr. Torres to follow up on cytology reports and to further explore etiology of pleural effusion.  He was discharged in hemodynamically stable condition    Review of Systems   Constitutional: Negative for activity change and appetite change.   HENT: Negative for congestion and sore throat.    Eyes: Negative for discharge and visual disturbance.   Respiratory:  Negative for shortness of breath. Negative for cough and chest tightness.    Cardiovascular: Negative for chest pain and leg swelling.   Gastrointestinal: Negative for abdominal pain and nausea.   Genitourinary: Negative for dysuria and hematuria.   Musculoskeletal: Positive for back pain. Negative for myalgias.   Skin: Negative for pallor and rash.   Neurological: Negative for dizziness and weakness.   Psychiatric/Behavioral: Negative for behavioral problems. The patient is not nervous/anxious.    All other systems reviewed and are negative.    Physical Exam   Constitutional: He is oriented to person, place, and time. He appears well-developed and well-nourished.   HENT:   Head: Atraumatic.   Mouth/Throat: Oropharynx is clear and moist.   Eyes: Pupils are equal, round, and reactive to light. EOM are normal.   Neck: Neck supple. No JVD present.   Cardiovascular: Normal rate, regular rhythm and normal heart sounds. Exam reveals no gallop.   No murmur heard.  Pulmonary/Chest: Breath sounds normal. No respiratory distress. He has no wheezes. Improved breath sounds in right lower lung field after  thoracentesis  Abdominal: Soft. Bowel sounds are normal. He exhibits no distension. There is no rebound and no guarding.   Musculoskeletal: He exhibits no edema.   Lymphadenopathy:  He has no cervical adenopathy.   Neurological: He is alert and oriented to person, place, and time. No cranial nerve deficit.   Skin: Skin is warm. Capillary refill takes less than 2 seconds. No rash noted.   Psychiatric: His behavior is normal.     Nursing note and vitals reviewed     Consults:   Consults (From admission, onward)        Status Ordering Provider     Inpatient consult to Pulmonology  Once     Provider:  Shell Cristobal MD    Completed CHACHO العراقي          No new Assessment & Plan notes have been filed under this hospital service since the last note was generated.  Service: Hospital Medicine    Final Active Diagnoses:    Diagnosis Date Noted POA    PRINCIPAL PROBLEM:  Pleural effusion [J90] 04/20/2020 Yes    Former smoker [Z87.891] 04/21/2020 Not Applicable    Pulmonary nodule [R91.1] 04/21/2020 Yes      Problems Resolved During this Admission:       Discharged Condition: good    Disposition: Home or Self Care    Follow Up:  Follow-up Information     Felipe Shipley MD In 1 week.    Specialty:  Internal Medicine  Contact information:  [address] [place] [address] [address]  [place] [address]  [phone]             Shell Cristobal MD In 2 weeks.    Specialties:  Hospitalist, Pulmonary Disease  Contact information:  [address] [place] [address]  [address]  [place] [address]  [phone]                 Patient Instructions:      Ambulatory referral/consult to Pulmonology   Standing Status: Future   Referral Priority: Routine Referral Type: Consultation   Referral Reason: Specialty Services Required   Referred to Provider: SHELL CRISTOBAL Requested Specialty: Pulmonary Disease   Number of Visits Requested: 1     Diet Cardiac     Diet Adult Regular     Notify your health care provider if you experience any of the following:  temperature  >100.4     Notify your health care provider if you experience any of the following:  difficulty breathing or increased cough     Activity as tolerated       Significant Diagnostic Studies: Labs:   BMP:   Recent Labs   Lab 04/20/20  1235 04/21/20  0347   GLU 98 104    139   K 4.1 4.1    104   CO2 29 26   BUN 18 15   CREATININE 1.1 0.9   CALCIUM 9.3 8.9   MG 1.9 1.9       Pending Diagnostic Studies:     Procedure Component Value Units Date/Time    Cytology Specimen-Medical Cytology (Fluid/Wash/Brush) [[id number]] Collected:  04/21/20 1332    Order Status:  Sent Lab Status:  No result     Specimen:  Pleural Fluid     WBC & Diff,Body Fluid Thoracentesis Fluid [[id number]] Collected:  04/21/20 1332    Order Status:  Sent Lab Status:  In process Updated:  04/21/20 1356    Specimen:  Pleural Fluid          Medications:  Reconciled Home Medications:      Medication List      CONTINUE taking these medications    ammonium lactate 12 % Crea  Apply 1 application topically 2 (two) times daily.     COLCRYS ORAL  Take by mouth.     fexofenadine 180 MG tablet  Commonly known as:  ALLEGRA  Take 180 mg by mouth once daily.            Indwelling Lines/Drains at time of discharge:   Lines/Drains/Airways     None                 Time spent on the discharge of patient: 27 minutes  Patient was seen and examined on the date of discharge and determined to be suitable for discharge.         Janny Middleton MD  Department of Hospital Medicine  [facility]

## 2020-04-21 NOTE — HOSPITAL COURSE
04/21:  Patient was seen and examined bedside.  No new complaints.  Breathing comfortably on room air.  No acute events overnight as per nursing staff.  Waiting for diagnostic and therapeutic thoracentesis.     In summary,   Patient was admitted for cough, progressive shortness of breath.  COVID testing was negative.  He was found to have good size right-sided pleural effusion most likely due to suspected lung malignancy.  He underwent thoracentesis today with 2 L fluid removed.  No immediate postprocedure complications noted and chest x-ray rule out pneumothorax.  Pulmonary was consulted who cleared him for discharge with outpatient follow-up.  Today upon my assessment before and after thoracentesis patient denies any shortness of breath, is breathing comfortably on room air.  He reported subjective improvement in his respiratory status after thoracentesis and also reported mild pleuritic chest pain.  He was advised to follow-up with Dr. Torres to follow up on cytology reports and to further explore etiology of pleural effusion.  He was discharged in hemodynamically stable condition    Review of Systems   Constitutional: Negative for activity change and appetite change.   HENT: Negative for congestion and sore throat.    Eyes: Negative for discharge and visual disturbance.   Respiratory:  Negative for shortness of breath. Negative for cough and chest tightness.    Cardiovascular: Negative for chest pain and leg swelling.   Gastrointestinal: Negative for abdominal pain and nausea.   Genitourinary: Negative for dysuria and hematuria.   Musculoskeletal: Positive for back pain. Negative for myalgias.   Skin: Negative for pallor and rash.   Neurological: Negative for dizziness and weakness.   Psychiatric/Behavioral: Negative for behavioral problems. The patient is not nervous/anxious.    All other systems reviewed and are negative.    Physical Exam   Constitutional: He is oriented to person, place, and time. He appears  well-developed and well-nourished.   HENT:   Head: Atraumatic.   Mouth/Throat: Oropharynx is clear and moist.   Eyes: Pupils are equal, round, and reactive to light. EOM are normal.   Neck: Neck supple. No JVD present.   Cardiovascular: Normal rate, regular rhythm and normal heart sounds. Exam reveals no gallop.   No murmur heard.  Pulmonary/Chest: Breath sounds normal. No respiratory distress. He has no wheezes. Improved breath sounds in right lower lung field after thoracentesis  Abdominal: Soft. Bowel sounds are normal. He exhibits no distension. There is no rebound and no guarding.   Musculoskeletal: He exhibits no edema.   Lymphadenopathy:  He has no cervical adenopathy.   Neurological: He is alert and oriented to person, place, and time. No cranial nerve deficit.   Skin: Skin is warm. Capillary refill takes less than 2 seconds. No rash noted.   Psychiatric: His behavior is normal.     Nursing note and vitals reviewed

## 2020-04-21 NOTE — CONSULTS
[facility]  Pulmonology   Consult Note    Inpatient consult to Pulmonology  Consult performed by: Vincent Torres MD  Consult ordered by: Diaz Gonzalez MD  Reason for consult: Pleural effusion  Assessment/Recommendations: New pleural effusion, concerning for MPE:  -  Thoracentesis today with IR.  -  Send pleural fluid for cytology.  -  No compelling indication for antibiotics.          Subjective     Chief Complaint   Patient presents with    Headache    Cough    Sore Throat      History of Present Illness:  Mr. Anson Do is a 74 year old gentleman with a history of GERD and remote smoking history, who was in his USOH until 2-3 weeks ago when he developed a cough and sore throat.  He was seen at an urgent care, where he was diagnosed with bronchitis and given a presciption for antibiotics.  He took the prescription as intstructed but reports that his symptoms only became more severe.  He reports exertional dyspnea that has progressed to being present at rest, pleurisy, non-productive cough, sore throat and headache.  After completing the prescribed antibiotics without any noticeable symptom improvement, he decided to come to the [facility] ED.  In the ED he was found to have a new pleural effusion, hospital medicine was consulted, he was placed in observation and Pulmonology was consulted.  IR has been consulted for a thoracentesis, which is scheduled for today.  When I examined Mr. Do this morning, he reports no significant change in his symptoms since presentation.  He denies any fevers, weight loss, sputum production or hemoptysis.  He has no known history of pulmonary disease or malignancy.      Past Medical History:   Diagnosis Date    GERD (gastroesophageal reflux disease)      Past Surgical History:   Procedure Laterality Date    BACK SURGERY  1975    hernina repair       Family History   Problem Relation Age of Onset    Allergic rhinitis Neg Hx     Allergies Neg Hx     Angioedema Neg  Hx     Asthma Neg Hx     Eczema Neg Hx     Immunodeficiency Neg Hx       Social History     Tobacco Use    Smoking status: Former Smoker     Packs/day: 2.00     Last attempt to quit: 1988     Years since quittin.7   Substance and Sexual Activity    Alcohol use: Not on file    Drug use: Not on file    Sexual activity: Not on file      Allergies:  Patient has no known allergies.     Facility-Administered Medications as of 2020   Medication Route Frequency    acetaminophen tablet 650 mg Oral Q4H PRN    acetaminophen tablet 650 mg Oral Q8H PRN    dextrose 50% injection 12.5 g Intravenous PRN    dextrose 50% injection 25 g Intravenous PRN    glucagon (human recombinant) injection 1 mg Intramuscular PRN    glucose chewable tablet 16 g Oral PRN    glucose chewable tablet 24 g Oral PRN    [COMPLETED] iohexoL (OMNIPAQUE 350) injection 100 mL Intravenous ONCE PRN    [COMPLETED] levoFLOXacin 750 mg/150 mL IVPB 750 mg Intravenous ED 1 Time    ondansetron injection 4 mg Intravenous Q8H PRN    sodium chloride 0.9% flush 10 mL Intravenous PRN     Outpatient Medications as of 2020   Medication    ammonium lactate 12 % Crea    COLCHICINE (COLCRYS ORAL)    fexofenadine (ALLEGRA) 180 MG tablet      Review of Systems   Constitutional: Negative for fever, fatigue, night sweats and weakness.   HENT: Positive for sore throat and congestion. Negative for postnasal drip, rhinorrhea, trouble swallowing and voice change.    Eyes: Negative for redness and itching.   Respiratory: Positive for cough, shortness of breath, pleurisy and dyspnea on extertion. Negative for hemoptysis, sputum production, wheezing and orthopnea.    Cardiovascular: Negative for chest pain, palpitations and leg swelling.   Genitourinary: Negative for difficulty urinating and hematuria.   Endocrine: Negative for polydipsia, polyphagia and polyuria.    Musculoskeletal: Negative for arthralgias, back pain and myalgias.   Skin:  "Negative for rash.   Gastrointestinal: Negative for nausea, vomiting and abdominal pain.   Neurological: Positive for headaches. Negative for syncope and weakness.   Hematological: Negative for adenopathy. Does not bruise/bleed easily and no excessive bruising.   Psychiatric/Behavioral: Negative for confusion and sleep disturbance. The patient is not nervous/anxious.    All other systems reviewed and are negative.     I have personally reviewed the following: active problem list, medication list, allergies, family history, social history, health maintenance, notes from last encounter, lab results, endoscopy procedure notes, imaging and nursing/provider documentation .    Objective     VS: Temp:  [97.8 °F (36.6 °C)-98.2 °F (36.8 °C)]   Pulse:  [52-77]   Resp:  [16-21]   BP: (114-137)/(67-91)   SpO2:  [94 %-96 %]    Estimated body mass index is 33.06 kg/m² as calculated from the following:    Height as of this encounter: 6' 2" (1.88 m).    Weight as of this encounter: 116.8 kg (257 lb 8 oz).   Ideal body weight: 82.2 kg (181 lb 3.5 oz)  Adjusted ideal body weight: 96 kg (211 lb 11.7 oz)   I/O:   Intake/Output Summary (Last 24 hours) at 4/21/2020 1245  Last data filed at 4/21/2020 0831  Gross per 24 hour   Intake 240 ml   Output --   Net 240 ml        Vent: SpO2 95% on room air   PE Physical Exam   Constitutional: He is oriented to person, place, and time. He appears well-developed and well-nourished.  Non-toxic appearance. No distress.   HENT:   Head: Normocephalic and atraumatic.   Nose: Nose normal.   Mouth/Throat: Uvula is midline, oropharynx is clear and moist and mucous membranes are normal. Mallampati Score: II.   Neck: Normal range of motion. Neck supple. No JVD present. No thyromegaly present.   Cardiovascular: Normal rate, regular rhythm, normal heart sounds and intact distal pulses.   No murmur heard.  Pulmonary/Chest: Normal expansion and effort normal. He has decreased breath sounds (over the right base). " He has no wheezes. He has no rhonchi. He has no rales.   Abdominal: Soft. Bowel sounds are normal. He exhibits no distension. There is no hepatosplenomegaly.   Musculoskeletal: Normal range of motion. He exhibits no edema or tenderness.   Lymphadenopathy: No supraclavicular adenopathy is present.     He has no cervical adenopathy.     He has no axillary adenopathy.   Neurological: He is alert and oriented to person, place, and time. No cranial nerve deficit.   Skin: Skin is warm and dry. No rash noted.   Psychiatric: He has a normal mood and affect. His behavior is normal.   Nursing note and vitals reviewed.       Recent Diagnostic Studies:  Labs I have personally reviewed and interpreted all recent labs / diagnostic studies, and noted the following relevant results:  INR:  1.2  AST/ALT:  21/25  Alb:  3.8  BNP:  67  Trop:  <0.030  Lactate:  1.0  PCT:  <0.05  COVID-19 PCR:  Negative    Recent Labs   Lab 04/21/20  0347 04/21/20  0528   WBC 8.18  --    RBC 4.38*  --    HGB 13.1*  --    HCT 39.8*  --      --    MCV 91  --    MCH 29.9  --    MCHC 32.9  --      --    K 4.1  --      --    CO2 26  --    BUN 15  --    CREATININE 0.9  --    MG 1.9  --    INR  --  1.2   APTT  --  30.9      Imaging I have personally reviewed and interpreted all available images and reviewed the associated Radiology reports.  My personal impression of the relevant studies is as follows:  CT: I have reviewed all pertinent results/findings within the past 24 hours and my personal findings are:  No PE. Moderate/free flowing right sided pleural effusion.    CXR: I have reviewed all pertinent results/findings within the past 24 hours and my personal findings are:  opacification of the right lower hemithorax.  I have reviewed all pertinent imaging results/findings within the past 24 hours.  EKG:  I revewed the tracing and my impression is NSR.    Radiology Report:  CTA Chest:  1. No evidence of pulmonary embolism to the segmental  arterial level. If there is continued clinical concern, consider further evaluation with lower extremity doppler.  2. Moderate size right pleural effusion and adjacent atelectasis/consolidation.  3.  Nodular density at the right lung apex, possibly representing an additional focus of atelectasis noting that malignancy is not excluded and or a follow-up CT after treatment to document resolution would be warranted.    CT head:  1. No acute intracranial process   Micro I have personally reviewed and interpreted the available culture data.  Relevant results are as follows.  Blood Culture   Lab Results   Component Value Date    LABBLOO No Growth to date 04/20/2020   Sputum Culture No results found for: GSRESP, RESPIRATORYC and Urine Culture  No results found for: LABURIN     Assessment       Active Hospital Problems    Diagnosis    Former smoker    Pulmonary nodule    Pleural effusion      My Impression:  New right sided pleural effusion with nodular SYLVIA opacity concerning for malignancy with MPE.    Plan     Pulmonary  · Therapeutic thoracentesis today.\  · Send pleural fluid for analysis including cytology.  · No need to remain inpatient afterwards, and can follow up with me as an outpatient in a couple of weeks.  · Agree with discontinuing antibiotics.     Thank you for your consult. I will follow-up with patient. Please contact us if you have any additional questions.    Vincent Torres MD  Pulmonary / Critical Care Medicine  [facility]

## 2020-04-21 NOTE — HPI
Anson Do is a 74 y.o. male who  has a past medical history of tobacco use, chronic rhinitis, GERD (gastroesophageal reflux disease). The patient presented to the on 4/20/2020 with a primary complaint of Headache; Cough; and Sore Throat     Patient presents to the ED with complaints of cough and sore throat x 2 weeks. Over that time period, he's also had some NINO that has been progressing.   He went to an urgent care where he was started on azithromycin. His symptoms didn't improve after a few days so he went in to see his PCP who ordered COVID testing which has since returned negative. He does endorse occasional mild headaches. He endorses some sharp discomfort intermittently on the R side of his chest but says it is rare and only lasts a few seconds at a time. He came in to the ED today because his cough and SOB have continued to get worse and he is concerned that there is something other than URI or viral infection going on.   Denies cp, n/v/d, abd pain, hemoptysis, fevers/chills, vision changes, sick contacts.

## 2020-04-21 NOTE — PLAN OF CARE
2 liters of clear gael pleural fluid drained,and sent for testing as per md order,post procedure cxr performed md reviewed no pneumothorax noted

## 2020-04-21 NOTE — ASSESSMENT & PLAN NOTE
· Therapeutic thoracentesis today.  · Send pleural fluid for analysis including cytology.  · No need to remain inpatient afterwards, and can follow up with me as an outpatient in a couple of weeks.  · Agree with discontinuing antibiotics.

## 2020-04-21 NOTE — HPI
Mr. Anson Do is a 74 year old gentleman with a history of GERD and remote smoking history, who was in his USOH until 2-3 weeks ago when he developed a cough and sore throat.  He was seen at an urgent care, where he was diagnosed with bronchitis and given a presciption for antibiotics.  He took the prescription as intstructed but reports that his symptoms only became more severe.  He reports exertional dyspnea that has progressed to being present at rest, pleurisy, non-productive cough, sore throat and headache.  After completing the prescribed antibiotics without any noticeable symptom improvement, he decided to come to the [facility] ED.  In the ED he was found to have a new pleural effusion, hospital medicine was consulted, he was placed in observation and Pulmonology was consulted.  IR has been consulted for a thoracentesis, which is scheduled for today.  When I examined Mr. Do this morning, he reports no significant change in his symptoms since presentation.  He denies any fevers, weight loss, sputum production or hemoptysis.  He has no known history of pulmonary disease or malignancy.

## 2020-04-21 NOTE — RESPIRATORY THERAPY
04/20/20 2200   Patient Assessment/Suction   Level of Consciousness (AVPU) alert   Respiratory Effort Unlabored   Expansion/Accessory Muscles/Retractions expansion symmetric;no retractions;no use of accessory muscles   All Lung Fields Breath Sounds diminished   Rhythm/Pattern, Respiratory no shortness of breath reported;pattern regular;unlabored   PRE-TX-O2   O2 Device (Oxygen Therapy) nasal cannula   SpO2 95 %   Pulse Oximetry Type Intermittent   $ Pulse Oximetry - Multiple Charge Pulse Oximetry - Multiple   Pulse 72   Resp 18   Respiratory Evaluation   $ Care Plan Tech Time 15 min

## 2020-04-21 NOTE — SUBJECTIVE & OBJECTIVE
Interval History: Patient was seen and examined bedside.  No new complaints.  Breathing comfortably on room air.  No acute events overnight as per nursing staff.  Waiting for diagnostic and therapeutic thoracentesis.     Review of Systems   Constitutional: Negative for activity change and appetite change.   HENT: Negative for congestion and sore throat.    Eyes: Negative for discharge and visual disturbance.   Respiratory: Positive for shortness of breath. Negative for cough and chest tightness.    Cardiovascular: Negative for chest pain and leg swelling.   Gastrointestinal: Negative for abdominal pain and nausea.   Genitourinary: Negative for dysuria and hematuria.   Musculoskeletal: Positive for back pain. Negative for myalgias.   Skin: Negative for pallor and rash.   Neurological: Negative for dizziness and weakness.   Psychiatric/Behavioral: Negative for behavioral problems. The patient is not nervous/anxious.    All other systems reviewed and are negative.    Objective:     Vital Signs (Most Recent):  Temp: 98.2 °F (36.8 °C) (04/21/20 0800)  Pulse: 62 (04/21/20 0830)  Resp: 18 (04/21/20 0830)  BP: 119/67 (04/21/20 0800)  SpO2: 95 % (04/21/20 0830) Vital Signs (24h Range):  Temp:  [97.8 °F (36.6 °C)-98.2 °F (36.8 °C)] 98.2 °F (36.8 °C)  Pulse:  [52-77] 62  Resp:  [16-21] 18  SpO2:  [94 %-96 %] 95 %  BP: (114-137)/(67-91) 119/67     Weight: 116.8 kg (257 lb 8 oz)  Body mass index is 33.06 kg/m².    Intake/Output Summary (Last 24 hours) at 4/21/2020 1231  Last data filed at 4/21/2020 0831  Gross per 24 hour   Intake 240 ml   Output --   Net 240 ml      Physical Exam   Constitutional: He is oriented to person, place, and time. He appears well-developed and well-nourished.   HENT:   Head: Atraumatic.   Mouth/Throat: Oropharynx is clear and moist.   Eyes: Pupils are equal, round, and reactive to light. EOM are normal.   Neck: Neck supple. No JVD present.   Cardiovascular: Normal rate, regular rhythm and normal heart  sounds. Exam reveals no gallop.   No murmur heard.  Pulmonary/Chest: Breath sounds normal. No respiratory distress. He has no wheezes.   Reduced breath sounds in right lower lung field   Abdominal: Soft. Bowel sounds are normal. He exhibits no distension. There is no rebound and no guarding.   Musculoskeletal: He exhibits no edema.   Lymphadenopathy:     He has no cervical adenopathy.   Neurological: He is alert and oriented to person, place, and time. No cranial nerve deficit.   Skin: Skin is warm. Capillary refill takes less than 2 seconds. No rash noted.   Psychiatric: His behavior is normal.   Nursing note and vitals reviewed.      Significant Labs: All pertinent labs within the past 24 hours have been reviewed.    Significant Imaging: I have reviewed all pertinent imaging results/findings within the past 24 hours.

## 2020-04-21 NOTE — ASSESSMENT & PLAN NOTE
Right pleural effusion-likely malignancy  Procalcitonin is negative  Discontinued antibiotics  COVID-19 screening negative  Breathing comfortably on room air  Pulmonary is consulted  Plan is to send in for diagnostic and therapeutic thoracentesis and likely discharge later in the day if no complications from procedure  Will need outpatient follow-up with Pulmonary to follow up with cytology results and future workup

## 2020-04-21 NOTE — PROGRESS NOTES
[facility]  Progress Note    DOS: 04/21/2020    Patient Name: Anosn Do  MRN: [id number]  Patient Class: OP- Observation   Admission Date: 4/20/2020  Length of Stay: 0 days  Attending Physician: Janny Middleton MD  Primary Care Provider: Felipe Shipley MD        Subjective:     Principal Problem:<principal problem not specified>        HPI:  No notes on file    Overview/Hospital Course:  04/21:  Patient was seen and examined bedside.  No new complaints.  Breathing comfortably on room air.  No acute events overnight as per nursing staff.  Waiting for diagnostic and therapeutic thoracentesis.       Interval History: Patient was seen and examined bedside.  No new complaints.  Breathing comfortably on room air.  No acute events overnight as per nursing staff.  Waiting for diagnostic and therapeutic thoracentesis.     Review of Systems   Constitutional: Negative for activity change and appetite change.   HENT: Negative for congestion and sore throat.    Eyes: Negative for discharge and visual disturbance.   Respiratory: Positive for shortness of breath. Negative for cough and chest tightness.    Cardiovascular: Negative for chest pain and leg swelling.   Gastrointestinal: Negative for abdominal pain and nausea.   Genitourinary: Negative for dysuria and hematuria.   Musculoskeletal: Positive for back pain. Negative for myalgias.   Skin: Negative for pallor and rash.   Neurological: Negative for dizziness and weakness.   Psychiatric/Behavioral: Negative for behavioral problems. The patient is not nervous/anxious.    All other systems reviewed and are negative.    Objective:     Vital Signs (Most Recent):  Temp: 98.2 °F (36.8 °C) (04/21/20 0800)  Pulse: 62 (04/21/20 0830)  Resp: 18 (04/21/20 0830)  BP: 119/67 (04/21/20 0800)  SpO2: 95 % (04/21/20 0830) Vital Signs (24h Range):  Temp:  [97.8 °F (36.6 °C)-98.2 °F (36.8 °C)] 98.2 °F (36.8 °C)  Pulse:  [52-77] 62  Resp:  [16-21] 18  SpO2:   [94 %-96 %] 95 %  BP: (114-137)/(67-91) 119/67     Weight: 116.8 kg (257 lb 8 oz)  Body mass index is 33.06 kg/m².    Intake/Output Summary (Last 24 hours) at 4/21/2020 1231  Last data filed at 4/21/2020 0831  Gross per 24 hour   Intake 240 ml   Output --   Net 240 ml      Physical Exam   Constitutional: He is oriented to person, place, and time. He appears well-developed and well-nourished.   HENT:   Head: Atraumatic.   Mouth/Throat: Oropharynx is clear and moist.   Eyes: Pupils are equal, round, and reactive to light. EOM are normal.   Neck: Neck supple. No JVD present.   Cardiovascular: Normal rate, regular rhythm and normal heart sounds. Exam reveals no gallop.   No murmur heard.  Pulmonary/Chest: Breath sounds normal. No respiratory distress. He has no wheezes.   Reduced breath sounds in right lower lung field   Abdominal: Soft. Bowel sounds are normal. He exhibits no distension. There is no rebound and no guarding.   Musculoskeletal: He exhibits no edema.   Lymphadenopathy:     He has no cervical adenopathy.   Neurological: He is alert and oriented to person, place, and time. No cranial nerve deficit.   Skin: Skin is warm. Capillary refill takes less than 2 seconds. No rash noted.   Psychiatric: His behavior is normal.   Nursing note and vitals reviewed.      Significant Labs: All pertinent labs within the past 24 hours have been reviewed.    Significant Imaging: I have reviewed all pertinent imaging results/findings within the past 24 hours.      Assessment/Plan:      Pleural effusion  Right pleural effusion-likely malignancy  Procalcitonin is negative  Discontinued antibiotics  COVID-19 screening negative  Breathing comfortably on room air  Pulmonary is consulted  Plan is to send in for diagnostic and therapeutic thoracentesis and likely discharge later in the day if no complications from procedure  Will need outpatient follow-up with Pulmonary to follow up with cytology results and future workup        VTE  Risk Mitigation (From admission, onward)         Ordered     Place sequential compression device  Until discontinued      04/20/20 1454     Place MICHELE hose  Until discontinued      04/20/20 1454     IP VTE HIGH RISK PATIENT  Once      04/20/20 1454                      Janny Middleton MD  Department of Hospital Medicine   [facility]

## 2020-04-23 NOTE — TELEPHONE ENCOUNTER
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  I called and informed Mr. Do.  He voiced his understanding and has no additional questions.  He reports some transient improvement in his dyspnea after the thoracentesis, but his symptoms have since returned.  Will obtain a CXR to evaluate for recurrent MPE.  Also placed a referral to Dr. Donovan and ordered a staging PET/CT.    Vincent Torres MD

## 2020-04-24 NOTE — PHYSICIAN QUERY
PT Name: Anson Do  MR #: [id number]     Physician Query Form - Documentation Clarification      CDS/: Etienne Tejeda               Contact information: [phone]    This form is a permanent document in the medical record.     Query Date: April 24, 2020    By submitting this query, we are merely seeking further clarification of documentation. Please utilize your independent clinical judgment when addressing the question(s) below.    The Medical record reflects the following:    Supporting Clinical Findings     RIGHT PLEURAL FLUID, THORACENTESIS  - POSITIVE FOR ADENOCARCINOMA    Comment:  Please see concurrent cytology case ([id number])  _______________________________________________________________________  DESCRIPTION:  Pleural Fluid Review    GROSS EXAMINATION:  Received one main stained slide labeled Ernestina fld 4/21/20.      Pathologist: Freddy Álvarez MD (Electronic Signature) 04/23/2020 9:26   AM    [facility] * [address]  * [place] [id number]  Technical services performed at: [facility] * [address] * [place] [id number]      End of Report      Specimen Collected: 04/21/20 13:32 Last Resulted: 04/23/20 09:42                                                                                        Dear Dr. Middleton, coding department has requested query.  Please clarify if you agree with the pathology results above, which were resulted after discharge:    - agree  -disagree      Provider Use Only      -Agree                                                                                                                           [  ] Clinically Undetermined

## 2020-04-28 NOTE — PATIENT INSTRUCTIONS
Pleural Effusion     Pleural effusion is fluid buildup between the layers of tissue that line the outside of the lungs.   Your healthcare provider has told you that you have pleural effusion. Pleural effusion means that you have extra fluid between the pleura. This area is called the pleural space. The pleura are 2 layers of thin, smooth tissue that surround the lungs and line the chest. The pleural space usually holds only a small amount of fluid. This fluid lubricates the pleura. But if too much fluid fills the space, it can make it hard or painful to breathe.  There are 2 types of pleural effusion:  · Inflammatory. This is caused by a lung disease like pneumonia or lung cancer, both of which irritates the pleura.  · Noninflammatory. This is caused by abnormal fluid pressures inside the lungs. The pressure can be caused by congestive heart failure (CHF). In CHF, extra fluid collects inside the lung tissues because of a weak heart muscle. This extra fluid then leaks into the pleural space. Other causes of noninflammatory pleural effusions include kidney disease, liver disease, and malnutrition.  What are the symptoms of pleural effusion?  The symptoms of pleural effusion include:  · Sharp pains in the chest, especially when taking a breath, coughing, or sneezing  · Trouble breathing  · Cough  · Fever  What are the causes of pleural effusion?  Common causes include:  · Congestive heart failure  · Cirrhosis of the liver  · Pneumonia  · Viral lung infection  · Cancer  · Blood clot in the lung (pulmonary embolism)  · Heart surgery  Chest infections like pneumonia and heart disease are the most common causes. Less common causes include lung cancer.  How is pleural effusion diagnosed?  Your healthcare provider will examine you and ask about your health history. Tests include:  · Blood tests  · Analysis of fluid in pleural space, chest X-ray, CT scan, or ultrasound  How is pleural effusion treated?  The extra fluid may  be drained from the pleural space. This is done with a procedure called thoracentesis. This procedure uses a thin needle to draw out the fluid from the pleural space. In some cases, a tube is placed in the chest to drain the extra fluid. The tube will likely stay in place for several days.  You may have other treatments, depending on the cause of your pleural effusion. If its because of a bacterial infection, you will be given antibiotics to fight the infection. If its because of a heart condition, you will be given medicines and other treatment for your heart. Your healthcare provider can tell you more about the cause of your pleural effusion and your treatment choices.  What are the long-term concerns?  If untreated, pleural effusion can lead to serious health problems, such as collapsed lung from fluid filling the pleural space.     Call 911  Call 911 if you have:  · Trouble breathing  · Worsening chest pain   When to call your healthcare provider   Call your healthcare provider right away if you have:  · Continued coughing  · Fever  Date Last Reviewed: 12/1/2016  © 6350-5574 [employer]. [address] [place] [address], [place] [address]. All rights reserved. This information is not intended as a substitute for professional medical care. Always follow your healthcare professional's instructions.

## 2020-04-28 NOTE — PROGRESS NOTES
[facility]  Pulmonology  Initial Clinic Visit    Subjective   Reason for Referral:    Anson Do is a 74 y.o. male here for follow-up for recently diagnosed adenocarcinoma of the lung with associated MPE    Chief Complaint   Patient presents with    Lung Cancer     recently diagnosed adenocarcinoma of the lung with associated malignant effusion    Pleural Effusion      Having a lot of dyspnea and a non-productive cough since discharge.  Had some pleurisy which has since improved.  No fevers or sputum production.     Past Medical History:   Diagnosis Date    GERD (gastroesophageal reflux disease)      Past Surgical History:   Procedure Laterality Date    BACK SURGERY      hernina repair       Family History   Problem Relation Age of Onset    Allergic rhinitis Neg Hx     Allergies Neg Hx     Angioedema Neg Hx     Asthma Neg Hx     Eczema Neg Hx     Immunodeficiency Neg Hx       Social History     Tobacco Use    Smoking status: Former Smoker     Packs/day: 2.00     Last attempt to quit: 1988     Years since quittin.7   Substance and Sexual Activity    Alcohol use: Not on file    Drug use: Not on file    Sexual activity: Not on file      Allergies:  Patient has no known allergies.     Outpatient Medications as of 2020   Medication    ammonium lactate 12 % Crea    COLCHICINE (COLCRYS ORAL)    fexofenadine (ALLEGRA) 180 MG tablet    albuterol (PROVENTIL/VENTOLIN HFA) 90 mcg/actuation inhaler     No current facility-administered medications on file as of 2020.       Review of Systems   Constitutional: Negative for fever, chills and night sweats.   HENT: Negative for postnasal drip, rhinorrhea and sinus pressure.    Eyes: Negative for redness and itching.   Respiratory: Positive for choking, shortness of breath, pleurisy and dyspnea on extertion. Negative for hemoptysis, sputum production and orthopnea.    Cardiovascular: Negative for chest pain, palpitations and  leg swelling.   Genitourinary: Negative for difficulty urinating and hematuria.   Endocrine: Negative for polydipsia, polyphagia and polyuria.    Musculoskeletal: Negative for gait problem, joint swelling and myalgias.   Skin: Negative for rash.   Gastrointestinal: Negative for nausea and abdominal pain.   Neurological: Positive for weakness. Negative for syncope and headaches.   Hematological: Negative for adenopathy. Does not bruise/bleed easily and no excessive bruising.   Psychiatric/Behavioral: Negative for confusion and sleep disturbance. The patient is not nervous/anxious.       Previous Reports Reviewed:   ER records, historical medical records, lab reports, nursing home notes, office notes, operative reports, radiology reports, referral letter/letters, x-ray reports and pathology report   The following portions of the patient's history were reviewed and updated as appropriate: allergies, current medications, past family history, past medical history, past social history, past surgical history and problem list.    Objective:      BP (P) 126/78   Pulse 76   Wt 115.2 kg (254 lb)   SpO2 95%   BMI 32.61 kg/m²   Body mass index is 32.61 kg/m².     Physical Exam   Constitutional: He is oriented to person, place, and time. He appears well-developed and well-nourished. No distress.   HENT:   Head: Normocephalic.   Nose: Nose normal.   Mouth/Throat: Oropharynx is clear and moist. Mallampati Score: II.   Neck: Normal range of motion. Neck supple. No JVD present. No tracheal deviation present. No thyromegaly present.   Cardiovascular: Normal rate, regular rhythm and normal heart sounds.   Pulmonary/Chest: No respiratory distress. He has decreased breath sounds (over the right base). He has no rhonchi. He has no rales. Chest wall is dull to percussion (over the right base). Negative for tactile fremitus.   Abdominal: Soft. Bowel sounds are normal. He exhibits no distension. There is no tenderness.   Musculoskeletal:  Normal range of motion. He exhibits no edema, tenderness or deformity.   Lymphadenopathy: No supraclavicular adenopathy is present.     He has no cervical adenopathy.     He has no axillary adenopathy.   Neurological: He is alert and oriented to person, place, and time. No cranial nerve deficit.   Skin: Skin is warm and dry. No cyanosis. Nails show no clubbing.   Psychiatric: He has a normal mood and affect.   Nursing note and vitals reviewed.       Personal Review of Relevant Diagnostic Studies:  I have personally reviewed and interpreted the following labs/studies/images.   CT Chest:  o 4/21/2020:  - 1. No evidence of pulmonary embolism to the segmental arterial level. If there is continued clinical concern, consider further evaluation with lower extremity doppler.  - 2. Moderate size right pleural effusion and adjacent atelectasis/consolidation.  - 3.  Nodular density at the right lung apex, possibly representing an additional focus of atelectasis noting that malignancy is not excluded and or a follow-up CT after treatment to document resolution would be warranted.   CXR:  o 4/23/2020  - Bilateral perihilar and bibasilar atelectasis, with small right and tiny left pleural effusions.   Pleural fluid cytology:  o 4/21/2020:  - Adenocarcinoma.  - The morphologic and immunophenotypic features are not entirely specific   but could suggest an upper gastrointestinal tract origin. The pattern   of staining is less likely to be seen in tumors of lung origin but the   possibility cannot be entirely excluded.    Assessment:       1. Malignant pleural effusion    2. Adenocarcinoma    3. Former smoker    4. Cough    5. Dyspnea, unspecified type        Impression:    Persistent symptoms despite recent thoracentesis either represent recurent effusion or trapped lung.  The degree of parenchymal involvement on the initial CT is diffcult to appreciate due to the amount of pleural fluid present .    Plan:   · CXR today.  · If  pleural fluid has reacumulated, will need a pleur ex catheter.  · If it has not, we can assume the lung is trapped and forgo additional thoracentesis.  · Referred to oncology.  Waiting on appointment.  · PET for staging.    I informed the patient of my working diagnosis, it's etiology, risk factors, expected symptoms, diagnostic work up, treatment options and prognosis., I personally reviewed the results of relevant imaging/labs/studies with the patient, and discussed their clinical significance., I spent >10 minutes counseling the patient about their condition., Plan discussed with the patient, who is in agreement., Opportunity provided for the the patient to voice any additional questions or concerns., All questions were answered to the patient's satisfaction., Educational material provided and Patient given date for next visit.    Follow up in about 1 week (around 5/5/2020).    Orders Placed This Visit:  Orders Placed This Encounter   Procedures    X-Ray Chest PA And Lateral     Standing Status:   Future     Standing Expiration Date:   4/28/2021     Order Specific Question:   Reason for Exam:     Answer:   malignant pleural effusion.     Order Specific Question:   May the Radiologist modify the order per protocol to meet the clinical needs of the patient?     Answer:   Yes       Vincent Torres MD  Pulmonary / Critical Care Medicine  [facility]      Late entry:  CXR reviewed.  Increasing right effusion.  Rapidly recummulating MPE warrants pleurex catheter.  Urgent IR referral placed.  I called and informed Mr. Do of this.    Vinecnt Torres MD  .04/28/2020  4:21 PM

## 2020-04-29 NOTE — TELEPHONE ENCOUNTER
Chacha ext [phone] states today was informed from the cno that the providers have to order the covid 19 testing for the procedures .

## 2020-04-29 NOTE — PROGRESS NOTES
Pt given instructions for pleurx cath placement scheduled on 5/4.  Instructed to have  and to fast after midnight.  Pt verbalized understanding

## 2020-04-29 NOTE — TELEPHONE ENCOUNTER
Radiology called they are scheduled for the pleural cath on Monday with  that was the earliest they could get him in . She needs a COVID 19 testing order in for him to do before visit . She said use [id number] when you order the test

## 2020-05-01 NOTE — ED NOTES
Patient is resting in bed with his eyes closed, chest rise is symmetrical, respirations are regular and unlabored. KISHOR COYLE

## 2020-05-01 NOTE — ED NOTES
Anson Do presents to the ED with c/o fever of 101.0 at home. Associated complaints are SOB and non productive cough. Patient reports recent diagnose of lung cancer. Patient verbalizes that he has fluid on his right lung and is supposed to have a catheter placed so he can drain the fluid himself. Patient reports that he has been tested for covid 19 x2 with both tests being negative. AAOx3. Patient ambulates without difficulty. Mucous membranes are pink and moist. Skin is warm, dry and intact. Diminished breath sounds to right lung, respirations are regular and unlabored. Denies congestion or rhinorrhea. BS active x4, no tenderness with palpation, abd is soft and not distended. Denies any appetite or activity change. S1S2, capillary refill is < 2 seconds. Denies dysuria, difficulty urinating, frequency, numbness, tingling or weakness. NAND VSS  Patient does not use any oxygen at home.

## 2020-05-01 NOTE — ED NOTES
Patient has been updated on plan of care and results. Patient has been updated that [facility] will place his cath in OP on Monday. Dr. Andrews is at the bedside.

## 2020-05-01 NOTE — NURSING
Pt arrived to ultrasound via wheelchair from ED on 2 liters 02- Dr Angel consented patient for ultrasound guided thoracentesis, time out performed. Right sided thoracentesis performed by Dr Angel- 1000 ml fluid drained- pt remained stable for duration of procedure. Post op chest xray performed. Report called to CICI Kerns- pt transported back to room.

## 2020-05-01 NOTE — ED PROVIDER NOTES
Encounter Date: 2020    SCRIBE #1 NOTE: IRod, am scribing for, and in the presence of, Harry Andrews MD.       History     Chief Complaint   Patient presents with    Shortness of Breath       Time seen by provider: 9:55 AM on 2020    Anson Do is a 74 y.o. male with PMHx of GERD, pleural effusion, and adenomacarcinoma of the lung who presents to the ED with an onset of SOB beginning x2 weeks ago. Associated symptoms include cough and a fever of 101°. The patient explains that x2 weeks ago he was seen at [facility] and Dx with PNA. A day later it was determined he also had lung cancer. The patient tested negative for COVID 19 x2 times previously. The patient was told his right lung PNA has filled back after being drained at [facility]. He is scheduled for drainage tube placement in his right lung in x3 days. The patient expresses his frustration about being underinformed about his current state and what is being done currently by [facility]. The patient denies any other symptoms at this time. No other pertinent PSHx.       The history is provided by the patient.     Review of patient's allergies indicates:  No Known Allergies  Past Medical History:   Diagnosis Date    Adenocarcinoma of lung 2020    GERD (gastroesophageal reflux disease)     Pleural effusion on right 2020     Past Surgical History:   Procedure Laterality Date    BACK SURGERY      hernina repair       Family History   Problem Relation Age of Onset    Allergic rhinitis Neg Hx     Allergies Neg Hx     Angioedema Neg Hx     Asthma Neg Hx     Eczema Neg Hx     Immunodeficiency Neg Hx      Social History     Tobacco Use    Smoking status: Former Smoker     Packs/day: 2.00     Last attempt to quit: 1988     Years since quittin.7   Substance Use Topics    Alcohol use: Not on file    Drug use: Not on file     Review of Systems   Constitutional: Positive for fever. Negative for activity change, appetite change, chills and  fatigue.   Eyes: Negative for visual disturbance.   Respiratory: Positive for cough and shortness of breath. Negative for apnea.    Cardiovascular: Negative for chest pain and palpitations.   Gastrointestinal: Negative for abdominal distention and abdominal pain.   Genitourinary: Negative for difficulty urinating.   Musculoskeletal: Negative for neck pain.   Skin: Negative for pallor and rash.   Neurological: Negative for headaches.   Hematological: Does not bruise/bleed easily.   Psychiatric/Behavioral: Negative for agitation.       Physical Exam     Initial Vitals [05/01/20 0938]   BP Pulse Resp Temp SpO2   (!) 167/83 80 18 98.4 °F (36.9 °C) 97 %      MAP       --         Physical Exam    Nursing note and vitals reviewed.  Constitutional: He appears well-developed and well-nourished.   HENT:   Head: Normocephalic and atraumatic.   Eyes: Conjunctivae are normal.   Neck: Normal range of motion. Neck supple.   Cardiovascular: Normal rate, regular rhythm and normal heart sounds. Exam reveals no gallop and no friction rub.    No murmur heard.  Pulmonary/Chest: No respiratory distress. He has decreased breath sounds. He has no wheezes. He has no rhonchi. He has no rales.   Diminished breathe sounds of the right lung.   Abdominal: Soft. He exhibits no distension. There is no tenderness.   Musculoskeletal: Normal range of motion.   Neurological: He is alert and oriented to person, place, and time.   Skin: Skin is warm and dry.   Psychiatric: He has a normal mood and affect.         ED Course   Procedures  Labs Reviewed - No data to display  EKG Readings: (Independently Interpreted)   Initial Reading: No STEMI. Heart Rate: 62.   Normal sinus rhythm at a ventricular rate of 62 bpm with normal axis and normal intervals. No STEMI.         Imaging Results    None          Medical Decision Making:   History:   Old Medical Records: I decided to obtain old medical records.  Independently Interpreted Test(s):   I have ordered and  independently interpreted X-rays - see prior notes.  I have ordered and independently interpreted EKG Reading(s) - see prior notes  Clinical Tests:   Lab Tests: Ordered and Reviewed  Radiological Study: Reviewed and Ordered  Medical Tests: Ordered and Reviewed  ED Management:  74-year-old male with a history of adenocarcinoma of the lung with recurrent malignant pleural effusion presents with shortness of breath.  Pleurocentesis is performed under ultrasound guidance with 1 L of fluid removed and marked improvement in dyspnea.  Repeat chest x-ray fails to demonstrate any evidence of pneumothorax.  He is encouraged to follow up for catheter placement on Monday (05/04/2020) and to return for shortness of breath.       APC / Resident Notes:   I, Dr. Harry Andrews III, personally performed the services described in this documentation. All medical record entries made by the scribe were at my direction and in my presence.  I have reviewed the chart and agree that the record reflects my personal performance and is accurate and complete       Scribe Attestation:   Scribe #1: I performed the above scribed service and the documentation accurately describes the services I performed. I attest to the accuracy of the note.            ED Course as of May 04 1235   Fri May 01, 2020   1715 Impression      No pneumothorax status post right thoracentesis.  Small persistent pleural effusion.      Electronically signed by: Lucien Perales MD  Date: 05/01/2020  Time: 17:09        [BD]      ED Course User Index  [BD] Neo Arango MD                Clinical Impression:       ICD-10-CM ICD-9-CM   1. Malignant pleural effusion J91.0 511.81   2. SOB (shortness of breath) R06.02 786.05                                Harry Andrews III, MD  05/04/20 1236

## 2020-05-04 NOTE — PLAN OF CARE
At BS for teaching of pleurx catheter. Pt and sister given pleurx instructions, verbalized understanding.

## 2020-05-04 NOTE — PLAN OF CARE
Pt sister here.  Radiology nurse here to instruct pt and sister on pleur x catheter and how to use.

## 2020-05-07 NOTE — PROGRESS NOTES
[facility]  Pulmonology  Follow-Up Clinic Visit    Subjective:     Reason for Visit:    Anson Do is a 74 y.o. male followed for right sided malignant pleural effusion.    Chief Complaint   Patient presents with    Pleural Effusion    Lung Cancer      05/07/2020:  A PleurX catheter placed on Monday.  Doing well since then.  Drained approximately 200 cc of pleural fluid today.  Cough is improving.  No new complaints.  As an appoint with Dr. Vilchis next week.  Awaiting insurance approval for his PET/CT.       Review of Systems   Constitutional: Negative for fever, chills and night sweats.   HENT: Negative for postnasal drip, rhinorrhea and sinus pressure.    Eyes: Negative for redness and itching.   Respiratory: Negative for hemoptysis, sputum production, choking, shortness of breath, orthopnea, pleurisy and dyspnea on extertion.    Cardiovascular: Negative for chest pain, palpitations and leg swelling.   Genitourinary: Negative for difficulty urinating and hematuria.   Endocrine: Negative for polydipsia, polyphagia and polyuria.    Musculoskeletal: Negative for gait problem, joint swelling and myalgias.   Skin: Negative for rash.   Gastrointestinal: Negative for nausea and abdominal pain.   Neurological: Negative for syncope, weakness and headaches.   Hematological: Negative for adenopathy. Does not bruise/bleed easily and no excessive bruising.   Psychiatric/Behavioral: Negative for confusion and sleep disturbance. The patient is not nervous/anxious.       Outpatient Medications as of 5/7/2020   Medication    albuterol (PROVENTIL/VENTOLIN HFA) 90 mcg/actuation inhaler    COLCHICINE (COLCRYS ORAL)    fexofenadine (ALLEGRA) 180 MG tablet    ammonium lactate 12 % Crea     No current facility-administered medications on file as of 5/7/2020.       Previous Reports Reviewed:   ER records, historical medical records, lab reports, nursing home notes, office notes, operative reports, radiology  reports, referral letter/letters and x-ray reports   The following portions of the patient's history were reviewed and updated as appropriate: allergies, current medications, past family history, past medical history, past social history, past surgical history and problem list.      Objective:     /80 (BP Location: Left arm, Patient Position: Sitting)   Pulse 69   Temp 97.9 °F (36.6 °C)   Wt 112.5 kg (248 lb)   SpO2 96%   BMI 31.84 kg/m²   Body mass index is 31.84 kg/m².     Physical Exam   Constitutional: He is oriented to person, place, and time. He appears well-developed and well-nourished. No distress.   HENT:   Head: Normocephalic.   Nose: Nose normal.   Mouth/Throat: Oropharynx is clear and moist. Mallampati Score: II.   Neck: Normal range of motion. Neck supple. No JVD present. No tracheal deviation present. No thyromegaly present.   Cardiovascular: Normal rate, regular rhythm and normal heart sounds.   Pulmonary/Chest: Normal expansion, symmetric chest wall expansion and breath sounds normal. He has no rhonchi. He has no rales.   Right-sided PleurX catheter without any surrounding erythema or induration. Negative for tactile fremitus.   Abdominal: Soft. Bowel sounds are normal. He exhibits no distension. There is no tenderness.   Musculoskeletal: Normal range of motion. He exhibits no edema, tenderness or deformity.   Lymphadenopathy: No supraclavicular adenopathy is present.     He has no cervical adenopathy.     He has no axillary adenopathy.   Neurological: He is alert and oriented to person, place, and time. No cranial nerve deficit.   Skin: Skin is warm and dry. No cyanosis. Nails show no clubbing.   Psychiatric: He has a normal mood and affect.   Nursing note and vitals reviewed.       Personal Review of Relevant Diagnostic Studies:  I have personally reviewed and interpreted the following labs/studies/images.  · CT Chest:  ? 4/21/2020:  § 1. No evidence of pulmonary embolism to the  segmental arterial level. If there is continued clinical concern, consider further evaluation with lower extremity doppler.  § 2. Moderate size right pleural effusion and adjacent atelectasis/consolidation.  § 3.  Nodular density at the right lung apex, possibly representing an additional focus of atelectasis noting that malignancy is not excluded and or a follow-up CT after treatment to document resolution would be warranted.  · CXR:  ? 4/23/2020  § Bilateral perihilar and bibasilar atelectasis, with small right and tiny left pleural effusions.  · Pleural fluid cytology:  ? 4/21/2020:  § Adenocarcinoma.  § The morphologic and immunophenotypic features are not entirely specific   but could suggest an upper gastrointestinal tract origin. The pattern   of staining is less likely to be seen in tumors of lung origin but the   possibility cannot be entirely excluded.   CXR:  o 5/1/2020:  No pneumothorax status post right thoracentesis.  Small persistent pleural effusion.      Assessment:     1. Malignant pleural effusion    2. Adenocarcinoma    3. Former smoker    4. Non-small cell cancer of right lung    5. Microcytic anemia      Impression:  Symptomatically improved with placement of right pleural catheter.  In process of terminating exactly what the primary site of his malignant pleural effusion is.      Plan:     · Continue to drain pleural fluid on a as needed basis.  · Will contact manufacture about obtaining more bottles.  · Referred to gastroenterology.  · PET-CT.  · Follow-up with oncology next week.     I informed the patient of my working diagnosis, it's etiology, risk factors, expected symptoms, diagnostic work up, treatment options and prognosis., I personally reviewed the results of relevant imaging/labs/studies with the patient, and discussed their clinical significance., I spent >10 minutes counseling the patient about their condition., Plan discussed with the patient, who is in agreement., Opportunity  provided for the the patient to voice any additional questions or concerns., All questions were answered to the patient's satisfaction., Educational material provided and Patient given date for next visit.    Follow up in about 4 weeks (around 6/4/2020).    Orders Placed This Visit:  Orders Placed This Encounter   Procedures    Ambulatory referral/consult to Gastroenterology     Standing Status:   Future     Standing Expiration Date:   6/7/2021     Referral Priority:   Routine     Referral Type:   Consultation     Referral Reason:   Specialty Services Required     Referred to Provider:   [facility]     Requested Specialty:   Gastroenterology     Number of Visits Requested:   1       Vincent Torres MD  Pulmonary / Critical Care Medicine  [facility]

## 2020-05-07 NOTE — PATIENT INSTRUCTIONS
Pleural Effusion     Pleural effusion is fluid buildup between the layers of tissue that line the outside of the lungs.   Your healthcare provider has told you that you have pleural effusion. Pleural effusion means that you have extra fluid between the pleura. This area is called the pleural space. The pleura are 2 layers of thin, smooth tissue that surround the lungs and line the chest. The pleural space usually holds only a small amount of fluid. This fluid lubricates the pleura. But if too much fluid fills the space, it can make it hard or painful to breathe.  There are 2 types of pleural effusion:  · Inflammatory. This is caused by a lung disease like pneumonia or lung cancer, both of which irritates the pleura.  · Noninflammatory. This is caused by abnormal fluid pressures inside the lungs. The pressure can be caused by congestive heart failure (CHF). In CHF, extra fluid collects inside the lung tissues because of a weak heart muscle. This extra fluid then leaks into the pleural space. Other causes of noninflammatory pleural effusions include kidney disease, liver disease, and malnutrition.  What are the symptoms of pleural effusion?  The symptoms of pleural effusion include:  · Sharp pains in the chest, especially when taking a breath, coughing, or sneezing  · Trouble breathing  · Cough  · Fever  What are the causes of pleural effusion?  Common causes include:  · Congestive heart failure  · Cirrhosis of the liver  · Pneumonia  · Viral lung infection  · Cancer  · Blood clot in the lung (pulmonary embolism)  · Heart surgery  Chest infections like pneumonia and heart disease are the most common causes. Less common causes include lung cancer.  How is pleural effusion diagnosed?  Your healthcare provider will examine you and ask about your health history. Tests include:  · Blood tests  · Analysis of fluid in pleural space, chest X-ray, CT scan, or ultrasound  How is pleural effusion treated?  The extra fluid may  be drained from the pleural space. This is done with a procedure called thoracentesis. This procedure uses a thin needle to draw out the fluid from the pleural space. In some cases, a tube is placed in the chest to drain the extra fluid. The tube will likely stay in place for several days.  You may have other treatments, depending on the cause of your pleural effusion. If its because of a bacterial infection, you will be given antibiotics to fight the infection. If its because of a heart condition, you will be given medicines and other treatment for your heart. Your healthcare provider can tell you more about the cause of your pleural effusion and your treatment choices.  What are the long-term concerns?  If untreated, pleural effusion can lead to serious health problems, such as collapsed lung from fluid filling the pleural space.     Call 911  Call 911 if you have:  · Trouble breathing  · Worsening chest pain   When to call your healthcare provider   Call your healthcare provider right away if you have:  · Continued coughing  · Fever  Date Last Reviewed: 12/1/2016  © 7038-1644 [email or web address]. [address] [place] [address], [place] [address]. All rights reserved. This information is not intended as a substitute for professional medical care. Always follow your healthcare professional's instructions.

## 2020-05-12 PROBLEM — C34.11 MALIGNANT NEOPLASM OF UPPER LOBE OF RIGHT LUNG: Status: ACTIVE | Noted: 2020-01-01

## 2020-05-12 NOTE — TELEPHONE ENCOUNTER
----- Message from Laya Harris, Patient Care Assistant sent at 5/12/2020 11:49 AM CDT -----  Patient Sister (Yamileth) called in stating Dr. Torres has ordered the same labs as  ordered and wanted to know wether she she cancel the appointment with the other lab place or what should she do . She can be reached at [phone]        Spoke to Yamileth. She was asking about labs in the message but after speaking to her it seems Dr. Torres is referring Anson to a gastro doctor in [place]. Yamileth says it is for his anemia and said  that Anson has seen Dr. Nisha Curry before. I told Yamileth to call Dr. Torres's office and find out if Anson can go see [facility] instead of the doctor in [place]. I also told Yamileth that I do not see any labs ordered by Brian but if the labs he orders are the same as the ones [facility] orders, Anson only needs to get them done once.

## 2020-05-12 NOTE — PROGRESS NOTES
INITIAL [facility] HEM/ONC CONSULTATION      Subjective:       Patient ID: Anson Do is a 74 y.o. male.    Chief Complaint: No chief complaint on file.  lung cancer    Mr. Do is a 73yo male who presented with coughing and sob which began approximately one month ago.  He called his PCP 5 days after the initial coughing attack and was sent to the ER.  He presented there and was tested for Covid which was negative but was sent home.  He continued to have symptoms and noticed increasing pain in the right chest wall.  He again went to ER on 2020 with a Dx of pneumonia and underwent thoracentesis of 2 L of fluid.  Path was c/w adenocarcinoma.  This recurred and patient then underwent placement of pleurex cath 2020.      Patient had EGD and Colonoscopy one year ago with Dr. Curry.     Patient quit smoking in  but had approx 35pk-yr use.     Father  of lung cancer, he was a smoker as well.     Patient has no medical problems. Takes no medications.        2020:  Patient presents to [facility] with cough and sob and right pleural effusion.     2020:  Thoracentesis 2L fluid. Path c/w Adenocarcinoma. CTA shows RUL nodular density.     2020:  Right pleurex cath placed.    Past Medical History:   Diagnosis Date    Adenocarcinoma of lung 2020    GERD (gastroesophageal reflux disease)     Pleural effusion on right 2020       Past Surgical History:   Procedure Laterality Date    BACK SURGERY  1975    hernina repair         Social History     Socioeconomic History    Marital status:      Spouse name: Not on file    Number of children: Not on file    Years of education: Not on file    Highest education level: Not on file   Occupational History    Not on file   Social Needs    Financial resource strain: Not on file    Food insecurity:     Worry: Not on file     Inability: Not on file    Transportation needs:     Medical: Not on file     Non-medical: Not on file   Tobacco Use    Smoking  status: Former Smoker     Packs/day: 2.00     Last attempt to quit: 1988     Years since quittin.8   Substance and Sexual Activity    Alcohol use: Never     Frequency: Never    Drug use: Not on file    Sexual activity: Not on file   Lifestyle    Physical activity:     Days per week: Not on file     Minutes per session: Not on file    Stress: Not on file   Relationships    Social connections:     Talks on phone: Not on file     Gets together: Not on file     Attends Mosque service: Not on file     Active member of club or organization: Not on file     Attends meetings of clubs or organizations: Not on file     Relationship status: Not on file   Other Topics Concern    Not on file   Social History Narrative    Not on file       Family History   Problem Relation Age of Onset    Allergic rhinitis Neg Hx     Allergies Neg Hx     Angioedema Neg Hx     Asthma Neg Hx     Eczema Neg Hx     Immunodeficiency Neg Hx        Review of patient's allergies indicates:  No Known Allergies    Current Outpatient Medications:     albuterol (PROVENTIL/VENTOLIN HFA) 90 mcg/actuation inhaler, Inhale 2 puffs into the lungs every 4 to 6 hours as needed for Shortness of Breath. Rescue, Disp: 18 g, Rfl: 3    ammonium lactate 12 % Crea, Apply 1 application topically 2 (two) times daily. (Patient not taking: Reported on 2020), Disp: 140 g, Rfl: 11    COLCHICINE (COLCRYS ORAL), Take by mouth., Disp: , Rfl:     fexofenadine (ALLEGRA) 180 MG tablet, Take 180 mg by mouth once daily., Disp: , Rfl:     All medications and past history have been reviewed.    Review of Systems   Constitutional: Negative for activity change, appetite change, chills, diaphoresis, fatigue, fever and unexpected weight change.   HENT: Negative for congestion, mouth sores and nosebleeds.    Eyes: Negative for visual disturbance.   Respiratory: Positive for cough and shortness of breath. Negative for chest tightness.    Cardiovascular:  Negative for chest pain and leg swelling.   Gastrointestinal: Negative for abdominal pain, diarrhea, nausea and vomiting.   Endocrine: Negative for cold intolerance and heat intolerance.   Genitourinary: Positive for frequency. Negative for difficulty urinating and dysuria.   Musculoskeletal: Negative for back pain.   Skin: Negative for rash.   Neurological: Negative for dizziness, seizures, weakness, light-headedness and headaches.   Hematological: Negative for adenopathy. Does not bruise/bleed easily.   Psychiatric/Behavioral: Negative for agitation and behavioral problems. The patient is not nervous/anxious.        Objective:        There were no vitals taken for this visit.    Physical Exam   Constitutional: He is oriented to person, place, and time. He appears well-developed and well-nourished.   HENT:   Head: Normocephalic and atraumatic.   Right Ear: External ear normal.   Left Ear: External ear normal.   Mouth/Throat: Oropharynx is clear and moist.   Eyes: Pupils are equal, round, and reactive to light. Conjunctivae are normal. No scleral icterus.   Neck: Normal range of motion. Neck supple.   Cardiovascular: Normal rate, regular rhythm and normal heart sounds. Exam reveals no gallop and no friction rub.   No murmur heard.  Pulmonary/Chest: Effort normal and breath sounds normal. No respiratory distress. He has no rales. He exhibits no tenderness.   Abdominal: Soft. Bowel sounds are normal. He exhibits no distension and no mass. There is no hepatosplenomegaly. There is no tenderness. There is no rebound and no guarding.   Musculoskeletal: He exhibits no edema.   Lymphadenopathy:        Head (right side): No tonsillar adenopathy present.        Head (left side): No tonsillar adenopathy present.     He has no cervical adenopathy.     He has no axillary adenopathy.        Right: No supraclavicular adenopathy present.        Left: No supraclavicular adenopathy present.   Neurological: He is alert and oriented to  person, place, and time.   Psychiatric: He has a normal mood and affect. His behavior is normal. Judgment and thought content normal.   Vitals reviewed.        Lab  Recent Results (from the past 336 hour(s))   CBC auto differential    Collection Time: 05/04/20  9:51 AM   Result Value Ref Range    WBC 8.82 3.90 - 12.70 K/uL    Hemoglobin 12.4 (L) 14.0 - 18.0 g/dL    Hematocrit 36.7 (L) 40.0 - 54.0 %    Platelets 350 150 - 350 K/uL   CBC auto differential    Collection Time: 05/01/20 10:31 AM   Result Value Ref Range    WBC 8.39 3.90 - 12.70 K/uL    Hemoglobin 12.0 (L) 14.0 - 18.0 g/dL    Hematocrit 37.0 (L) 40.0 - 54.0 %    Platelets 432 (H) 150 - 350 K/uL     CMP  Sodium   Date Value Ref Range Status   05/01/2020 139 136 - 145 mmol/L Final     Potassium   Date Value Ref Range Status   05/01/2020 4.2 3.5 - 5.1 mmol/L Final     Chloride   Date Value Ref Range Status   05/01/2020 102 95 - 110 mmol/L Final     CO2   Date Value Ref Range Status   05/01/2020 29 23 - 29 mmol/L Final     Glucose   Date Value Ref Range Status   05/01/2020 107 70 - 110 mg/dL Final     BUN, Bld   Date Value Ref Range Status   05/01/2020 14 8 - 23 mg/dL Final     Creatinine   Date Value Ref Range Status   05/01/2020 1.0 0.5 - 1.4 mg/dL Final     Calcium   Date Value Ref Range Status   05/01/2020 9.2 8.7 - 10.5 mg/dL Final     Total Protein   Date Value Ref Range Status   04/20/2020 7.2 6.0 - 8.4 g/dL Final     Albumin   Date Value Ref Range Status   04/20/2020 3.8 3.5 - 5.2 g/dL Final     Total Bilirubin   Date Value Ref Range Status   04/20/2020 0.8 0.1 - 1.0 mg/dL Final     Comment:     For infants and newborns, interpretation of results should be based  on gestational age, weight and in agreement with clinical  observations.  Premature Infant recommended reference ranges:  Up to 24 hours.............<8.0 mg/dL  Up to 48 hours............<12.0 mg/dL  3-5 days..................<15.0 mg/dL  6-29 days.................<15.0 mg/dL       Alkaline  Phosphatase   Date Value Ref Range Status   04/20/2020 68 55 - 135 U/L Final     AST   Date Value Ref Range Status   04/20/2020 21 10 - 40 U/L Final     ALT   Date Value Ref Range Status   04/20/2020 25 10 - 44 U/L Final     Anion Gap   Date Value Ref Range Status   05/01/2020 8 8 - 16 mmol/L Final     eGFR if    Date Value Ref Range Status   05/01/2020 >60 >60 mL/min/1.73 m^2 Final     eGFR if non    Date Value Ref Range Status   05/01/2020 >60 >60 mL/min/1.73 m^2 Final     Comment:     Calculation used to obtain the estimated glomerular filtration  rate (eGFR) is the CKD-EPI equation.            Specimen (12h ago, onward)    None                All lab results and imaging results have been reviewed and discussed with the patient.     Assessment:       1. Malignant neoplasm of upper lobe of right lung    2. Non-small cell cancer of right lung      Problem List Items Addressed This Visit     Malignant neoplasm of upper lobe of right lung     I had a long discussion with Mr. Do about this new and life threatening disease.  He is in need of PET CT to properly evaluate this disease but if this is indeed lung cancer then he is stage M1a which is non-operable.  I discussed this with him today and that the best approach for survival at this time would be triple therapy with platinum/Pemetrex/pembroliz.  I reviewed the risks and benefits in great detail but want to wait until the full scanning is in to begin therapy.  Can have port placed in the meantime and will work through the insurance approval process.           Relevant Orders    CBC auto differential    Comprehensive metabolic panel    Ambulatory referral/consult to General Surgery      Other Visit Diagnoses     Non-small cell cancer of right lung            Cancer Staging  No matching staging information was found for the patient.      Plan:         Follow up in about 3 weeks (around 6/2/2020).       The plan was discussed with  the patient and all questions/concerns have been answered to the patient's satisfaction.

## 2020-05-12 NOTE — ASSESSMENT & PLAN NOTE
I had a long discussion with Mr. Do about this new and life threatening disease.  He is in need of PET CT to properly evaluate this disease but if this is indeed lung cancer then he is stage M1a which is non-operable.  I discussed this with him today and that the best approach for survival at this time would be triple therapy with platinum/Pemetrex/pembroliz.  I reviewed the risks and benefits in great detail but want to wait until the full scanning is in to begin therapy.  Can have port placed in the meantime and will work through the insurance approval process.

## 2020-05-13 NOTE — TELEPHONE ENCOUNTER
----- Message from Vincent Torres MD sent at 5/12/2020  3:56 PM CDT -----  Contact: Wife, [phone]  He has a malignant pleural effusion with possible GI source and a history of microcytic anemia.  I think he needs another endoscopy.      ----- Message -----  From: Yareli Scruggs MA  Sent: 5/12/2020   2:54 PM CDT  To: Vincent Torres MD     What is the reason for him seeing a gastroenterologist   ----- Message -----  From: Jenniffer Dahl  Sent: 5/12/2020   1:17 PM CDT  To: Yareli Scruggs MA    The patient's wife called to ask what is the reason the patient needs to be seen by a Gastroenterologist?  Please call her back and assist. Thanks in advance.

## 2020-05-13 NOTE — TELEPHONE ENCOUNTER
Patient wanst to know if we can change his referral from the [facility] to Dr.Veronica Barba she is in [place] an he has already seen her before . Please change referral

## 2020-05-14 NOTE — PROGRESS NOTES
Follow up called to Anson Do regarding their procedure performed at [facility] on 5/4/2020.  Post-Procedure screening of Covid-19 symptoms are as follows:    1. Do you have symptoms?  Yes  2. If yes what are your symptoms?  Shortness of Breath.  Pt reports mild SOB. Pt states symptoms have improved since Pleurx cath placed on 5/4/2020.  Pt is pleased with improvement.    3. Have you tested positive for COVID-19 since your procedure?  No      Please continue Post-Procedure plan and follow up as outlined in your discharge instructions from your Provider. Please contact their office with any questions or concerns for further instructions.        If you are exhibiting any COVID-19 symptoms, such as Fever, Cough or Shortness of Breath, please contact your Primary Care Provider as soon as possible for further evaluation and treatment.

## 2020-05-15 NOTE — LETTER
May 15, 2020      Blake Donovan MD  [address] [facility]  [address]  [place] [address]           [facility]  [address]  [place] [address]  Phone: [phone]  Fax: [phone]          Patient: Anson Do   MR Number: [id number]   YOB: 1945   Date of Visit: 5/15/2020       Dear Dr. Blake Donovan:    Thank you for referring Anson Do to me for evaluation. Attached you will find relevant portions of my assessment and plan of care.    If you have questions, please do not hesitate to call me. I look forward to following Anson Do along with you.    Sincerely,    Yoli Rivas MD    Enclosure  CC:  No Recipients    If you would like to receive this communication electronically, please contact [email or web address] or [phone] to request more information on [email or web address] Link access.    For providers and/or their staff who would like to refer a patient to [facility], please contact us through our one-stop-shop provider referral line, [facility], at [phone].    If you feel you have received this communication in error or would no longer like to receive these types of communications, please e-mail [email or web address]

## 2020-05-15 NOTE — PROGRESS NOTES
Subjective:       Patient ID: Anson Do is a 74 y.o. male.    Chief Complaint: Consult (Referred by Dr. Donovan to eval port placement )      HPI:  Patient reports acute onset of shortness of breath and feeling unwell about 3 weeks ago.  He reported this to his PCP who ordered COVID testing which was negative.  He then had further evaluation which showed a right pleural effusion.  Patient underwent thoracentesis and cytology showed adenocarcinoma.  A pleural catheter was placed by Dr. Torres.  The patient drains the effusion every couple of days for symptomatic relief.  Patient is referred for Port-A-Cath placement in preparation for chemotherapy, anticipated to begin later this month.        Past Medical History:   Diagnosis Date    Adenocarcinoma of lung 04/2020    GERD (gastroesophageal reflux disease)     Pleural effusion on right 04/2020     Past Surgical History:   Procedure Laterality Date    BACK SURGERY  1975    hernina repair       Review of patient's allergies indicates:  No Known Allergies  Medication List with Changes/Refills   Current Medications    ALBUTEROL (PROVENTIL/VENTOLIN HFA) 90 MCG/ACTUATION INHALER    Inhale 2 puffs into the lungs every 4 to 6 hours as needed for Shortness of Breath. Rescue    AMMONIUM LACTATE 12 % CREA    Apply 1 application topically 2 (two) times daily.    COLCHICINE (COLCRYS ORAL)    Take by mouth.    FEXOFENADINE (ALLEGRA) 180 MG TABLET    Take 180 mg by mouth once daily.    FOLIC ACID (FOLVITE) 1 MG TABLET    Take 1 tablet (1 mg total) by mouth once daily.    NAPROXEN SODIUM (ANAPROX) 220 MG TABLET    Take 220 mg by mouth once daily.    ONDANSETRON (ZOFRAN) 8 MG TABLET    Take 1 tablet (8 mg total) by mouth every 8 (eight) hours as needed for Nausea.    PROMETHAZINE (PHENERGAN) 25 MG TABLET    Take 1 tablet (25 mg total) by mouth every 4 to 6 hours as needed.     Family History   Problem Relation Age of Onset    Allergic rhinitis Neg Hx     Allergies Neg Hx      Angioedema Neg Hx     Asthma Neg Hx     Eczema Neg Hx     Immunodeficiency Neg Hx      Social History     Socioeconomic History    Marital status:      Spouse name: Not on file    Number of children: Not on file    Years of education: Not on file    Highest education level: Not on file   Occupational History    Not on file   Social Needs    Financial resource strain: Not on file    Food insecurity:     Worry: Not on file     Inability: Not on file    Transportation needs:     Medical: Not on file     Non-medical: Not on file   Tobacco Use    Smoking status: Former Smoker     Packs/day: 2.00     Last attempt to quit: 1988     Years since quittin.8   Substance and Sexual Activity    Alcohol use: Never     Frequency: Never    Drug use: Not on file    Sexual activity: Not on file   Lifestyle    Physical activity:     Days per week: Not on file     Minutes per session: Not on file    Stress: Not on file   Relationships    Social connections:     Talks on phone: Not on file     Gets together: Not on file     Attends Restorationism service: Not on file     Active member of club or organization: Not on file     Attends meetings of clubs or organizations: Not on file     Relationship status: Not on file   Other Topics Concern    Not on file   Social History Narrative    Not on file         Review of Systems   Constitutional: Negative for appetite change, chills, fever and unexpected weight change.   HENT: Negative for hearing loss, rhinorrhea, sore throat and voice change.    Eyes: Negative for photophobia and visual disturbance.   Respiratory: Negative for cough, choking and shortness of breath.    Cardiovascular: Negative for chest pain, palpitations and leg swelling.   Gastrointestinal: Negative for abdominal pain, blood in stool, constipation, diarrhea, nausea and vomiting.   Endocrine: Negative for cold intolerance, heat intolerance, polydipsia and polyuria.   Musculoskeletal: Negative  for arthralgias, back pain, joint swelling and neck stiffness.   Skin: Negative for color change, pallor and rash.   Neurological: Negative for dizziness, seizures, syncope and headaches.   Hematological: Negative for adenopathy. Does not bruise/bleed easily.   Psychiatric/Behavioral: Negative for agitation, behavioral problems and confusion.       Objective:      Physical Exam   Constitutional: He appears well-developed and well-nourished.  Non-toxic appearance. No distress.   HENT:   Head: Normocephalic and atraumatic. Head is without abrasion and without laceration.   Right Ear: External ear normal.   Left Ear: External ear normal.   Nose: Nose normal.   Mouth/Throat: Oropharynx is clear and moist.   Eyes: Pupils are equal, round, and reactive to light. EOM are normal.   Neck: Trachea normal. No tracheal deviation and normal range of motion present. No thyroid mass and no thyromegaly present.   Cardiovascular: Normal rate and regular rhythm.   Pulmonary/Chest: Effort normal. No accessory muscle usage. No tachypnea. No respiratory distress.   Abdominal: Soft. Normal appearance and bowel sounds are normal. He exhibits no distension and no mass. There is no hepatosplenomegaly. There is no tenderness. There is no tenderness at McBurney's point and negative Zamora's sign. No hernia.   Lymphadenopathy:     He has no cervical adenopathy.     He has no axillary adenopathy.        Right: No inguinal adenopathy present.        Left: No inguinal adenopathy present.   Neurological: He is alert. Coordination and gait normal.   Skin: Skin is warm and intact.   Psychiatric: He has a normal mood and affect. His speech is normal and behavior is normal.       Assessment/Plan:   Malignant neoplasm of upper lobe of right lung  -     Ambulatory referral/consult to General Surgery  -     Basic metabolic panel; Future; Expected date: 05/15/2020  -     CBC auto differential; Future; Expected date: 05/15/2020  -     EKG 12-lead;  Future  -     Case Request Operating Room: [id number]  -     Full code; Standing    Venous insufficiency  -     Basic metabolic panel; Future; Expected date: 05/15/2020  -     CBC auto differential; Future; Expected date: 05/15/2020  -     EKG 12-lead; Future  -     Case Request Operating Room: [id number]  -     Full code; Standing    Encounter for immunological test  -     COVID-19 Routine Screening; Future; Expected date: 05/15/2020        Impression and Treatment Plan: Port placement      I discussed the proposed procedures the the patient including risks, benefits, indications, alternatives and special concerns.  The patient appears to understand and agrees to go ahead with surgery.  I have made no promises, warranties or verbal agreements beyond what was discussed above.      It is my medical opinion this surgery is time sensitive. Further delay of surgery could result in further detriment to the patient.  Discussed the requirement for patient to comply with strict social distancing measures from the time of this evaluation through the day of surgery. Patient understands and agrees to comply with this requirement.          No follow-ups on file.

## 2020-05-15 NOTE — H&P (VIEW-ONLY)
Subjective:       Patient ID: Anson Do is a 74 y.o. male.    Chief Complaint: Consult (Referred by Dr. Donovan to eval port placement )      HPI:  Patient reports acute onset of shortness of breath and feeling unwell about 3 weeks ago.  He reported this to his PCP who ordered COVID testing which was negative.  He then had further evaluation which showed a right pleural effusion.  Patient underwent thoracentesis and cytology showed adenocarcinoma.  A pleural catheter was placed by Dr. Torres.  The patient drains the effusion every couple of days for symptomatic relief.  Patient is referred for Port-A-Cath placement in preparation for chemotherapy, anticipated to begin later this month.        Past Medical History:   Diagnosis Date    Adenocarcinoma of lung 04/2020    GERD (gastroesophageal reflux disease)     Pleural effusion on right 04/2020     Past Surgical History:   Procedure Laterality Date    BACK SURGERY  1975    hernina repair       Review of patient's allergies indicates:  No Known Allergies  Medication List with Changes/Refills   Current Medications    ALBUTEROL (PROVENTIL/VENTOLIN HFA) 90 MCG/ACTUATION INHALER    Inhale 2 puffs into the lungs every 4 to 6 hours as needed for Shortness of Breath. Rescue    AMMONIUM LACTATE 12 % CREA    Apply 1 application topically 2 (two) times daily.    COLCHICINE (COLCRYS ORAL)    Take by mouth.    FEXOFENADINE (ALLEGRA) 180 MG TABLET    Take 180 mg by mouth once daily.    FOLIC ACID (FOLVITE) 1 MG TABLET    Take 1 tablet (1 mg total) by mouth once daily.    NAPROXEN SODIUM (ANAPROX) 220 MG TABLET    Take 220 mg by mouth once daily.    ONDANSETRON (ZOFRAN) 8 MG TABLET    Take 1 tablet (8 mg total) by mouth every 8 (eight) hours as needed for Nausea.    PROMETHAZINE (PHENERGAN) 25 MG TABLET    Take 1 tablet (25 mg total) by mouth every 4 to 6 hours as needed.     Family History   Problem Relation Age of Onset    Allergic rhinitis Neg Hx     Allergies Neg Hx      Angioedema Neg Hx     Asthma Neg Hx     Eczema Neg Hx     Immunodeficiency Neg Hx      Social History     Socioeconomic History    Marital status:      Spouse name: Not on file    Number of children: Not on file    Years of education: Not on file    Highest education level: Not on file   Occupational History    Not on file   Social Needs    Financial resource strain: Not on file    Food insecurity:     Worry: Not on file     Inability: Not on file    Transportation needs:     Medical: Not on file     Non-medical: Not on file   Tobacco Use    Smoking status: Former Smoker     Packs/day: 2.00     Last attempt to quit: 1988     Years since quittin.8   Substance and Sexual Activity    Alcohol use: Never     Frequency: Never    Drug use: Not on file    Sexual activity: Not on file   Lifestyle    Physical activity:     Days per week: Not on file     Minutes per session: Not on file    Stress: Not on file   Relationships    Social connections:     Talks on phone: Not on file     Gets together: Not on file     Attends Jehovah's witness service: Not on file     Active member of club or organization: Not on file     Attends meetings of clubs or organizations: Not on file     Relationship status: Not on file   Other Topics Concern    Not on file   Social History Narrative    Not on file         Review of Systems   Constitutional: Negative for appetite change, chills, fever and unexpected weight change.   HENT: Negative for hearing loss, rhinorrhea, sore throat and voice change.    Eyes: Negative for photophobia and visual disturbance.   Respiratory: Negative for cough, choking and shortness of breath.    Cardiovascular: Negative for chest pain, palpitations and leg swelling.   Gastrointestinal: Negative for abdominal pain, blood in stool, constipation, diarrhea, nausea and vomiting.   Endocrine: Negative for cold intolerance, heat intolerance, polydipsia and polyuria.   Musculoskeletal: Negative  for arthralgias, back pain, joint swelling and neck stiffness.   Skin: Negative for color change, pallor and rash.   Neurological: Negative for dizziness, seizures, syncope and headaches.   Hematological: Negative for adenopathy. Does not bruise/bleed easily.   Psychiatric/Behavioral: Negative for agitation, behavioral problems and confusion.       Objective:      Physical Exam   Constitutional: He appears well-developed and well-nourished.  Non-toxic appearance. No distress.   HENT:   Head: Normocephalic and atraumatic. Head is without abrasion and without laceration.   Right Ear: External ear normal.   Left Ear: External ear normal.   Nose: Nose normal.   Mouth/Throat: Oropharynx is clear and moist.   Eyes: Pupils are equal, round, and reactive to light. EOM are normal.   Neck: Trachea normal. No tracheal deviation and normal range of motion present. No thyroid mass and no thyromegaly present.   Cardiovascular: Normal rate and regular rhythm.   Pulmonary/Chest: Effort normal. No accessory muscle usage. No tachypnea. No respiratory distress.   Abdominal: Soft. Normal appearance and bowel sounds are normal. He exhibits no distension and no mass. There is no hepatosplenomegaly. There is no tenderness. There is no tenderness at McBurney's point and negative Zamora's sign. No hernia.   Lymphadenopathy:     He has no cervical adenopathy.     He has no axillary adenopathy.        Right: No inguinal adenopathy present.        Left: No inguinal adenopathy present.   Neurological: He is alert. Coordination and gait normal.   Skin: Skin is warm and intact.   Psychiatric: He has a normal mood and affect. His speech is normal and behavior is normal.       Assessment/Plan:   Malignant neoplasm of upper lobe of right lung  -     Ambulatory referral/consult to General Surgery  -     Basic metabolic panel; Future; Expected date: 05/15/2020  -     CBC auto differential; Future; Expected date: 05/15/2020  -     EKG 12-lead;  Future  -     Case Request Operating Room: [id number]  -     Full code; Standing    Venous insufficiency  -     Basic metabolic panel; Future; Expected date: 05/15/2020  -     CBC auto differential; Future; Expected date: 05/15/2020  -     EKG 12-lead; Future  -     Case Request Operating Room: [id number]  -     Full code; Standing    Encounter for immunological test  -     COVID-19 Routine Screening; Future; Expected date: 05/15/2020        Impression and Treatment Plan: Port placement      I discussed the proposed procedures the the patient including risks, benefits, indications, alternatives and special concerns.  The patient appears to understand and agrees to go ahead with surgery.  I have made no promises, warranties or verbal agreements beyond what was discussed above.      It is my medical opinion this surgery is time sensitive. Further delay of surgery could result in further detriment to the patient.  Discussed the requirement for patient to comply with strict social distancing measures from the time of this evaluation through the day of surgery. Patient understands and agrees to comply with this requirement.          No follow-ups on file.

## 2020-05-15 NOTE — TELEPHONE ENCOUNTER
Discussed the patient will be getting labs 2 days prior to his treatment start day and on the same day weekly. We will set it up at [facility] once we know when he will be starting chemo.

## 2020-05-15 NOTE — TELEPHONE ENCOUNTER
----- Message from Laya Harris, Patient Care Assistant sent at 5/15/2020  2:11 PM CDT -----  Patient sister (Yamileth) called in stating she need to verify his lab schedule. She can be reached at [phone]

## 2020-05-18 NOTE — DISCHARGE INSTRUCTIONS
To confirm, Your doctor has instructed you that surgery is scheduled for:     Please report to [facility] on [address] the morning of surgery.     Pre-Op will call the afternoon prior to surgery between 4:00 and 6:00 PM with the final arrival time.      PLEASE NOTE:  The surgery schedule has many variables which may affect the time of your surgery case.  Family members should be available if your surgery time changes.  Plan to be here the day of your procedure between 4-6 hours.    MEDICATIONS:  TAKE ONLY THESE MEDICATIONS WITH A SMALL SIP OF WATER THE MORNING OF YOUR PROCEDURE:    Albuterol inhaler      DO NOT TAKE THESE MEDICATIONS 5-7 DAYS PRIOR to your procedure or per your surgeon's request: ASPIRIN, ALEVE, ADVIL, IBUPROFEN, FISH OIL VITAMIN E, HERBALS  (May take Tylenol)    ONLY if you are prescribed any types of blood thinners such as:  Aspirin, Coumadin, Plavix, Pradaxa, Xarelto, Aggrenox, Effient, Eliquis, Savasya, Brilinta, or any other, ask your surgeon whether you should stop taking them and how long before surgery you should stop.  You may also need to verify with the prescribing physician if it is ok to stop your medication.      INSTRUCTIONS IMPORTANT!!  · Do not eat or drink anything between midnight and the time of your procedure- this includes gum, mints, and candy.  · ONLY if you are diabetic, check your sugar in the morning before your procedure.  · Do not smoke, vape or drink alcoholic beverages 24 hours prior to your procedure.  · Shower the night before AND the morning of your procedure with a Chlorhexidine wash such as Hibiclens or Dial antibacterial soap from the neck down.  Do not get it on your face or in your eyes.  You may use your own shampoo and face wash. This helps your skin to be as bacteria free as possible.    · If you wear contact lenses, dentures, hearing aids or glasses, bring a container to put them in during surgery and give to a family member for safe keeping.   Please leave all jewelry, piercing's and valuables at home.   · DO NOT remove hair from the surgery site.  Do not shave the incision site unless you are given specific instructions to do so.    · ONLY if you have been diagnosed with sleep apnea please bring your C-PAP machine.  · ONLY if you wear home oxygen please bring your portable oxygen tank the day of your procedure.   · ONLY for patients requiring bowel prep, written instructions will be given by your doctor's office.  · ONLY if you have a neuro stimulator, please bring the controller with you the morning of surgery  · ONLY if a type and screen test is needed before surgery, please return:  · If your doctor has scheduled you for an overnight stay, bring a small overnight bag with any personal items you need.  · Make arrangements in advance for transportation home by a responsible adult.  · You must make arrangements for transportation, TAXI'S, UBER'S OR LYFTS ARE NOT ALLOWED.          If you have any questions about these instructions, call Pre-Op Admit  Nursing at [phone] or the Pre-Op Day Surgery Unit at [phone].

## 2020-05-20 NOTE — OP NOTE
[facility]  Surgery Department  Operative Note    SUMMARY     Date of Procedure: 5/20/2020     Procedure: Procedure(s) (LRB):  [id number] (N/A)   FLUOROSCOPY    Surgeon(s) and Role:     * Yoli Rivas MD - Primary    Assisting Surgeon: None    Pre-Operative Diagnosis: Malignant neoplasm of upper lobe of right lung [C34.11]  Venous insufficiency [I87.2]    Post-Operative Diagnosis: Post-Op Diagnosis Codes:     * Malignant neoplasm of upper lobe of right lung [C34.11]     * Venous insufficiency [I87.2]    Anesthesia: General    Description of the Procedure:   After informed consent was obtained and placed on the chart, the patient was taken to the operating room and placed supine on the operating room table.  Appropriate intraoperative monitoring devices were placed and general anesthesia induced by anesthesia staff.  The patient was then prepped and draped in the standard surgical fashion.      The right subclavian vein was then accessed without difficulty and the wire advanced into the vein using standard Seldinger technique.  The wire was confirmed in good placement using fluoroscopy.  A #15 blade was then used to make an incision incorporating the wire on the anterior chest.  The incision was carried down through subcutaneous tissues to the level of the fascia using electrocautery.  Subcutaneous pocket was then formed using a combination of blunt dissection and electrocautery.  The dilator and introducer sheath were then introduced over the wire under direct vision using fluoroscopy.  The wire and dilator were then removed, leaving the tear-away sheath.  The catheter was placed through the introducer sheath and the tear-away sheath removed.  The catheter was then cut to the appropriate length using fluoroscopy as a guide.  The catheter was attached to the port and the port placed into the pocket.  Normal saline was used to aspirate and flush the catheter without difficulty.   Heparin flush was then used to lock the port.  The port was sutured into place using 4-0 Prolene.  The incision was closed using 4-0 Monocryl in an interrupted deep dermal fashion and 4-0 Monocryl in a running subcuticular fashion on the skin incision.  The wound was cleaned and dried and benzoin and Steri-strips placed.  Outer dressing was placed.    All sponge, needle, and instrument counts were correct at the end of the case.  The patient tolerated the procedure well, was extubated in the operating room and transported to the recovery room awake and alert, in good condition.  Post operative chext xray will be obtained in recovery.        Complications: No    Estimated Blood Loss (EBL): 4ml           Implants:   Implant Name Type Inv. Item Serial No.  Lot No. LRB No. Used   PORT PLASTIC MRI SINGLE LUM [id number] - [id number]  PORT PLASTIC MRI SINGLE LUM 8FR [id number]  BARD ACCESS [id number] 1       Specimens:   Specimen (12h ago, onward)    None                  Condition: Good    Disposition: PACU - hemodynamically stable.

## 2020-05-20 NOTE — PLAN OF CARE
Arrived to PACU s/p port a cath to right upper chest. Dressing with steri strips, gauze and tegaderm that are D&I.  Denies pain.  Has drain to right lateral chest that has dressing in place that is D&I.  Right lungs diminished with expiratory wheeze to upper and coarse to lower.  Has dry cough.

## 2020-05-20 NOTE — DISCHARGE INSTRUCTIONS
Anesthesia: Before You Receive Anesthesia  You are scheduled for surgery. Youll receive medicine called anesthesia to keep you from feeling pain during the surgery. This sheet explains steps you may need to take to prepare for anesthesia.    Tests  Your healthcare provider may send you to have certain tests before your procedure. These may include:  · Blood tests. These help show how anesthesia may affect you.  · Electrocardiography (ECG or EKG). This helps show how your heart is working.  · Chest X-ray. This image helps show the health of your heart and lungs.  Medicines  In the weeks before your surgery:  · Tell your healthcare provider and anesthesia provider what medicines you take. This includes aspirin, other over-the-counter medicines, herbs, and vitamins. Be sure to mention if you take illegal drugs. (This will be kept confidential.) Giving this information helps to keep you safe.  · You may be told to change certain medicines you take. Or you may be told to stop taking medicines for a certain amount of time.  · Mention how much alcohol you drink and if you smoke. Also mention whether youre allergic to any medicines.  Other preparations  · Follow any directions you are given for not eating or drinking before surgery.  · If you dont talk to your anesthesia provider before surgery, you will meet the day of the procedure. He or she will explain your anesthesia and answer your questions.  · Arrange for an adult family member or friend to drive you home after the surgery.  Be sure to follow all your healthcare providers instructions. If you dont, your procedure may have to be rescheduled.   Date Last Reviewed: 12/1/2016  © 3365-9566 [employer]. [address] [place] [address], [place] [address]. All rights reserved. This information is not intended as a substitute for professional medical care. Always follow your healthcare professional's instructions.

## 2020-05-20 NOTE — ANESTHESIA POSTPROCEDURE EVALUATION
Anesthesia Post Evaluation    Patient: Anson Do    Procedure(s) Performed: Procedure(s) (LRB):  [id number] (N/A)    Final Anesthesia Type: general    Patient location during evaluation: PACU  Patient participation: Yes- Able to Participate  Level of consciousness: awake and alert, oriented and awake  Post-procedure vital signs: reviewed and stable  Pain management: adequate  Airway patency: patent    PONV status at discharge: No PONV  Anesthetic complications: no      Cardiovascular status: blood pressure returned to baseline, hemodynamically stable and stable  Respiratory status: unassisted, spontaneous ventilation and room air  Hydration status: euvolemic  Follow-up not needed.          Vitals Value Taken Time   /71 5/20/2020 11:00 AM   Temp 36.3 °C (97.3 °F) 5/20/2020 10:45 AM   Pulse 70 5/20/2020 11:06 AM   Resp 19 5/20/2020 11:06 AM   SpO2 93 % 5/20/2020 11:06 AM   Vitals shown include unvalidated device data.      No case tracking events are documented in the log.      Pain/Sanya Score: Pain Rating Prior to Med Admin: 0 (5/20/2020  8:36 AM)  Sanya Score: 10 (5/20/2020 10:45 AM)

## 2020-05-20 NOTE — ANESTHESIA PREPROCEDURE EVALUATION
05/20/2020  Anson Do is a 74 y.o., male.  Patient Active Problem List   Diagnosis    Chronic rhinitis    Chronic conjunctivitis of both eyes    Pleural effusion    Former smoker    Pulmonary nodule    Malignant neoplasm of upper lobe of right lung       Past Surgical History:   Procedure Laterality Date    BACK SURGERY  1975    hernina repair      INSERTION OF PLEURAL CATHETER Right 05/2020    pleur X Cath    LUMBAR SPINE SURGERY      L3-l4    THORACENTESIS Right 04/2020        Tobacco Use:  The patient  reports that he quit smoking about 31 years ago. He smoked 2.00 packs per day. He does not have any smokeless tobacco history on file.     Results for orders placed or performed during the hospital encounter of 05/01/20   EKG 12-lead    Collection Time: 05/01/20 10:21 AM    Narrative    Test Reason : R06.02    Vent. Rate : 062 BPM     Atrial Rate : 062 BPM     P-R Int : 190 ms          QRS Dur : 092 ms      QT Int : 394 ms       P-R-T Axes : 074 028 048 degrees     QTc Int : 399 ms    Normal sinus rhythm  Normal ECG  When compared with ECG of 01-MAY-2020 10:13,  Sinus rhythm has replaced Ectopic atrial rhythm  The axis Shifted left  Criteria for Lateral infarct are no longer Present  Criteria for Inferior infarct are no longer Present  T wave inversion no longer evident in Inferior leads  Confirmed by Valentine Luther MD ([id number]) on 5/9/2020 5:02:28 PM    Referred By: [email or web address]   SELF           Confirmed By:Valentine Luther MD             Lab Results   Component Value Date    WBC 9.03 05/18/2020    HGB 10.9 (L) 05/18/2020    HCT 33.7 (L) 05/18/2020    MCV 89 05/18/2020     (H) 05/18/2020     BMP  Lab Results   Component Value Date     05/18/2020    K 4.1 05/18/2020    CL 99 05/18/2020    CO2 28 05/18/2020    BUN 15 05/18/2020    CREATININE 0.9 05/18/2020    CALCIUM 9.1  05/18/2020    ANIONGAP 10 05/18/2020    ESTGFRAFRICA >60.0 05/18/2020    EGFRNONAA >60.0 05/18/2020         Anesthesia Evaluation    I have reviewed the Patient Summary Reports.     I have reviewed the Medications.     Review of Systems  Anesthesia Hx:  No problems with previous Anesthesia  Denies Family Hx of Anesthesia complications.   Denies Personal Hx of Anesthesia complications.   Social:  Former Smoker    Hematology/Oncology:         -- Anemia: Current/Recent Cancer. Oncology Comments: Right upper lobe Neoplasm     EENT/Dental:   chronic allergic rhinitis   Cardiovascular:  Cardiovascular Normal  ECG has been reviewed.    Pulmonary:   Lung Cancer  Right Pleural effusion   Chronic cough    Hepatic/GI:   GERD, well controlled    Musculoskeletal:  Musculoskeletal Normal    Neurological:  Neurology Normal    Endocrine:  Endocrine Normal    Dermatological:  Skin Normal    Psych:  Psychiatric Normal           Physical Exam  General:  Well nourished    Airway/Jaw/Neck:  Airway Findings: Mouth Opening: Normal Tongue: Normal  General Airway Assessment: Adult  Mallampati: III  TM Distance: < 4 cm  Jaw/Neck Findings:  Neck ROM: Normal ROM      Dental:  Dental Findings: Molar caps   Chest/Lungs:  Chest/Lungs Findings: Clear to auscultation, Normal Respiratory Rate     Heart/Vascular:  Heart Findings: Rate: Normal  Rhythm: Regular Rhythm  Sounds: Normal        Mental Status:  Mental Status Findings:  Cooperative, Alert and Oriented         Anesthesia Plan  Type of Anesthesia, risks & benefits discussed:  Anesthesia Type:  general  Patient's Preference: General  Intra-op Monitoring Plan: standard ASA monitors  Intra-op Monitoring Plan Comments:   Post Op Pain Control Plan: multimodal analgesia, IV/PO Opioids PRN and per primary service following discharge from PACU  Post Op Pain Control Plan Comments:   Induction:   IV  Beta Blocker:  Patient is not currently on a Beta-Blocker (No further documentation required).        Informed Consent: Patient understands risks and agrees with Anesthesia plan.  Questions answered.   ASA Score: 3     Day of Surgery Review of History & Physical:        Anesthesia Plan Notes: GETA  Decadron 8mgiv   Zofran 4mg iv  Pre Op Meds: Tylenol 1000mg po        Ready For Surgery From Anesthesia Perspective.

## 2020-05-20 NOTE — INTERVAL H&P NOTE
The patient has been examined and the H&P has been reviewed:    I concur with the findings and no changes have occurred since H&P was written.    Anesthesia/Surgery risks, benefits and alternative options discussed and understood by patient/family.          Active Hospital Problems    Diagnosis  POA    Malignant neoplasm of upper lobe of right lung [C34.11]  Yes     4/20/2020:  Patient presents to [facility] with cough and sob and right pleural effusion.     4/21/2020:  Thoracentesis 2L fluid. Path c/w Adenocarcinoma. CTA shows RUL nodular density.     5/4/2020:  Right pleurex cath placed.         Resolved Hospital Problems   No resolved problems to display.

## 2020-05-20 NOTE — TRANSFER OF CARE
"Anesthesia Transfer of Care Note    Patient: Anson Do    Procedure(s) Performed: Procedure(s) (LRB):  [id number] (N/A)    Patient location: PACU    Anesthesia Type: general    Transport from OR: Transported from OR on 2-3 L/min O2 by NC with adequate spontaneous ventilation    Post pain: adequate analgesia    Post assessment: no apparent anesthetic complications    Post vital signs: stable    Level of consciousness: awake and alert    Nausea/Vomiting: no nausea/vomiting    Complications: none    Transfer of care protocol was followed      Last vitals:   Visit Vitals  /79 (BP Location: Right arm)   Pulse 61   Temp 36.1 °C (97 °F) (Tympanic)   Resp 20   Ht 6' 2" (1.88 m)   Wt 109 kg (240 lb 4.8 oz)   SpO2 96%   BMI 30.85 kg/m²     "

## 2020-05-26 NOTE — TELEPHONE ENCOUNTER
Wife called this morning stated that he has a drainage tube in his chest . She wanted to make you aware that on 05/18/20it was 50 on 05/20/20 it was 40 an on 05/24/20 it was 15. She said it was 50 or below 3 different times .She wants to know what she needs to do.

## 2020-05-26 NOTE — NURSING
Pre procedure instructions:  Procedure scheduled @ [facility] for 5/29/2020 @ 10am.  Registration for 8am.  COVID test on 5/27 @ [facility].  Nothing to eat or drink after midnight on 5/28/20.  Someone available to drive you home.

## 2020-05-27 NOTE — TELEPHONE ENCOUNTER
Called and spoke with Yamileth.  Instructed her to decrease frequency of draining pleurex to once or twice a week or as needed.  Will see him in clinic in a month or sooner if needed.    Vincent Torres MD  [facility]  05/26/2020  7:03 PM

## 2020-05-29 NOTE — PLAN OF CARE
Pt back to room via stretcher verbal bedside  report given to reena rn sedation reported, no co pain no acute distress noted, right posterior thoracic biopsy cdi with band aide,specimen sent for pathology

## 2020-05-29 NOTE — DISCHARGE INSTRUCTIONS
CT-Guided Lung Biopsy  CT-guided lung biopsy is a procedure to collect small tissue samples from an abnormal area in your lung. During the procedure, an imaging method called CT (computed tomography) is used to show live pictures of your lung. Then a thin needle is used to remove the tissue samples. The samples are tested in a lab for cancer and other problems.     For the biopsy, a thin needle is used to remove samples of tissue from an abnormal area in the lung.   Getting ready for your procedure  Follow any instructions from your healthcare provider.  Tell your provider about any medicines you are taking. It is important for your provider to know if you are taking any blood-thinning medicines or have a bleeding disorder.  You may need to stop taking all or some medicine before the procedure. This includes:  · All prescription medicines  · Blood-thinning medicines (anticoagulants)  · Over-the-counter medicines such as aspirin or ibuprofen  · Street drugs  · Herbs, vitamins, and other supplements  Also tell your provider if you:  · Are pregnant or think you may be pregnant  · Are breastfeeding  · Are allergic to or have intolerances to any medicines  · Have a chronic cough, new cough, or other illness  · Use oxygen therapy at home  · Smoke or drink alcohol on a regular basis  Follow any directions youre given for not eating or drinking before the procedure.  The day of your procedure  The procedure takes about 60 minutes. The entire procedure (including time to prepare and recover) takes several hours. Youll likely go home the same day.  Before the procedure begins:  · An IV (intravenous) line may be put into a vein in your hand or arm. This line supplies fluids and medicines.  · To keep you free of pain during the procedure, you may be given anesthesia. Depending on the type of anesthesia used, you may be awake, drowsy, or in a deep sleep for the procedure.  During the procedure:  · Youll lie on a CT scan  table. You may be on your back, side, or stomach. Pictures of your lung are then taken using the CT scanner. This helps your provider find the best place to position the needle in your lungs.  · A bruce is made on your skin where the needle will be inserted (biopsy site). The site is injected with numbing medicine.  · Using the CT pictures as a guide, the needle is passed through the numbed skin between your ribs and into your lung. Samples of tissue are then removed from the abnormal area in your lung. The samples are sent to a lab to be checked for problems.  · When the procedure is complete, the needle is removed. Pressure is applied to the biopsy site to help stop any bleeding. The site is then bandaged.  After the procedure:  · Youll be taken to a room to rest until the anesthesia wears off.  · A chest X-ray may be done. This is to make sure there was no damage to your lungs or the area where the needle was placed.  · When its time for you to go home, have an adult family member or friend ready to drive you.  Recovering at home  You may cough up a small amount of blood shortly after the procedure. Later, you may also have some soreness around the biopsy site. Once at home, follow any instructions youre given. Be sure to:  · Take all medicines as directed.  · Care for the biopsy site as instructed.  · Check for signs of infection at the biopsy site (see below).  · Do not bathe or shower until your provider says it's OK. If you wish, you may wash with a sponge or washcloth.  · Avoid heavy lifting and other strenuous activities as directed.  · If you plan any air travel, ask your provider when you can do so. You may be told not to fly for a few weeks. This is because pressure changes may affect your lungs.  When to call your provider  Call 911 or your local emergency number if you have sudden, severe difficulty breathing. This could be a life-threatening emergency.  Call your healthcare provider for less severe  symptoms that are still concerning. If you are unable to speak with your provider, go to the emergency room if you have any of the following symptoms:  · Fever of 100.4°F (38°C) or higher, or as directed by your provider  · Sudden chest pain, shortness of breath, or fainting  · Coughing up increasing amounts of blood  · Signs of infection at the biopsy site, such as increased redness or swelling, warmth, more pain, bleeding, or bad-smelling drainage   Follow-up  The provider who ordered your test will discuss the biopsy results with you during a follow-up visit. Results are usually ready within 1 to 2 weeks. If more tests or treatments are needed, your provider will discuss these with you.  Risks and possible complications  All procedures have some risk. Possible risks of this procedure include:  · An air leak in your lung (pneumothorax). This may require a stay in the hospital and treatment to re-inflate the lung.  · Bleeding into or around the lung  · Infection in the skin or lung  · Injury to other structures in the chest  · Risks of anesthesia. This will be discussed with you before the procedure.   Date Last Reviewed: 6/11/2015  © 4267-2151 [employer], [employer]. [address] [place] [address], [place] [address]. All rights reserved. This information is not intended as a substitute for professional medical care. Always follow your healthcare professional's instructions.        Medical imaging discharge instructions given

## 2020-06-02 NOTE — TELEPHONE ENCOUNTER
1.DO YOU HAVE A COUGH? Yes     HAVE YOU HAD A COUGH SINCE YOUR SURGICAL PROCEDURE? Yes  **PT HAS LUNG CANCER**    2.ARE YOU SHORT OF BREATH? Yes     HAVE YOU EXPERIENCED SHORTNESS OF BREATH SINCE YOUR SURGICAL PROCEDURE? Yes  **PT HAS LUNG CANCER**      3.DO YOU HAVE FEVER?  No     DID YOU HAVE FEVER SINCE YOUR SURGICAL PROCEDURE?  No     IF YES, WHAT WAS YOUR TEMPERATURE?  N/A        4.HAVE YOU BEEN TESTED FOR COVID-19 SINCE YOUR SURGICAL PROCEDURE? Yes      IF YES, WHAT WAS YOUR RESULT? Negative      IF POSITIVE, WHEN WAS RESULT DETERMINED? N/A

## 2020-06-02 NOTE — ASSESSMENT & PLAN NOTE
Patient is doing well post biopsy and the result shows adenocarcinoma but Caris testing is pending.  PET scan was discussed today and the disease seems limited to the chest area.  We discussed the use of pem/pem/carbo and this therapy will begin next week.  Depending on what the caris testing shows, we could possibly change this and will continue to monitor for this result.  We again reviewed the risks and benefits of chemotherapy and will have chemo ed this week as well.  I will continue to see him every cycle.

## 2020-06-02 NOTE — PROGRESS NOTES
PROGRESS NOTE    Subjective:       Patient ID: Anson Do is a 74 y.o. male.    4/20/2020:  Patient presents to [facility] with cough and sob and right pleural effusion.      4/21/2020:  Thoracentesis 2L fluid. Path c/w Adenocarcinoma. CTA shows RUL nodular density.      5/4/2020:  Right pleurex cath placed.    5/29/2020:  Needle biopsy of right lung lesion: adenocarcinoma    Chief Complaint:  No chief complaint on file.  Lung cancer follow up.     History of Present Illness:   Anson Do is a 74 y.o. male who presents for follow up of lung cancer.       Family and Social history reviewed and is unchanged from 5/12/2020      ROS:  Review of Systems   Constitutional: Positive for unexpected weight change. Negative for appetite change and fever.   HENT: Negative for nosebleeds.    Eyes: Negative for visual disturbance.   Respiratory: Negative for cough, chest tightness and shortness of breath.    Cardiovascular: Negative for chest pain.   Gastrointestinal: Negative for abdominal pain, blood in stool and diarrhea.   Genitourinary: Negative for frequency and hematuria.   Musculoskeletal: Negative for back pain.   Skin: Negative for rash.   Neurological: Negative for headaches.   Hematological: Negative for adenopathy. Does not bruise/bleed easily.   Psychiatric/Behavioral: The patient is not nervous/anxious.           Current Outpatient Medications:     albuterol (PROVENTIL/VENTOLIN HFA) 90 mcg/actuation inhaler, Inhale 2 puffs into the lungs every 4 to 6 hours as needed for Shortness of Breath. Rescue, Disp: 18 g, Rfl: 3    COLCHICINE (COLCRYS ORAL), Take by mouth as needed. , Disp: , Rfl:     fexofenadine (ALLEGRA) 180 MG tablet, Take 180 mg by mouth once daily., Disp: , Rfl:     folic acid (FOLVITE) 1 MG tablet, Take 1 tablet (1 mg total) by mouth once daily., Disp: 100 tablet, Rfl: 3    HYDROcodone-acetaminophen (NORCO) 5-325 mg per tablet, Take 1 tablet by  mouth every 4 (four) hours as needed for Pain., Disp: 30 tablet, Rfl: 0    ondansetron (ZOFRAN) 8 MG tablet, Take 1 tablet (8 mg total) by mouth every 8 (eight) hours as needed for Nausea., Disp: 30 tablet, Rfl: 5    promethazine (PHENERGAN) 25 MG tablet, Take 1 tablet (25 mg total) by mouth every 4 to 6 hours as needed., Disp: 30 tablet, Rfl: 5    Current Facility-Administered Medications:     cyanocobalamin injection 1,000 mcg, 1,000 mcg, Subcutaneous, Q30 Days, Blake Donovan MD, 1,000 mcg at 05/12/20 1101        Objective:       Physical Examination:     /73 (BP Location: Left arm, Patient Position: Sitting)   Pulse 60   Temp 97.9 °F (36.6 °C) (Oral)   Resp 12   Wt 109.6 kg (241 lb 9.6 oz)   BMI 31.02 kg/m²     Physical Exam   Constitutional: He is oriented to person, place, and time. He appears well-developed and well-nourished.   HENT:   Head: Normocephalic and atraumatic.   Right Ear: External ear normal.   Left Ear: External ear normal.   Mouth/Throat: Oropharynx is clear and moist.   Eyes: Pupils are equal, round, and reactive to light. Conjunctivae are normal. No scleral icterus.   Neck: Normal range of motion. Neck supple.   Cardiovascular: Normal rate, regular rhythm and normal heart sounds. Exam reveals no gallop and no friction rub.   No murmur heard.  Pulmonary/Chest: Effort normal and breath sounds normal. No respiratory distress. He has no rales. He exhibits no tenderness.   Abdominal: Soft. Bowel sounds are normal. He exhibits no distension and no mass. There is no hepatosplenomegaly. There is no tenderness. There is no rebound and no guarding.   Musculoskeletal: He exhibits no edema.   Lymphadenopathy:        Head (right side): No tonsillar adenopathy present.        Head (left side): No tonsillar adenopathy present.     He has no cervical adenopathy.     He has no axillary adenopathy.        Right: No supraclavicular adenopathy present.        Left: No supraclavicular  adenopathy present.   Neurological: He is alert and oriented to person, place, and time.   Psychiatric: He has a normal mood and affect. His behavior is normal. Judgment and thought content normal.   Vitals reviewed.      Labs:   Recent Results (from the past 336 hour(s))   CBC auto differential    Collection Time: 05/29/20  9:25 AM   Result Value Ref Range    WBC 7.33 3.90 - 12.70 K/uL    Hemoglobin 11.5 (L) 14.0 - 18.0 g/dL    Hematocrit 35.8 (L) 40.0 - 54.0 %    Platelets 389 (H) 150 - 350 K/uL     CMP  Sodium   Date Value Ref Range Status   05/29/2020 139 136 - 145 mmol/L Final     Potassium   Date Value Ref Range Status   05/29/2020 4.3 3.5 - 5.1 mmol/L Final     Chloride   Date Value Ref Range Status   05/29/2020 101 95 - 110 mmol/L Final     CO2   Date Value Ref Range Status   05/29/2020 29 23 - 29 mmol/L Final     Glucose   Date Value Ref Range Status   05/29/2020 109 70 - 110 mg/dL Final     BUN, Bld   Date Value Ref Range Status   05/29/2020 16 8 - 23 mg/dL Final     Creatinine   Date Value Ref Range Status   05/29/2020 1.0 0.5 - 1.4 mg/dL Final     Calcium   Date Value Ref Range Status   05/29/2020 9.1 8.7 - 10.5 mg/dL Final     Total Protein   Date Value Ref Range Status   05/29/2020 7.7 6.0 - 8.4 g/dL Final     Albumin   Date Value Ref Range Status   05/29/2020 3.1 (L) 3.5 - 5.2 g/dL Final     Total Bilirubin   Date Value Ref Range Status   05/29/2020 0.3 0.1 - 1.0 mg/dL Final     Comment:     For infants and newborns, interpretation of results should be based  on gestational age, weight and in agreement with clinical  observations.  Premature Infant recommended reference ranges:  Up to 24 hours.............<8.0 mg/dL  Up to 48 hours............<12.0 mg/dL  3-5 days..................<15.0 mg/dL  6-29 days.................<15.0 mg/dL       Alkaline Phosphatase   Date Value Ref Range Status   05/29/2020 66 55 - 135 U/L Final     AST   Date Value Ref Range Status   05/29/2020 15 10 - 40 U/L Final     ALT    Date Value Ref Range Status   05/29/2020 15 10 - 44 U/L Final     Anion Gap   Date Value Ref Range Status   05/29/2020 9 8 - 16 mmol/L Final     eGFR if    Date Value Ref Range Status   05/29/2020 >60.0 >60 mL/min/1.73 m^2 Final     eGFR if non    Date Value Ref Range Status   05/29/2020 >60.0 >60 mL/min/1.73 m^2 Final     Comment:     Calculation used to obtain the estimated glomerular filtration  rate (eGFR) is the CKD-EPI equation.        No results found for: CEA  Lab Results   Component Value Date    PSA 0.53 05/14/2010    PSA 0.44 12/16/2008    PSA 0.3 11/03/2007           Assessment/Plan:     Problem List Items Addressed This Visit     Malignant neoplasm of upper lobe of right lung     Patient is doing well post biopsy and the result shows adenocarcinoma but Caris testing is pending.  PET scan was discussed today and the disease seems limited to the chest area.  We discussed the use of pem/pem/carbo and this therapy will begin next week.  Depending on what the caris testing shows, we could possibly change this and will continue to monitor for this result.  We again reviewed the risks and benefits of chemotherapy and will have chemo ed this week as well.  I will continue to see him every cycle.           Pleural effusion     Patient continues to drain the pleurex and is down to twice weekly drainage at this time.  Will continue to keep the drain and monitor the output.  May be able to remove in the future.                 Discussion:     Follow up in about 3 weeks (around 6/23/2020).      Electronically signed by Blake Hartman

## 2020-06-02 NOTE — ASSESSMENT & PLAN NOTE
Patient continues to drain the pleurex and is down to twice weekly drainage at this time.  Will continue to keep the drain and monitor the output.  May be able to remove in the future.

## 2020-06-04 NOTE — PROGRESS NOTES
[facility] HEM/ONC PROGRESS NOTE      Subjective:       Patient ID:   Anson Do  74 y.o. male.  1945    Chief Complaint: Chemo School    HPI  75 yo male with lung cancer startig teatment with Carboplatin, Alimta and Keytruda. Patient returns today for a regularly scheduled follow-up visit for a chemo school.  Anson Do  [id number]    Critical access hospital   Cancer Center    TITLE: PLAN OF CARE FOR THE CHEMOTHERAPY PATIENT / TEACHING PROTOCOL    PURPOSE: To involve the patient / significant other in the plan of care and to provide teaching to the significant other & patient receiving chemotherapy.    LEVEL: Independent.    CONTENT: The Plan of Care for the chemotherapy patient is individualized and appropriate to the patients needs, strengths, limitations, & goals.  Education includes information regarding chemotherapy side effects, the treatment itself, and self-care  Activities.    GOAL / OUTCOME STANDARDS    PHYSIOLOGIC: The client will remain free or experience minimal side effects or toxicities throughout the chemotherapy treatment period.     PSYCHOLOGIC: The client/significant others will demonstrate positive coping mechanisms in relation to chemotherapy and its side effects.      COGINITIVE: The client/significant others will verbalize understanding of self-care measure to avoid/minimize side effects of the chemotherapy regime.    EVALUATION / COMMENT KEY:    V = Audiovisual/Video  S = Successfully meets outcome  N = Needs further instruction  NA = Not applicable to the patient  P = Previous knowledge  U = Unable to comprehend  * = See progress notes          PLAN OF CARE  INFORMATION TO BE DELIVERED / NURSING INTERVENTIONS DATE EVALUATION   1. Assessment of client/caregiver,         knowledge of cancer diagnosis,         and chemotherapy as a treatment. 1a. Evaluate patient/caregiver learning ability    b. Plan teaching sessions with patient/caregiver according to needs and present anxiety  level/ability to learn.    c. Provide Chemotherapy Education Packet,        Mouth Care Protocol,         Specific Patient Education Sheets. 06/04/2020 S   2. Individual chemotherapy treatment         plan. 2a. Review of Chemotherapy Education handout from [employer]            06/04/2020   S   3. Knowledge Deficit & Self-Management of general side effects common to all chemotherapy:  a. Nausea/Vomiting  b.   Diarrhea  c. Mouth Care  d. Dental care  e. Constipation  f. Hair Loss  g. Potential for infection  h. Fatigue   3a. Reinforce that the majority of side effects from chemotherapy are reversible and are  controlled both in the hospital and at home        (blood counts recover, hair grows back).   b.  Refer to the following for reinforcement of         information post-treatment:  1. Mouth Care Protocol.  2. Bowel Protocol for constipation or diarrhea.  3.  Drug Specific Chemotherapy Information Sheets for each medication patient receiving.    06/04/2020     S     PLAN OF CARE  INFORMATION TO BE DELIVERED / NURSING INTERVENTIONS DATE EVALUATION   h. Potential for bleeding         i. Potential anemia/fatigue         j. Potential sunburn         k. Birth control measures  l. Safety measures post treatment 4.  Chemotherapy Home Care Instruction  and Safety Information Sheet.  A. patient/caregivers to thoroughly cook shellfish (shrimp, crab, etc) to decrease the chance of infection.    B.  Use sunscreen and protective clothing while in the sun.   06/04/2020      4. Knowledge deficit & Self Management of Drug Specific  Side Effects.    a. BLADDER EFFECTS        (Hemorrhagic Cystitis)                  Preventable with adequate hydration; occurs 2-3 days or more post treatment.   1.  Instruct patient to:  a.   Void at least every 2 hours; increase intake.  b.   DO NOT hold urine; go when urge is felt.  c.    Empty bladder at bedtime and on         awakening.  d.   Observe for color changes (red to tea            colored), amount and frequency changes.  e.   Notify oncologist of any abnormalities           in urine or voiding or if you cannot               drink adequate fluids.   06/04/2020   S   b.   CHANGES IN URINE        COLOR:      1.   Instruct patient:  a.   Most evident in first 2-3 voidings after           administration.  b. Lasts less than 24 hours.  c. If urine is discolored 2 or more days post- treatment, notify oncologist.      06/04/2020 S   c.    KIDNEY EFFECTS           (Nephrotoxicity)   1.  Instruct patient to:  a.   Drink 8-16 glasses of fluid/day the day   pre-treatment and 3-4 days post-treatment to maintain hydration; the best way to minimize kidney problems.  b.   Notify oncologist immediately if unable to drink fluids or if changes are noted in urinary elimination.     06/04/2020   S   a. PULMONARY TOXICITY    1. Instruct patient to report symptoms such as shortness of breath, chest pain, shallow breathing, or chest wall discomfort to physician.  2. Reinforce preventative measures used by the health care team.  a. Baseline and periodic PFT and chest x-ray.   06/04/2020   S     PLAN OF CARE INFORMATION TO BE DELIVERED / NURSING INTERVENTIONS DATE EVALUATION   b. NERVE & MUSCLE EFFECTS (neurotoxocity; neuropathy, possible visual/hearing changes)        3. Instruct patient to:    a. Report numbness or tingling of the hands/feet, loss of fine motor movement (buttoning shirt, tying shoelaces), or gait changes to your oncologist.  b. If numbness/tingling are present:  1. protect feet with shoes at all times.  2. Use gloves for washing dishes/gardening & potholders in kitchen.       06/04/2020   S   c. CARDIOTOXICITY  Decreased effectiveness of             cardiac function. Effective are                  cumulative and irreversible.                                    CARDIAC ARRYTHMIAS              4   Instruct:  a. Heart function may be tested before treatment and perdiocally during treatment.  b. Notify  oncologist of irregular pulse, palpitations, shortness of breath, or swelling in lower extremities/feet.          Taxol and Taxotere can cause arrhythmias on infusion that resolve once infusion discontinued. Instruct nurse if any irregularity felt.    06/04/2020   S   d. EXTRAVASTION  Occurs when vesicants leak outside of vein and cause damage to the skin and underlying tissues.   1. Reinforce preventive measures used to avoid complications.  a. Fresh IV site or central line monitored continuously with vesicant IVP.  b. Continuous infusion via central line site and blood return monitored periodically around the clock.  2. Instruct to:  a. Notify nurse of any discomfort, burning, stinging, etc. at IV site during chemotherapy administration.  b. Notify oncologist of any redness, pain, or swelling at IV site after discharge from hospital.   06/04/2020   S   e. HYPERSENSITIVITY can happen with any medication.   1. Instruct patient:  a. Nurse is with them during the initial part of treatment and will be close by to monitor.  b. Pre-medication ordered by the oncologist must be taken on time. If doses are missed, treatment will need to be re-scheduled.  c. Skin redness, itching, or hives appearing after discharge should be reported to oncologist. 06/04/2020   S       PLAN OF CARE INFORMATION TO BE DELIVERED / NURSING INTERVENTIONS DATE EVALUATION   f. FLU-LIKE SYNDROME      1. Instruct patient symptoms are hard to prevent and may include fever, shaking chills, muscle and body aches.  a. Taking prescribed medications from physician if needed.  b. Adequate fluids are important.    2. Reinforce the need to call if temperature is         elevated to 100.4 or more  06/04/2020   S   g. HAND-FOOT SYNDROME  causes painful, symmetric swelling and redness of palms and soles                  5. Instruct patient to report any numbness or tingling in the hands or feet.  6. Explain prevention techniques, such as     a. Use heavy  moisturizers to lessen skin dryness and itching, but to avoid if skin is cracked or broken  b. Bathe in tepid water, use non-perfumed soap, and wash gently. Baths with oatmeal or diluted baking soda may be soothing.  c. Avoid tight fitting shoes and repetitive actions, such as rubbing hands or applying pressure to hands/feet.  7. Review measures to take should syndrome occur:  a. Cold compresses and elevation for          edema  b. Pain medications and other measures as ordered by oncologist.   4.   Syndrome resolves few weeks after therapy. 06/04/2020   S   5. DISCHARGE PLANNING /        EDUCATION 1.    Explain importance of compliance with follow- up  tests (CBC, CMP).  2.    Verify patient/caregiver know:  a.    Oncologists office phone number.  b.    Dates of follow-up appointments.  c.    Prescriptions given for nausea  3.   Review side effects to monitor and notify          oncologist about.  4.   Reinforce the need for patient and caregivers to:  a.    Review information given.  b.    Call oncologists office with questions          or symptoms  5.   Provide [facility] [employer]ure make referrals if needed for financial or .   06/04/2020   S     PROGRESS NOTES: I met with the patient and his family member today for chemotherapy education. he will be starting treatment with Carboplatin, Alimta and Keytruda . We discussed the mechanism of action, potential side effects of this treatment as well as ways he can manage them at home. Some of these side effects include but or not limited to fever, nausea, vomiting, decreased appetite, fatigue, weakness, cytopenias, myalgia/arthralgia, constipation, diarrhea, bleeding, headache, shortness of breath, nail changes, taste change, hair thinning/loss, mood disturbances, or edema. We also discussed dietary modifications he should make although this will be discussed in more detail with the dietician. he was provided with anti-emetic medication, a  copy of all of the information we discussed today as well as our contact information. he will be provided a schedule on his first day of treatment. We will obtain labs on a weekly basis and the patient will follow-up with the physician for toxicity monitoring throughout treatment. All questions were answered and an informed consent was obtained. he was reminded to certainly contact us sooner if needed.  Attached to the patients folder and discussed with the patient the 24 hour/ 7 days a week after hours telephone number for the physician.  Patient notified to call anytime 24/7 because their is a physician on call for any problems that may arise.  Patient also notified to report to [facility] if they can not get in touch with a physician after hours.  Discussed the five wishes booklet with the patient and their family.          ROS  GEN: normal without any fever, night sweats or weight loss  HEENT: No visual changes.   CV: No chest pain or SOB.   PULM: No SOB or cough.   GI: No abdominal pain, melena, BRBPR.   : normal with no hematuria, dysuria  SKIN: No rash    Allergies:  Review of patient's allergies indicates:  No Known Allergies    Medications:    Current Outpatient Medications:     albuterol (PROVENTIL/VENTOLIN HFA) 90 mcg/actuation inhaler, Inhale 2 puffs into the lungs every 4 to 6 hours as needed for Shortness of Breath. Rescue, Disp: 18 g, Rfl: 3    COLCHICINE (COLCRYS ORAL), Take by mouth as needed. , Disp: , Rfl:     fexofenadine (ALLEGRA) 180 MG tablet, Take 180 mg by mouth once daily., Disp: , Rfl:     folic acid (FOLVITE) 1 MG tablet, Take 1 tablet (1 mg total) by mouth once daily., Disp: 100 tablet, Rfl: 3    HYDROcodone-acetaminophen (NORCO) 5-325 mg per tablet, Take 1 tablet by mouth every 4 (four) hours as needed for Pain., Disp: 30 tablet, Rfl: 0    ondansetron (ZOFRAN) 8 MG tablet, Take 1 tablet (8 mg total) by mouth every 8 (eight) hours as needed for Nausea., Disp: 30 tablet,  Rfl: 5    promethazine (PHENERGAN) 25 MG tablet, Take 1 tablet (25 mg total) by mouth every 4 to 6 hours as needed., Disp: 30 tablet, Rfl: 5    dexAMETHasone (DECADRON) 4 MG Tab, Take 2 tablets (8 mg total) by mouth 2 (two) times a day., Disp: 60 tablet, Rfl: 2    Current Facility-Administered Medications:     cyanocobalamin injection 1,000 mcg, 1,000 mcg, Subcutaneous, Q30 Days, Blake Donovan MD, 1,000 mcg at 05/12/20 1101      Objective:     Vitals:  Blood pressure 118/74, pulse 63, temperature 97.9 °F (36.6 °C), temperature source Oral, resp. rate 20, weight 108.9 kg (240 lb 0.3 oz).    Physical Exam   GEN: no apparent distress, comfortable; AAOx3  HEAD: atraumatic and normocephalic  EYES: no pallor, no icterus, PERRLA  ENT: OMM, no pharyngeal erythema, external ears WNL; no nasal discharge; no thrush  NECK: no masses, thyroid normal, trachea midline, no LAD/LN's, supple  CV: RRR with no murmur; normal pulse; normal S1 and S2; no pedal edema  CHEST: Normal respiratory effort; CTAB; normal breath sounds; no wheeze or crackles  ABDOM: nontender and nondistended; soft; normal bowel sounds.   EXTREM: no clubbing, cyanosis, inflammation or swelling  NEURO: grossly intact; motor/sensory grossly intact; AAOx3; no tremors  PSYCH: normal mood, affect and behavior  LYMPH: normal with no cervical, supraclavicular, axillary lymph nodes palpable.       Labs:   Lab Results   Component Value Date    WBC 7.33 05/29/2020    HGB 11.5 (L) 05/29/2020    HCT 35.8 (L) 05/29/2020    MCV 88 05/29/2020     (H) 05/29/2020    CMP  Sodium   Date Value Ref Range Status   05/29/2020 139 136 - 145 mmol/L Final     Potassium   Date Value Ref Range Status   05/29/2020 4.3 3.5 - 5.1 mmol/L Final     Chloride   Date Value Ref Range Status   05/29/2020 101 95 - 110 mmol/L Final     CO2   Date Value Ref Range Status   05/29/2020 29 23 - 29 mmol/L Final     Glucose   Date Value Ref Range Status   05/29/2020 109 70 - 110 mg/dL Final      BUN, Bld   Date Value Ref Range Status   05/29/2020 16 8 - 23 mg/dL Final     Creatinine   Date Value Ref Range Status   05/29/2020 1.0 0.5 - 1.4 mg/dL Final     Calcium   Date Value Ref Range Status   05/29/2020 9.1 8.7 - 10.5 mg/dL Final     Total Protein   Date Value Ref Range Status   05/29/2020 7.7 6.0 - 8.4 g/dL Final     Albumin   Date Value Ref Range Status   05/29/2020 3.1 (L) 3.5 - 5.2 g/dL Final     Total Bilirubin   Date Value Ref Range Status   05/29/2020 0.3 0.1 - 1.0 mg/dL Final     Comment:     For infants and newborns, interpretation of results should be based  on gestational age, weight and in agreement with clinical  observations.  Premature Infant recommended reference ranges:  Up to 24 hours.............<8.0 mg/dL  Up to 48 hours............<12.0 mg/dL  3-5 days..................<15.0 mg/dL  6-29 days.................<15.0 mg/dL       Alkaline Phosphatase   Date Value Ref Range Status   05/29/2020 66 55 - 135 U/L Final     AST   Date Value Ref Range Status   05/29/2020 15 10 - 40 U/L Final     ALT   Date Value Ref Range Status   05/29/2020 15 10 - 44 U/L Final     Anion Gap   Date Value Ref Range Status   05/29/2020 9 8 - 16 mmol/L Final     eGFR if    Date Value Ref Range Status   05/29/2020 >60.0 >60 mL/min/1.73 m^2 Final     eGFR if non    Date Value Ref Range Status   05/29/2020 >60.0 >60 mL/min/1.73 m^2 Final     Comment:     Calculation used to obtain the estimated glomerular filtration  rate (eGFR) is the CKD-EPI equation.        I have reviewed all available lab results and radiology reports.    Radiology/Diagnostic Studies:        Assessment/Plan:   (1) 74 y.o. male with diagnosis of Lung Cancer- Starting treatment with Carboplatin, Alimta and Keytruda          ICD-10-CM ICD-9-CM   1. Malignant neoplasm of upper lobe of right lung C34.11 162.3       Discussion:   I have spent 60 minutes with the patient face to face discussing the chemotherapy, side  effects and when to call Dr. Donovan.  I have explained and the patient understands all of  the current recommendation(s). I have answered all of their questions to the best of my ability and to their complete satisfaction.   The patient is to continue with the current management plan.    RTC in  3 weeks        Electronically signed by: Love Mckeon, MSN, APRN, AGNP-C, OCN    Answers for HPI/ROS submitted by the patient on 6/1/2020   appetite change : No  unexpected weight change: Yes  visual disturbance: No  adenopathy: No

## 2020-06-15 NOTE — PROGRESS NOTES
CHEMO SCHOOL- NUTRITION    Anson Do is a 74 y.o. male with lung cancer. Pt has started treatment with Carboplatin/ Alimta/ Keytruda. I spoke with pt & his sister over the phone (due to COVID-19 precautions) for chemo school today. Pt endorses dysguesia, unintentional wt loss (25-30# within past 2 months), & nausea s/p first cycle of chemotherapy. He is using baking soda/salt rinse multiple times daily. He is also taking antiemetics around the clock as prescribed. He reports constipation x4 days after his first chemo infusion; he is now taking Miralax daily with relief. He reports that he is now eating much better since the side effects from chemo have subsided. He is aiming for 4-5 smaller meals throughout the day instead of consuming his normal 2 large meals.     CW: 232#. Pt reports UBW: 255#    Plan:   1. Reviewed chemo school packet.  2. Discussed importance of maintaining wt & staying hydrated.   3. Reviewed food safety guidelines recommended during treatment.   4. Continue with baking soda/salt rinses daily + experimenting with different seasonings/spices according to taste.   5. Continue Miralax + antiemetics daily as prescribed.   6. Encouraged 5-6 smaller meals throughout the day as tolerated.   7. Provided RD contact info & encouraged pt to call with any questions/concerns.   8. Will f/u prn.       Electronically signed by: Aurora Clinton MS, RDN, LDN

## 2020-06-17 PROBLEM — I82.B11 SUBCLAVIAN VEIN THROMBOSIS, RIGHT: Status: ACTIVE | Noted: 2020-01-01

## 2020-06-17 PROBLEM — D64.9 ACUTE ON CHRONIC ANEMIA: Status: ACTIVE | Noted: 2020-01-01

## 2020-06-17 NOTE — ED PROVIDER NOTES
Encounter Date: 2020       History     Chief Complaint   Patient presents with    Arm Pain     Patient with history of lung cancer and malignant pleural effusion.  Patient started chemotherapy 9 days ago.  Patient does have a right-sided indwelling port.  Patient noted arm swelling and pain over last 2 days.  He had outpatient ultrasound that was positive for right upper extremity deep vein thrombosis.  He denies any change in his breathing.  No fever or chills.  Patient was told to come to emergency department by Radiology Department.  Patient does have a history of worsening anemia of unclear etiology.  Gastrointestinal bleeding cannot be ruled out.  He is scheduled for EGD this Friday        Review of patient's allergies indicates:  No Known Allergies  Past Medical History:   Diagnosis Date    Adenocarcinoma of lung 2020    Back pain     GERD (gastroesophageal reflux disease)     Pleural effusion on right 2020     Past Surgical History:   Procedure Laterality Date    BACK SURGERY      hernina repair      INSERTION OF PLEURAL CATHETER Right 2020    pleur X Cath    INSERTION OF TUNNELED CENTRAL VENOUS CATHETER (CVC) WITH SUBCUTANEOUS PORT N/A 2020    Procedure: [id number];  Surgeon: Yoli Rivas MD;  Location: [facility];  Service: General;  Laterality: N/A;    LUMBAR SPINE SURGERY      L3-l4    THORACENTESIS Right 2020     Family History   Problem Relation Age of Onset    Allergic rhinitis Neg Hx     Allergies Neg Hx     Angioedema Neg Hx     Asthma Neg Hx     Eczema Neg Hx     Immunodeficiency Neg Hx      Social History     Tobacco Use    Smoking status: Former Smoker     Packs/day: 2.00     Quit date: 1988     Years since quittin.8   Substance Use Topics    Alcohol use: Never     Frequency: Never    Drug use: Not on file     Review of Systems   Constitutional: Negative for chills and fever.   HENT: Negative for sore throat.    Eyes: Negative  for photophobia and visual disturbance.   Respiratory: Negative for shortness of breath.    Cardiovascular: Negative for chest pain.   Gastrointestinal: Negative for abdominal pain and vomiting.        No gastrointestinal bleeding   Genitourinary: Negative for dysuria.   Musculoskeletal: Negative for joint swelling.   Skin: Negative for rash.   Neurological: Negative for weakness and headaches.   Psychiatric/Behavioral: Negative for confusion.       Physical Exam     Initial Vitals [06/17/20 1541]   BP Pulse Resp Temp SpO2   (!) 119/59 (!) 57 18 98.5 °F (36.9 °C) 95 %      MAP       --         Physical Exam    Nursing note and vitals reviewed.  Constitutional: He is not diaphoretic. No distress.   HENT:   Head: Normocephalic and atraumatic.   Eyes: Conjunctivae are normal.   Neck: Normal range of motion.   Cardiovascular: Regular rhythm.   Pulmonary/Chest: Breath sounds normal.   Right chest wall with port in place.  No surrounding erythema.  There is a right-sided PleurX catheter in place that is bandaged.   Abdominal: Soft. There is no abdominal tenderness.   Musculoskeletal: Normal range of motion.      Comments: 2+ radial pulse on right.  Slight right arm edema with no palpable cords.   Neurological: He is alert. He has normal strength. No cranial nerve deficit or sensory deficit.   Skin: No rash noted.   Psychiatric: He has a normal mood and affect.         ED Course   Procedures  Labs Reviewed   COMPREHENSIVE METABOLIC PANEL   APTT   PROTIME-INR          Imaging Results          X-Ray Chest PA And Lateral (Final result)  Result time 06/17/20 16:10:00    Final result by Tobias Ross MD (06/17/20 16:10:00)                 Narrative:    Reason: Pleural effusion    FINDINGS:  PA and lateral chest compared with 5/29/2020 and PET/CT 5/14/2020 show  normal cardiomediastinal silhouette. Right subclavian port catheter  tip at cavoatrial junction unchanged.    Pleural catheter with tip in inferomedial right hemithorax  is  unchanged. Right hemithorax pleural-parenchymal opacity posteriorly  and involving the lower lung zone has not significantly changed. Left  lung remains clear. Pulmonary vasculature is normal. No pneumothorax.  Degenerative changes of bilateral shoulders noted with loose body near  left coracoid unchanged.    IMPRESSION:  Unchanged right pleural effusion and inferior hemithorax  pleural-parenchymal opacities since 5/29/2020.    Electronically Signed by Tobias Ross M.D. on 6/17/2020 4:14 PM                               Medical Decision Making:   History:   Old Medical Records: I decided to obtain old medical records.  Old Records Summarized: records from clinic visits.  Clinical Tests:   Lab Tests: Reviewed  Radiological Study: Reviewed  ED Management:  Patient presents with right arm deep vein thrombosis.  This is associated with Port-A-Cath.  Discussed with Dr. Hartman.  Discussed with Dr. Curry patient's gastroenterologist.  Given cannot completely rule out gastrointestinal bleeding feel patient should be admitted with serial hemoglobins.  If hemoglobin stable gastroenterology will perform EGD tomorrow.  Hematology and gastroenterology recommended 1 dose of Lovenox now.                                 Clinical Impression:       ICD-10-CM ICD-9-CM   1. Pleural effusion  J90 511.9   2. Subclavian vein thrombosis, right  I82.B11 451.89                                Walter Bravo MD  06/17/20 1757

## 2020-06-17 NOTE — ASSESSMENT & PLAN NOTE
In the background of Lung Cancer  Patient already received SQ lovenox today evening in ER  Lovenox can be resumed by primary team Tomorrow after EGD

## 2020-06-17 NOTE — HPI
74 year old patient with recently diagnosed adenocarcinoma getting admitted with DVT of R subclavian vein  Patient has been having pain and Swelling of RUE for past few days  He has R sided Port A Cath  Because of persistence of symptoms pt had USS RUE today which revealed thrombosis  Patient today was also seen by His GI MD today and plan was made to have EGD tomorrow to evaluate anemia  Pt denies any other issues

## 2020-06-17 NOTE — SUBJECTIVE & OBJECTIVE
Past Medical History:   Diagnosis Date    Adenocarcinoma of lung 2020    Back pain     GERD (gastroesophageal reflux disease)     Pleural effusion on right 2020       Past Surgical History:   Procedure Laterality Date    BACK SURGERY  1975    hernina repair      INSERTION OF PLEURAL CATHETER Right 2020    pleur X Cath    INSERTION OF TUNNELED CENTRAL VENOUS CATHETER (CVC) WITH SUBCUTANEOUS PORT N/A 2020    Procedure: [id number];  Surgeon: Yoli Rivas MD;  Location: [facility];  Service: General;  Laterality: N/A;    LUMBAR SPINE SURGERY      L3-l4    THORACENTESIS Right 2020       Review of patient's allergies indicates:  No Known Allergies    Current Facility-Administered Medications on File Prior to Encounter   Medication    cyanocobalamin injection 1,000 mcg     Current Outpatient Medications on File Prior to Encounter   Medication Sig    albuterol (PROVENTIL/VENTOLIN HFA) 90 mcg/actuation inhaler Inhale 2 puffs into the lungs every 4 to 6 hours as needed for Shortness of Breath. Rescue    COLCHICINE (COLCRYS ORAL) Take by mouth as needed.     dexAMETHasone (DECADRON) 4 MG Tab Take 2 tablets (8 mg total) by mouth 2 (two) times a day.    fexofenadine (ALLEGRA) 180 MG tablet Take 180 mg by mouth once daily.    folic acid (FOLVITE) 1 MG tablet Take 1 tablet (1 mg total) by mouth once daily.    ondansetron (ZOFRAN) 8 MG tablet Take 1 tablet (8 mg total) by mouth every 8 (eight) hours as needed for Nausea.    promethazine (PHENERGAN) 25 MG tablet Take 1 tablet (25 mg total) by mouth every 4 to 6 hours as needed.    [DISCONTINUED] HYDROcodone-acetaminophen (NORCO) 5-325 mg per tablet Take 1 tablet by mouth every 4 (four) hours as needed for Pain.     Family History     None        Tobacco Use    Smoking status: Former Smoker     Packs/day: 2.00     Quit date: 1988     Years since quittin.8   Substance and Sexual Activity    Alcohol use: Never      Frequency: Never    Drug use: Not on file    Sexual activity: Not on file     Review of Systems   Constitutional: Negative for activity change and appetite change.   HENT: Negative for congestion and dental problem.    Eyes: Negative for discharge and itching.   Respiratory: Negative for shortness of breath.    Cardiovascular: Negative for chest pain.   Gastrointestinal: Negative for abdominal distention and abdominal pain.   Endocrine: Negative for cold intolerance.   Genitourinary: Negative for difficulty urinating and dysuria.   Musculoskeletal: Negative for arthralgias and back pain.   Skin: Negative for color change.   Neurological: Negative for dizziness and facial asymmetry.   Hematological: Negative for adenopathy.   Psychiatric/Behavioral: Negative for agitation and behavioral problems.     Objective:     Vital Signs (Most Recent):  Temp: 98.5 °F (36.9 °C) (06/17/20 1541)  Pulse: (!) 57 (06/17/20 1541)  Resp: 18 (06/17/20 1541)  BP: (!) 119/59 (06/17/20 1541)  SpO2: 95 % (06/17/20 1541) Vital Signs (24h Range):  Temp:  [98.5 °F (36.9 °C)] 98.5 °F (36.9 °C)  Pulse:  [57-61] 57  Resp:  [18] 18  SpO2:  [95 %] 95 %  BP: (107-119)/(59-63) 119/59     Weight: 106.1 kg (234 lb)  Body mass index is 30.04 kg/m².    Physical Exam  Vitals signs and nursing note reviewed.   Constitutional:       Appearance: He is well-developed.   HENT:      Head: Normocephalic and atraumatic.      Right Ear: External ear normal.      Left Ear: External ear normal.      Nose: Nose normal.   Eyes:      Pupils: Pupils are equal, round, and reactive to light.   Neck:      Musculoskeletal: Full passive range of motion without pain and normal range of motion.   Cardiovascular:      Rate and Rhythm: Normal rate and regular rhythm.   Pulmonary:      Effort: Pulmonary effort is normal.   Abdominal:      General: Bowel sounds are normal.      Palpations: Abdomen is soft.   Musculoskeletal: Normal range of motion.   Skin:     General: Skin is  warm.   Neurological:      Mental Status: He is alert and oriented to person, place, and time.   Psychiatric:         Mood and Affect: Mood normal.           CRANIAL NERVES     CN III, IV, VI   Pupils are equal, round, and reactive to light.       Significant Labs:   CBC:   Recent Labs   Lab 06/17/20  1325   WBC 2.97*   HGB 9.5*   HCT 28.9*   *     CMP:   Recent Labs   Lab 06/17/20  1625      K 3.7   CL 96   CO2 29      BUN 16   CREATININE 1.3   CALCIUM 8.7   PROT 7.0   ALBUMIN 3.0*   BILITOT 0.5   ALKPHOS 67   AST 16   ALT 17   ANIONGAP 11   EGFRNONAA 53.8*       Significant Imaging: I have reviewed all pertinent imaging results/findings within the past 24 hours.

## 2020-06-17 NOTE — H&P
[facility] Medicine  History & Physical    Patient Name: Anson Do  MRN: [id number]  Admission Date: 6/17/2020  Attending Physician: Miguelangel Woodward MD   Primary Care Provider: Nisha Curry MD         Patient information was obtained from patient and ER records.     Subjective:     Principal Problem:Subclavian vein thrombosis, right    Chief Complaint:   Chief Complaint   Patient presents with    Arm Pain        HPI: 74 year old patient with recently diagnosed adenocarcinoma getting admitted with DVT of R subclavian vein  Patient has been having pain and Swelling of RUE for past few days  He has R sided Port A Cath  Because of persistence of symptoms pt had USS RUE today which revealed thrombosis  Patient today was also seen by His GI MD today and plan was made to have EGD tomorrow to evaluate anemia  Pt denies any other issues    Past Medical History:   Diagnosis Date    Adenocarcinoma of lung 04/2020    Back pain     GERD (gastroesophageal reflux disease)     Pleural effusion on right 04/2020       Past Surgical History:   Procedure Laterality Date    BACK SURGERY  1975    hernina repair      INSERTION OF PLEURAL CATHETER Right 05/2020    pleur X Cath    INSERTION OF TUNNELED CENTRAL VENOUS CATHETER (CVC) WITH SUBCUTANEOUS PORT N/A 5/20/2020    Procedure: [id number];  Surgeon: Yoli Rivas MD;  Location: [facility];  Service: General;  Laterality: N/A;    LUMBAR SPINE SURGERY      L3-l4    THORACENTESIS Right 04/2020       Review of patient's allergies indicates:  No Known Allergies    Current Facility-Administered Medications on File Prior to Encounter   Medication    cyanocobalamin injection 1,000 mcg     Current Outpatient Medications on File Prior to Encounter   Medication Sig    albuterol (PROVENTIL/VENTOLIN HFA) 90 mcg/actuation inhaler Inhale 2 puffs into the lungs every 4 to 6 hours as needed for Shortness of Breath. Rescue    COLCHICINE (COLCRYS ORAL)  Take by mouth as needed.     dexAMETHasone (DECADRON) 4 MG Tab Take 2 tablets (8 mg total) by mouth 2 (two) times a day.    fexofenadine (ALLEGRA) 180 MG tablet Take 180 mg by mouth once daily.    folic acid (FOLVITE) 1 MG tablet Take 1 tablet (1 mg total) by mouth once daily.    ondansetron (ZOFRAN) 8 MG tablet Take 1 tablet (8 mg total) by mouth every 8 (eight) hours as needed for Nausea.    promethazine (PHENERGAN) 25 MG tablet Take 1 tablet (25 mg total) by mouth every 4 to 6 hours as needed.    [DISCONTINUED] HYDROcodone-acetaminophen (NORCO) 5-325 mg per tablet Take 1 tablet by mouth every 4 (four) hours as needed for Pain.     Family History     None        Tobacco Use    Smoking status: Former Smoker     Packs/day: 2.00     Quit date: 1988     Years since quittin.8   Substance and Sexual Activity    Alcohol use: Never     Frequency: Never    Drug use: Not on file    Sexual activity: Not on file     Review of Systems   Constitutional: Negative for activity change and appetite change.   HENT: Negative for congestion and dental problem.    Eyes: Negative for discharge and itching.   Respiratory: Negative for shortness of breath.    Cardiovascular: Negative for chest pain.   Gastrointestinal: Negative for abdominal distention and abdominal pain.   Endocrine: Negative for cold intolerance.   Genitourinary: Negative for difficulty urinating and dysuria.   Musculoskeletal: Negative for arthralgias and back pain.   Skin: Negative for color change.   Neurological: Negative for dizziness and facial asymmetry.   Hematological: Negative for adenopathy.   Psychiatric/Behavioral: Negative for agitation and behavioral problems.     Objective:     Vital Signs (Most Recent):  Temp: 98.5 °F (36.9 °C) (20 154)  Pulse: (!) 57 (20 154)  Resp: 18 (20 154)  BP: (!) 119/59 (20 154)  SpO2: 95 % (20) Vital Signs (24h Range):  Temp:  [98.5 °F (36.9 °C)] 98.5 °F (36.9  °C)  Pulse:  [57-61] 57  Resp:  [18] 18  SpO2:  [95 %] 95 %  BP: (107-119)/(59-63) 119/59     Weight: 106.1 kg (234 lb)  Body mass index is 30.04 kg/m².    Physical Exam  Vitals signs and nursing note reviewed.   Constitutional:       Appearance: He is well-developed.   HENT:      Head: Normocephalic and atraumatic.      Right Ear: External ear normal.      Left Ear: External ear normal.      Nose: Nose normal.   Eyes:      Pupils: Pupils are equal, round, and reactive to light.   Neck:      Musculoskeletal: Full passive range of motion without pain and normal range of motion.   Cardiovascular:      Rate and Rhythm: Normal rate and regular rhythm.   Pulmonary:      Effort: Pulmonary effort is normal.   Abdominal:      General: Bowel sounds are normal.      Palpations: Abdomen is soft.   Musculoskeletal: Normal range of motion.   Skin:     General: Skin is warm.   Neurological:      Mental Status: He is alert and oriented to person, place, and time.   Psychiatric:         Mood and Affect: Mood normal.           CRANIAL NERVES     CN III, IV, VI   Pupils are equal, round, and reactive to light.       Significant Labs:   CBC:   Recent Labs   Lab 06/17/20  1325   WBC 2.97*   HGB 9.5*   HCT 28.9*   *     CMP:   Recent Labs   Lab 06/17/20  1625      K 3.7   CL 96   CO2 29      BUN 16   CREATININE 1.3   CALCIUM 8.7   PROT 7.0   ALBUMIN 3.0*   BILITOT 0.5   ALKPHOS 67   AST 16   ALT 17   ANIONGAP 11   EGFRNONAA 53.8*       Significant Imaging: I have reviewed all pertinent imaging results/findings within the past 24 hours.    Assessment/Plan:     * Subclavian vein thrombosis, right  In the background of Lung Cancer  Patient already received SQ lovenox today evening in ER  Lovenox can be resumed by primary team Tomorrow after EGD      Acute on chronic anemia  EGD tomorrow as per plan by GI team      Malignant neoplasm of upper lobe of right lung  Recently diagnosed Lung Cancer  Will consult pts  Onco MD        Former smoker  Stable       VTE Risk Mitigation (From admission, onward)         Ordered     IP VTE HIGH RISK PATIENT  Once      06/17/20 6238                   Miguelangel Woodward MD  Department of Hospital Medicine   [facility]

## 2020-06-17 NOTE — PROGRESS NOTES
[facility]    Subjective:     Chief Complaint:   Chief Complaint   Patient presents with    Right Upper Arm - Pain     Right upper arm muscle pain x 10 days. States that he woke up one morning with his muscle being sore, states that he is not sure what he did, just constantly sore. Pain is worse with movements        Past Medical History:   Diagnosis Date    Adenocarcinoma of lung 04/2020    Back pain     GERD (gastroesophageal reflux disease)     Pleural effusion on right 04/2020       Past Surgical History:   Procedure Laterality Date    BACK SURGERY  1975    hernina repair      INSERTION OF PLEURAL CATHETER Right 05/2020    pleur X Cath    INSERTION OF TUNNELED CENTRAL VENOUS CATHETER (CVC) WITH SUBCUTANEOUS PORT N/A 5/20/2020    Procedure: [id number];  Surgeon: Yoli Rivas MD;  Location: [facility];  Service: General;  Laterality: N/A;    LUMBAR SPINE SURGERY      L3-l4    THORACENTESIS Right 04/2020       Current Outpatient Medications   Medication Sig    albuterol (PROVENTIL/VENTOLIN HFA) 90 mcg/actuation inhaler Inhale 2 puffs into the lungs every 4 to 6 hours as needed for Shortness of Breath. Rescue    dexAMETHasone (DECADRON) 4 MG Tab Take 2 tablets (8 mg total) by mouth 2 (two) times a day.    fexofenadine (ALLEGRA) 180 MG tablet Take 180 mg by mouth once daily.    folic acid (FOLVITE) 1 MG tablet Take 1 tablet (1 mg total) by mouth once daily.    ondansetron (ZOFRAN) 8 MG tablet Take 1 tablet (8 mg total) by mouth every 8 (eight) hours as needed for Nausea.    promethazine (PHENERGAN) 25 MG tablet Take 1 tablet (25 mg total) by mouth every 4 to 6 hours as needed.    COLCHICINE (COLCRYS ORAL) Take by mouth as needed.      Current Facility-Administered Medications   Medication    cyanocobalamin injection 1,000 mcg       Review of patient's allergies indicates:  No Known Allergies    Family History   Problem Relation Age of Onset    Allergic rhinitis Neg Hx      Allergies Neg Hx     Angioedema Neg Hx     Asthma Neg Hx     Eczema Neg Hx     Immunodeficiency Neg Hx        Social History     Socioeconomic History    Marital status:      Spouse name: Not on file    Number of children: Not on file    Years of education: Not on file    Highest education level: Not on file   Occupational History    Not on file   Social Needs    Financial resource strain: Not on file    Food insecurity     Worry: Not on file     Inability: Not on file    Transportation needs     Medical: Not on file     Non-medical: Not on file   Tobacco Use    Smoking status: Former Smoker     Packs/day: 2.00     Quit date: 1988     Years since quittin.8   Substance and Sexual Activity    Alcohol use: Never     Frequency: Never    Drug use: Not on file    Sexual activity: Not on file   Lifestyle    Physical activity     Days per week: Not on file     Minutes per session: Not on file    Stress: Not on file   Relationships    Social connections     Talks on phone: Not on file     Gets together: Not on file     Attends Shinto service: Not on file     Active member of club or organization: Not on file     Attends meetings of clubs or organizations: Not on file     Relationship status: Not on file   Other Topics Concern    Not on file   Social History Narrative    Not on file       History of present illness:  This man has some medial-sided right upper arm pain for about a week or so.  No particular injury.  He is being treated for cancer by Dr. Zeeshan Gil lung cancer.  He has a draining area right chest wall.  He has had some chemotherapy but no radiation.  Cancer been diagnosed couple months.      Review of Systems:    Constitution: Negative for chills, fever, and sweats.  Negative for unexplained weight loss.    HENT:  Negative for headaches and blurry vision.    Cardiovascular:Negative for chest pain or irregular heart beat. Negative for hypertension.    Respiratory:   Negative for cough and shortness of breath.    Gastrointestinal: Negative for abdominal pain, heartburn, melena, nausea, and vomitting.    Genitourinary:  Negative bladder incontinence and dysuria.    Musculoskeletal:  See HPI for details.     Neurological: Negative for numbness.    Psychiatric/Behavioral: Negative for depression.  The patient is not nervous/anxious.      Endocrine: Negative for polyuria    Hematologic/Lymphatic: Negative for bleeding problem.  Does not bruise/bleed easily.    Skin: Negative for poor would healing and rash    Objective:      Physical Examination:    Vital Signs:    Vitals:    06/17/20 0858   BP: 107/63   Pulse: 61       Body mass index is 29.92 kg/m².    This a well-developed, well nourished patient in no acute distress.  They are alert and oriented and cooperative to examination.        So on physical exam he has got pretty good range of motion shoulder without pain.  He has no tenderness down at the elbow.  He is tender along the medial side of the upper arm there is some localized swelling there and I can not feel thickening consistent with the DVT in the cephalic vein region of the upper arm starting at the elbow and to the axilla.  He has got a port subcu in the right chest wall.  Mild swelling below the elbow.  Clearly some swelling on the medial side of the upper arm.  Pertinent New Results:    XRAY Report / Interpretation:   AP lateral right humerus show there is some degenerative change of the glenohumeral joint moderate degree with spurring.  Subdeltoid space normal.  Humeral shaft on AP and lateral looks normal.  Signature    Assessment/Plan:      My impression I think he has a DVT right upper arm.  We sent him over for typical of venous ultrasound DVT study.  Results to be sent to Dr. Zeeshan COOLEY than his cancer doctor and hematologist.  He certainly has a pre-existing reasons for blood clot with a lung cancer etc. I do not see any mechanical orthopedic issues.      This  note was created using Dragon voice recognition software that occasionally misinterpreted phrases or words.

## 2020-06-18 NOTE — HOSPITAL COURSE
Patient was admitted with anemia and also subclavian vein thrombosis on the right side.  Patient had previous history of lung cancer on the right and secondary to combination of cancer, chemotherapy and the port .  EGD was done for chronic anemia and no significant bleeding point is identified and patient was later started on Eliquis and discharged in stable condition.  Vascular surgeon was consulted for the possible catheter based thrombectomy.need and did not recommend.  She is to follow-up with GI and oncology in short time.

## 2020-06-18 NOTE — CONSULTS
[facility]  Hematology/Oncology  Consult Note    Patient Name: Anson Do  MRN: [id number]  Admission Date: 6/17/2020  Hospital Length of Stay: 0 days  Code Status: Full Code   Attending Provider: Kenney Pacheco MD  Consulting Provider: Blake Hartman MD  Primary Care Physician: No primary care provider on file.  Principal Problem:Subclavian vein thrombosis, right    Consults  Subjective:     HPI:  4/20/2020:  Patient presents to [facility] with cough and sob and right pleural effusion.      4/21/2020:  Thoracentesis 2L fluid. Path c/w Adenocarcinoma. CTA shows RUL nodular density.      5/4/2020:  Right pleurex cath placed.     5/29/2020:  Needle biopsy of right lung lesion: adenocarcinoma    Mr. Do is a 75yo male with a recent diagnosis of stage IV adenocarcinoma of the lung.  Patient has started treatment with chemoimmunotherapy and is s/p cycle 1 of carboplatin/pemetrexed/pembrolizumab on 6/8/2020.  He tolerated this fairly well and was doing fine until approximately 10 days ago when he noticed swelling of the LUE.  This continued to worsen over since that time ultimately prompting him to see Dr. Jones who ordered an U/S of the arm.  This U/S did show a deep venous thrombosis.  He has had no sob or chest pain during this time.      He has also been worked up for anemia and was to have an EGD per Dr. Curry tomorrow.  She was consulted from the ED and will perform this today.  He has had no melena or brbpr.  He was given on injection of Lovenox last night.      Oncology Treatment Plan:   OP NSCLC PEMBROLIZUMAB PEMETREXED CARBOPLATIN Q3W    Medications:  Continuous Infusions:  Scheduled Meds:   polyethylene glycol  17 g Oral Daily     PRN Meds:albuterol, magnesium oxide, magnesium oxide, ondansetron, potassium chloride 10%, potassium chloride 10%     Review of patient's allergies indicates:  No Known Allergies     Past Medical History:   Diagnosis Date    Adenocarcinoma of lung 04/2020    Back pain      GERD (gastroesophageal reflux disease)     Pleural effusion on right 2020     Past Surgical History:   Procedure Laterality Date    BACK SURGERY  1975    hernina repair      INSERTION OF PLEURAL CATHETER Right 2020    pleur X Cath    INSERTION OF TUNNELED CENTRAL VENOUS CATHETER (CVC) WITH SUBCUTANEOUS PORT N/A 2020    Procedure: [id number]-CATH;  Surgeon: Yoli Rivas MD;  Location: [facility];  Service: General;  Laterality: N/A;    LUMBAR SPINE SURGERY      L3-l4    THORACENTESIS Right 2020     Family History     None        Tobacco Use    Smoking status: Former Smoker     Packs/day: 2.00     Quit date: 1988     Years since quittin.9   Substance and Sexual Activity    Alcohol use: Never     Frequency: Never    Drug use: Not on file    Sexual activity: Not on file       Review of Systems   Constitutional: Negative for activity change, chills, diaphoresis, fatigue, fever and unexpected weight change.   HENT: Negative for congestion, mouth sores and nosebleeds.    Eyes: Negative for visual disturbance.   Respiratory: Negative for chest tightness and shortness of breath.    Cardiovascular: Negative for chest pain and leg swelling.   Gastrointestinal: Negative for abdominal pain, nausea and vomiting.   Endocrine: Negative for cold intolerance and heat intolerance.   Genitourinary: Negative for difficulty urinating and dysuria.   Skin: Negative for rash.   Neurological: Negative for dizziness, seizures, weakness, light-headedness and headaches.   Hematological: Negative for adenopathy. Does not bruise/bleed easily.   Psychiatric/Behavioral: Negative for agitation and behavioral problems.     Objective:     Vital Signs (Most Recent):  Temp: 98.8 °F (37.1 °C) (20 0800)  Pulse: (!) 48 (20 0800)  Resp: 18 (20 0800)  BP: 113/64 (20 0800)  SpO2: 95 % (20 0800) Vital Signs (24h Range):  Temp:  [98.3 °F (36.8 °C)-98.8 °F (37.1 °C)] 98.8 °F (37.1  °C)  Pulse:  [48-62] 48  Resp:  [16-18] 18  SpO2:  [93 %-96 %] 95 %  BP: (111-132)/(59-77) 113/64     Weight: 106.1 kg (234 lb)  Body mass index is 30.04 kg/m².  Body surface area is 2.35 meters squared.      Intake/Output Summary (Last 24 hours) at 6/18/2020 0891  Last data filed at 6/18/2020 0429  Gross per 24 hour   Intake 800 ml   Output --   Net 800 ml       Physical Exam  Vitals signs reviewed.   Constitutional:       Appearance: He is well-developed.   HENT:      Head: Normocephalic and atraumatic.      Right Ear: External ear normal.      Left Ear: External ear normal.   Eyes:      General: No scleral icterus.     Conjunctiva/sclera: Conjunctivae normal.      Pupils: Pupils are equal, round, and reactive to light.   Neck:      Musculoskeletal: Normal range of motion and neck supple.   Cardiovascular:      Rate and Rhythm: Normal rate and regular rhythm.      Heart sounds: Normal heart sounds. No murmur. No friction rub. No gallop.    Pulmonary:      Effort: Pulmonary effort is normal. No respiratory distress.      Breath sounds: Normal breath sounds. No rales.   Chest:      Chest wall: No tenderness.   Abdominal:      General: Bowel sounds are normal. There is no distension.      Palpations: Abdomen is soft. There is no mass.      Tenderness: There is no abdominal tenderness. There is no guarding or rebound.   Musculoskeletal:      Comments: 2+ RUE edema noted.    Lymphadenopathy:      Head:      Right side of head: No tonsillar adenopathy.      Left side of head: No tonsillar adenopathy.      Cervical: No cervical adenopathy.      Upper Body:      Right upper body: No supraclavicular adenopathy.      Left upper body: No supraclavicular adenopathy.   Neurological:      Mental Status: He is alert and oriented to person, place, and time.   Psychiatric:         Behavior: Behavior normal.         Thought Content: Thought content normal.         Judgment: Judgment normal.         Significant Labs:   CBC:   Recent  Labs   Lab 06/17/20  1325 06/18/20  0522   WBC 2.97* 2.44*   HGB 9.5* 9.1*   HCT 28.9* 28.4*   * 126*       Diagnostic Results:  None    Assessment/Plan:     * Subclavian vein thrombosis, right  This is a new Dx and is likely due to the combination of cancer, chemotherapy and the port which is placed on the right side.  I discussed with him that he will have EGD today and as long as there is no bleeding issues, we will transition him to Eliquis therapy.  I would treat him with 5mg bid without loading dose out of an abundance of caution as it relates to his anemia.      If there is no bleeding issues, I feel he can likely go home later today.     Malignant neoplasm of upper lobe of right lung  Patient has only recently started chemotherapy for this.  At this point he is not due for another 2 weeks for therapy and I suspect he will be able to stay on schedule.  Will continue to monitor clinical situation for changes however.         Thank you for your consult. I will follow-up with patient. Please contact us if you have any additional questions.    Blake Hartman MD  Hematology/Oncology  [facility]

## 2020-06-18 NOTE — ASSESSMENT & PLAN NOTE
This is a new Dx and is likely due to the combination of cancer, chemotherapy and the port which is placed on the right side.  I discussed with him that he will have EGD today and as long as there is no bleeding issues, we will transition him to Eliquis therapy.  I would treat him with 5mg bid without loading dose out of an abundance of caution as it relates to his anemia.      If there is no bleeding issues, I feel he can likely go home later today.

## 2020-06-18 NOTE — PLAN OF CARE
06/17/20 2100   Patient Assessment/Suction   Level of Consciousness (AVPU) alert   Respiratory Effort Unlabored   Expansion/Accessory Muscles/Retractions no use of accessory muscles   All Lung Fields Breath Sounds clear   Rhythm/Pattern, Respiratory unlabored   Cough Frequency no cough   PRE-TX-O2   O2 Device (Oxygen Therapy) room air   SpO2 (!) 93 %   Pulse 62   Resp 18   Inhaler   $ Inhaler Charges PRN treatment not required   Respiratory Treatment Status (Inhaler) PRN treatment not required   Respiratory Evaluation   $ Care Plan Tech Time 15 min   Evaluation For   (CARE PLAN)

## 2020-06-18 NOTE — SUBJECTIVE & OBJECTIVE
Oncology Treatment Plan:   OP NSCLC PEMBROLIZUMAB PEMETREXED CARBOPLATIN Q3W    Medications:  Continuous Infusions:  Scheduled Meds:   polyethylene glycol  17 g Oral Daily     PRN Meds:albuterol, magnesium oxide, magnesium oxide, ondansetron, potassium chloride 10%, potassium chloride 10%     Review of patient's allergies indicates:  No Known Allergies     Past Medical History:   Diagnosis Date    Adenocarcinoma of lung 2020    Back pain     GERD (gastroesophageal reflux disease)     Pleural effusion on right 2020     Past Surgical History:   Procedure Laterality Date    BACK SURGERY  1975    hernina repair      INSERTION OF PLEURAL CATHETER Right 2020    pleur X Cath    INSERTION OF TUNNELED CENTRAL VENOUS CATHETER (CVC) WITH SUBCUTANEOUS PORT N/A 2020    Procedure: [id number]-CATH;  Surgeon: Yoli Rivas MD;  Location: [facility];  Service: General;  Laterality: N/A;    LUMBAR SPINE SURGERY      L3-l4    THORACENTESIS Right 2020     Family History     None        Tobacco Use    Smoking status: Former Smoker     Packs/day: 2.00     Quit date: 1988     Years since quittin.9   Substance and Sexual Activity    Alcohol use: Never     Frequency: Never    Drug use: Not on file    Sexual activity: Not on file       Review of Systems   Constitutional: Negative for activity change, chills, diaphoresis, fatigue, fever and unexpected weight change.   HENT: Negative for congestion, mouth sores and nosebleeds.    Eyes: Negative for visual disturbance.   Respiratory: Negative for chest tightness and shortness of breath.    Cardiovascular: Negative for chest pain and leg swelling.   Gastrointestinal: Negative for abdominal pain, nausea and vomiting.   Endocrine: Negative for cold intolerance and heat intolerance.   Genitourinary: Negative for difficulty urinating and dysuria.   Skin: Negative for rash.   Neurological: Negative for dizziness, seizures, weakness, light-headedness  and headaches.   Hematological: Negative for adenopathy. Does not bruise/bleed easily.   Psychiatric/Behavioral: Negative for agitation and behavioral problems.     Objective:     Vital Signs (Most Recent):  Temp: 98.8 °F (37.1 °C) (06/18/20 0800)  Pulse: (!) 48 (06/18/20 0800)  Resp: 18 (06/18/20 0800)  BP: 113/64 (06/18/20 0800)  SpO2: 95 % (06/18/20 0800) Vital Signs (24h Range):  Temp:  [98.3 °F (36.8 °C)-98.8 °F (37.1 °C)] 98.8 °F (37.1 °C)  Pulse:  [48-62] 48  Resp:  [16-18] 18  SpO2:  [93 %-96 %] 95 %  BP: (111-132)/(59-77) 113/64     Weight: 106.1 kg (234 lb)  Body mass index is 30.04 kg/m².  Body surface area is 2.35 meters squared.      Intake/Output Summary (Last 24 hours) at 6/18/2020 0858  Last data filed at 6/18/2020 0429  Gross per 24 hour   Intake 800 ml   Output --   Net 800 ml       Physical Exam  Vitals signs reviewed.   Constitutional:       Appearance: He is well-developed.   HENT:      Head: Normocephalic and atraumatic.      Right Ear: External ear normal.      Left Ear: External ear normal.   Eyes:      General: No scleral icterus.     Conjunctiva/sclera: Conjunctivae normal.      Pupils: Pupils are equal, round, and reactive to light.   Neck:      Musculoskeletal: Normal range of motion and neck supple.   Cardiovascular:      Rate and Rhythm: Normal rate and regular rhythm.      Heart sounds: Normal heart sounds. No murmur. No friction rub. No gallop.    Pulmonary:      Effort: Pulmonary effort is normal. No respiratory distress.      Breath sounds: Normal breath sounds. No rales.   Chest:      Chest wall: No tenderness.   Abdominal:      General: Bowel sounds are normal. There is no distension.      Palpations: Abdomen is soft. There is no mass.      Tenderness: There is no abdominal tenderness. There is no guarding or rebound.   Musculoskeletal:      Comments: 2+ RUE edema noted.    Lymphadenopathy:      Head:      Right side of head: No tonsillar adenopathy.      Left side of head: No  tonsillar adenopathy.      Cervical: No cervical adenopathy.      Upper Body:      Right upper body: No supraclavicular adenopathy.      Left upper body: No supraclavicular adenopathy.   Neurological:      Mental Status: He is alert and oriented to person, place, and time.   Psychiatric:         Behavior: Behavior normal.         Thought Content: Thought content normal.         Judgment: Judgment normal.         Significant Labs:   CBC:   Recent Labs   Lab 06/17/20  1325 06/18/20  0522   WBC 2.97* 2.44*   HGB 9.5* 9.1*   HCT 28.9* 28.4*   * 126*       Diagnostic Results:  None

## 2020-06-18 NOTE — CONSULTS
[facility]  Vascular Surgery  Consult Note    Inpatient consult to Vascular Surgery  Consult performed by: Ali Khoobehi, MD  Consult ordered by: Kenney Pacheco MD        Subjective:     Chief Complaint/Reason for Admission: DVT    History of Present Illness: 75yo male with a recent diagnosis of stage IV adenocarcinoma of the lung, presented to hospital with right arm swelling x 10 days. Pt has a hx of a right subclavian chemoport placed a month prior. No fevers or chills. US shows DVT RUE extending to antecubital region.    Facility-Administered Medications Prior to Admission   Medication Dose Route Frequency Provider Last Rate Last Dose    cyanocobalamin injection 1,000 mcg  1,000 mcg Subcutaneous Q30 Days Blake Donovan MD   1,000 mcg at 06/08/20 1034     Medications Prior to Admission   Medication Sig Dispense Refill Last Dose    folic acid (FOLVITE) 1 MG tablet Take 1 tablet (1 mg total) by mouth once daily. 100 tablet 3 6/17/2020 at 06:30    ondansetron (ZOFRAN) 8 MG tablet Take 1 tablet (8 mg total) by mouth every 8 (eight) hours as needed for Nausea. 30 tablet 5 6/17/2020 at 06:30    albuterol (PROVENTIL/VENTOLIN HFA) 90 mcg/actuation inhaler Inhale 2 puffs into the lungs every 4 to 6 hours as needed for Shortness of Breath. Rescue 18 g 3 Unknown at Unknown time       Review of patient's allergies indicates:  No Known Allergies    Past Medical History:   Diagnosis Date    Adenocarcinoma of lung 04/2020    Back pain     GERD (gastroesophageal reflux disease)     Pleural effusion on right 04/2020     Past Surgical History:   Procedure Laterality Date    BACK SURGERY  1975    hernina repair      INSERTION OF PLEURAL CATHETER Right 05/2020    pleur X Cath    INSERTION OF TUNNELED CENTRAL VENOUS CATHETER (CVC) WITH SUBCUTANEOUS PORT N/A 5/20/2020    Procedure: [id number];  Surgeon: Yoli Rivas MD;  Location: [facility];  Service: General;  Laterality: N/A;     LUMBAR SPINE SURGERY      L3-l4    THORACENTESIS Right 2020     Family History     None        Tobacco Use    Smoking status: Former Smoker     Packs/day: 2.00     Quit date: 1988     Years since quittin.9   Substance and Sexual Activity    Alcohol use: Never     Frequency: Never    Drug use: Not on file    Sexual activity: Not on file     Review of Systems   Constitutional: Positive for fatigue. Negative for chills and fever.   Respiratory: Negative.    Cardiovascular: Negative.    Gastrointestinal: Negative for abdominal distention and abdominal pain.   Musculoskeletal: Negative.    All other systems reviewed and are negative.    Objective:     Vital Signs (Most Recent):  Temp: 98.5 °F (36.9 °C) (20 1200)  Pulse: (!) 52 (20 1200)  Resp: 20 (20 1200)  BP: 118/68 (20 1200)  SpO2: (!) 94 % (20 1200) Vital Signs (24h Range):  Temp:  [98.3 °F (36.8 °C)-98.8 °F (37.1 °C)] 98.5 °F (36.9 °C)  Pulse:  [48-62] 52  Resp:  [16-20] 20  SpO2:  [93 %-96 %] 94 %  BP: (111-132)/(59-77) 118/68     Weight: 106.1 kg (234 lb)  Body mass index is 30.04 kg/m².        Physical Exam  Vitals signs and nursing note reviewed.   Constitutional:       Appearance: Normal appearance. He is normal weight.   HENT:      Head: Normocephalic and atraumatic.      Mouth/Throat:      Mouth: Mucous membranes are moist.      Pharynx: Oropharynx is clear.   Neck:      Vascular: No carotid bruit.   Cardiovascular:      Rate and Rhythm: Normal rate and regular rhythm.      Pulses:           Radial pulses are 2+ on the right side and 2+ on the left side.        Femoral pulses are 2+ on the right side and 2+ on the left side.     Comments: Right medial upper arm with localized swelling, non tender, no erythema  Pulmonary:      Effort: Pulmonary effort is normal.      Breath sounds: Normal breath sounds.   Abdominal:      General: Bowel sounds are normal.      Tenderness: There is no abdominal tenderness.   Skin:      General: Skin is warm.      Capillary Refill: Capillary refill takes less than 2 seconds.   Neurological:      General: No focal deficit present.      Mental Status: He is alert and oriented to person, place, and time.         Significant Labs:  CBC:   Recent Labs   Lab 06/18/20  0522   WBC 2.44*   RBC 3.39*   HGB 9.1*   HCT 28.4*   *   MCV 84   MCH 26.8*   MCHC 32.0     CMP:   Recent Labs   Lab 06/18/20  0522      CALCIUM 8.6*   ALBUMIN 2.7*   PROT 6.3      K 3.8   CO2 27   CL 99   BUN 14   CREATININE 1.0   ALKPHOS 56   ALT 16   AST 14   BILITOT 0.4     Coagulation:   Recent Labs   Lab 06/17/20  1625   INR 1.2   APTT 29.1       Significant Diagnostics:  I have reviewed all pertinent imaging results/findings within the past 24 hours.    Assessment/Plan:   Patient has localized swelling right upper arm secondary to DVT. Pt with DVT likely from subclavian vein extending to antecubital fossa secondary to catheter presence and underlying hypercoagulability of malignancy. This is not a thoracic outlet syndrome related to anatomical abnormalities.    Options include anticoagulation vs catheter directed thrombectomy. Since his symptoms are relatively mild and are not involving the entirety of the arm, my recommendations are towards anticoagulation only. The patient is agreeable for this.    AC should be continued for 6 months, though we may consider treating indefinitely due to the underlying cancer diagnosis.    Active Diagnoses:    Diagnosis Date Noted POA    PRINCIPAL PROBLEM:  Subclavian vein thrombosis, right [I82.B11] 06/17/2020 Unknown    Acute on chronic anemia [D64.9] 06/17/2020 Unknown    Malignant neoplasm of upper lobe of right lung [C34.11] 05/12/2020 Yes    Former smoker [Z87.891] 04/21/2020 Not Applicable      Problems Resolved During this Admission:       Thank you for your consult. I will sign off. Please contact us if you have any additional questions.    Ali Khoobehi,  MD  Vascular Surgery  [facility]

## 2020-06-18 NOTE — ANESTHESIA POSTPROCEDURE EVALUATION
Anesthesia Post Evaluation    Patient: Anson Do    Procedure(s) Performed: Procedure(s) (LRB):  EGD (ESOPHAGOGASTRODUODENOSCOPY) (N/A)    Final Anesthesia Type: MAC    Patient location: Holding Area.  Patient participation: Yes- Able to Participate  Level of consciousness: awake and alert, oriented and awake  Post-procedure vital signs: reviewed and stable  Pain management: adequate  Airway patency: patent    PONV status at discharge: No PONV  Anesthetic complications: no      Cardiovascular status: blood pressure returned to baseline, hemodynamically stable and stable  Respiratory status: unassisted, spontaneous ventilation and room air  Hydration status: euvolemic  Follow-up not needed.          Vitals Value Taken Time   /68 06/18/20 1200   Temp 36.9 °C (98.5 °F) 06/18/20 1200   Pulse 52 06/18/20 1200   Resp 20 06/18/20 1200   SpO2 94 % 06/18/20 1200         No case tracking events are documented in the log.      Pain/Sanya Score: No data recorded

## 2020-06-18 NOTE — [employer] PATIENT INSTRUCTIONS
Discharge Summary/Instructions after an Endoscopic Procedure  Patient Name: Anson Do  Patient MRN: [id number]  Patient YOB: 1945 Thursday, June 18, 2020  Nisha Curry MD  RESTRICTIONS:  During your procedure today, you received medications for sedation.  These   medications may affect your judgment, balance and coordination.  Therefore,   for 24 hours, you have the following restrictions:   - DO NOT drive a car, operate machinery, make legal/financial decisions,   sign important papers or drink alcohol.    ACTIVITY:  Today: no heavy lifting, straining or running due to procedural   sedation/anesthesia.  The following day: return to full activity including work.  DIET:  Eat and drink normally unless instructed otherwise.     TREATMENT FOR COMMON SIDE EFFECTS:  - Mild abdominal pain, nausea, belching, bloating or excessive gas:  rest,   eat lightly and use a heating pad.  - Sore Throat: treat with throat lozenges and/or gargle with warm salt   water.  - Because air was used during the procedure, expelling large amounts of air   from your rectum or belching is normal.  - If a bowel prep was taken, you may not have a bowel movement for 1-3 days.    This is normal.  SYMPTOMS TO WATCH FOR AND REPORT TO YOUR PHYSICIAN:  1. Abdominal pain or bloating, other than gas cramps.  2. Chest pain.  3. Back pain.  4. Signs of infection such as: chills or fever occurring within 24 hours   after the procedure.  5. Rectal bleeding, which would show as bright red, maroon, or black stools.   (A tablespoon of blood from the rectum is not serious, especially if   hemorrhoids are present.)  6. Vomiting.  7. Weakness or dizziness.  GO DIRECTLY TO THE NEAREST EMERGENCY ROOM IF YOU HAVE ANY OF THE FOLLOWING:      Difficulty breathing              Chills and/or fever over 101 F   Persistent vomiting and/or vomiting blood   Severe abdominal pain   Severe chest pain   Black, tarry stools   Bleeding- more than one  tablespoon   Any other symptom or condition that you feel may need urgent attention  Your doctor recommends these additional instructions:  If any biopsies were taken, your doctors clinic will contact you in 1 to 2   weeks with any results.  - Await pathology results.   - Use Protonix (pantoprazole) 40 mg PO daily for 3 months.   - Resume Eliquis (apixaban) at prior dose today.  Refer to managing   physician for further adjustment of therapy.   - Resume regular diet today.   - Patient has a contact number available for emergencies.  The signs and   symptoms of potential delayed complications were discussed with the   patient.  Return to normal activities tomorrow.  Written discharge   instructions were provided to the patient.   - Discharge patient to home (with escort).  For questions, problems or results please call your physician - Nisha Curry MD at Work:  [phone].  [facility], EMERGENCY ROOM PHONE NUMBER: [phone]  IF A COMPLICATION OR EMERGENCY SITUATION ARISES AND YOU ARE UNABLE TO REACH   YOUR PHYSICIAN - GO DIRECTLY TO THE EMERGENCY ROOM.  Nisha Curry MD  6/18/2020 3:03:21 PM  This report has been verified and signed electronically.  [employer]

## 2020-06-18 NOTE — ANESTHESIA PREPROCEDURE EVALUATION
06/18/2020  Anson Do is a 74 y.o., male.  Patient Active Problem List   Diagnosis    Chronic rhinitis    Chronic conjunctivitis of both eyes    Pleural effusion    Former smoker    Pulmonary nodule    Malignant neoplasm of upper lobe of right lung    Subclavian vein thrombosis, right    Acute on chronic anemia       Past Surgical History:   Procedure Laterality Date    BACK SURGERY  1975    hernina repair      INSERTION OF PLEURAL CATHETER Right 05/2020    pleur X Cath    INSERTION OF TUNNELED CENTRAL VENOUS CATHETER (CVC) WITH SUBCUTANEOUS PORT N/A 5/20/2020    Procedure: [id number]-X-CATH;  Surgeon: Yoli Rivas MD;  Location: [facility];  Service: General;  Laterality: N/A;    LUMBAR SPINE SURGERY      L3-l4    THORACENTESIS Right 04/2020        Tobacco Use:  The patient  reports that he quit smoking about 31 years ago. He smoked 2.00 packs per day. He does not have any smokeless tobacco history on file.     Results for orders placed or performed during the hospital encounter of 05/01/20   EKG 12-lead    Collection Time: 05/01/20 10:21 AM    Narrative    Test Reason : R06.02    Vent. Rate : 062 BPM     Atrial Rate : 062 BPM     P-R Int : 190 ms          QRS Dur : 092 ms      QT Int : 394 ms       P-R-T Axes : 074 028 048 degrees     QTc Int : 399 ms    Normal sinus rhythm  Normal ECG  When compared with ECG of 01-MAY-2020 10:13,  Sinus rhythm has replaced Ectopic atrial rhythm  The axis Shifted left  Criteria for Lateral infarct are no longer Present  Criteria for Inferior infarct are no longer Present  T wave inversion no longer evident in Inferior leads  Confirmed by Homa GUIDO, Valentine ([id number]) on 5/9/2020 5:02:28 PM    Referred By: VISH   SELF           Confirmed By:Valentine Luther MD             Lab Results   Component Value Date    WBC 2.44 (L) 06/18/2020    HGB  9.1 (L) 06/18/2020    HCT 28.4 (L) 06/18/2020    MCV 84 06/18/2020     (L) 06/18/2020     BMP  Lab Results   Component Value Date     06/18/2020    K 3.8 06/18/2020    CL 99 06/18/2020    CO2 27 06/18/2020    BUN 14 06/18/2020    CREATININE 1.0 06/18/2020    CALCIUM 8.6 (L) 06/18/2020    ANIONGAP 12 06/18/2020    ESTGFRAFRICA >60.0 06/18/2020    EGFRNONAA >60.0 06/18/2020         Pre-op Assessment    I have reviewed the Patient Summary Reports.     I have reviewed the Nursing Notes. I have reviewed the NPO Status.   I have reviewed the Medications.     Review of Systems  Anesthesia Hx:  No problems with previous Anesthesia  Denies Family Hx of Anesthesia complications.   Denies Personal Hx of Anesthesia complications.   Social:  Former Smoker    Hematology/Oncology:         -- Anemia: Current/Recent Cancer. Oncology Comments: Right upper lobe Neoplasm   Right pleural effusion  Daily inhalers at home    EENT/Dental:   chronic allergic rhinitis   Cardiovascular:  Cardiovascular Normal  ECG has been reviewed.    Pulmonary:   Lung Cancer  Right Pleural effusion   Chronic cough    Hepatic/GI:   GERD, well controlled No Active N/V  No Intestinal Obstruction   Musculoskeletal:  Musculoskeletal Normal    Neurological:  Neurology Normal    Endocrine:  Endocrine Normal    Dermatological:  Skin Normal    Psych:  Psychiatric Normal           Physical Exam  General:  Well nourished    Airway/Jaw/Neck:  Airway Findings: Mouth Opening: Normal Tongue: Normal  General Airway Assessment: Adult  Mallampati: III  TM Distance: < 4 cm  Jaw/Neck Findings:  Neck ROM: Normal ROM      Dental:  Dental Findings: Molar caps   Chest/Lungs:  Chest/Lungs Findings: Clear to auscultation, Normal Respiratory Rate     Heart/Vascular:  Heart Findings: Rate: Normal  Rhythm: Regular Rhythm  Sounds: Normal        Mental Status:  Mental Status Findings:  Cooperative, Alert and Oriented         Anesthesia Plan  Type of Anesthesia, risks &  benefits discussed:  Anesthesia Type:  MAC  Patient's Preference: MAC  Intra-op Monitoring Plan: standard ASA monitors  Intra-op Monitoring Plan Comments:   Post Op Pain Control Plan: multimodal analgesia, IV/PO Opioids PRN and per primary service following discharge from PACU  Post Op Pain Control Plan Comments:   Induction:   IV  Beta Blocker:  Patient is not currently on a Beta-Blocker (No further documentation required).       Informed Consent: Patient understands risks and agrees with Anesthesia plan.  Questions answered.   ASA Score: 3     Day of Surgery Review of History & Physical:        Anesthesia Plan Notes: MAC with Propofol  POM Mask        Ready For Surgery From Anesthesia Perspective.

## 2020-06-18 NOTE — DISCHARGE SUMMARY
[facility]  Discharge Summary      Patient Name: Anson Do  MRN: [id number]  Admission Date: 6/17/2020  Hospital Length of Stay: 0 days  Discharge Date and Time:  06/18/2020 3:35 PM  Attending Physician: Alejandro Pacheco MD   Discharging Provider: Alejandro Pacheco MD  Primary Care Provider: Primary Doctor No      HPI:   74 year old patient with recently diagnosed adenocarcinoma getting admitted with DVT of R subclavian vein  Patient has been having pain and Swelling of RUE for past few days  He has R sided Port A Cath  Because of persistence of symptoms pt had USS RUE today which revealed thrombosis  Patient today was also seen by His GI MD today and plan was made to have EGD tomorrow to evaluate anemia  Pt denies any other issues    Procedure(s) (LRB):  EGD (ESOPHAGOGASTRODUODENOSCOPY) (N/A)      Hospital Course:   Patient was admitted with anemia and also subclavian vein thrombosis on the right side.  Patient had previous history of lung cancer on the right and secondary to combination of cancer, chemotherapy and the port .  EGD was done for chronic anemia and no significant bleeding point is identified and patient was later started on Eliquis and discharged in stable condition.  Vascular surgeon was consulted for the possible catheter based thrombectomy.need and did not recommend.  She is to follow-up with GI and oncology in short time.     Consults:   Consults (From admission, onward)        Status Ordering Provider     Inpatient consult to Gastroenterology  Once     Provider:  Nisha Curry MD    Completed YUMIKO GALLAGHER     Inpatient consult to Hematology Oncology  Once     Provider:  Blake Donovan MD    Acknowledged YUMIKO GALLAGHER     Inpatient consult to Vascular Surgery  Once     Provider:  Michael Henderson MD    Completed ALEJANDRO PACHECO          No new Assessment & Plan notes have been filed under this hospital service since the last note was generated.  Service: Hospital  Medicine    Final Active Diagnoses:    Diagnosis Date Noted POA    PRINCIPAL PROBLEM:  Subclavian vein thrombosis, right [I82.B11] 06/17/2020 Unknown    Acute on chronic anemia [D64.9] 06/17/2020 Unknown    Malignant neoplasm of upper lobe of right lung [C34.11] 05/12/2020 Yes    Former smoker [Z87.891] 04/21/2020 Not Applicable      Problems Resolved During this Admission:       Discharged Condition: good    Disposition: Home or Self Care    Follow Up:  Follow-up Information     Nisha Curry MD In 4 weeks.    Specialty: Gastroenterology  Contact information:  [address]  [place] [address]  [phone]             Blake Hartman MD In 2 weeks.    Specialties: Hematology, Oncology, Hematology and Oncology  Contact information:  [address]  [address]  [place] [address]  [phone]                 Patient Instructions:      Diet Adult Regular     Notify your health care provider if you experience any of the following:  severe uncontrolled pain     Activity as tolerated       Significant Diagnostic Studies: Labs:   CMP   Recent Labs   Lab 06/17/20  1625 06/18/20  0522    138   K 3.7 3.8   CL 96 99   CO2 29 27    103   BUN 16 14   CREATININE 1.3 1.0   CALCIUM 8.7 8.6*   PROT 7.0 6.3   ALBUMIN 3.0* 2.7*   BILITOT 0.5 0.4   ALKPHOS 67 56   AST 16 14   ALT 17 16   ANIONGAP 11 12   ESTGFRAFRICA >60.0 >60.0   EGFRNONAA 53.8* >60.0    and INR   Lab Results   Component Value Date    INR 1.2 06/17/2020    INR 1.2 05/29/2020    INR 1.4 05/04/2020       Pending Diagnostic Studies:     None         Medications:  Reconciled Home Medications:      Medication List      START taking these medications    apixaban 5 mg Tab  Commonly known as: ELIQUIS  Take 1 tablet (5 mg total) by mouth 2 (two) times daily.     pantoprazole 40 MG tablet  Commonly known as: PROTONIX  Take 1 tablet (40 mg total) by mouth once daily.  Start taking on: June 19, 2020        CONTINUE taking these medications     albuterol 90 mcg/actuation inhaler  Commonly known as: PROVENTIL/VENTOLIN HFA  Inhale 2 puffs into the lungs every 4 to 6 hours as needed for Shortness of Breath. Rescue     folic acid 1 MG tablet  Commonly known as: FOLVITE  Take 1 tablet (1 mg total) by mouth once daily.     ondansetron 8 MG tablet  Commonly known as: ZOFRAN  Take 1 tablet (8 mg total) by mouth every 8 (eight) hours as needed for Nausea.        STOP taking these medications    dexAMETHasone 4 MG Tab  Commonly known as: DECADRON     HYDROcodone-acetaminophen 5-325 mg per tablet  Commonly known as: NORCO            Indwelling Lines/Drains at time of discharge:   Lines/Drains/Airways     Central Venous Catheter Line            Port A Cath Single Lumen 05/20/20  right subclavian 29 days          Drain                 Closed/Suction Drain 05/04/20 1345 Right;Lateral Chest Other (Comment) 45 days              Physical Exam:  General- Patient alert and oriented   HEENT- PERRLA, EOMI, OP clear, MMM  Neck- No JVD, Lymphadenopathy, Thyromegaly  CV- Regular rate and rhythm, No Murmur/sola/rubs  Resp- Lungs CTA Bilaterally, No increased WOB  GI- Non tender/non-distended,  Extrem- No cyanosis, clubbing, edema.  Neuro- Strength 5/5 flexors/extensors,  Skin-  No masses, rashes or lesions noted on cursory skin exam.  Time spent on the discharge of patient: 20 minutes  Patient was seen and examined on the date of discharge and determined to be suitable for discharge.         Kenney Pacheco MD  Department of Hospital Medicine  [facility]

## 2020-06-18 NOTE — HPI
4/20/2020:  Patient presents to [facility] with cough and sob and right pleural effusion.      4/21/2020:  Thoracentesis 2L fluid. Path c/w Adenocarcinoma. CTA shows RUL nodular density.      5/4/2020:  Right pleurex cath placed.     5/29/2020:  Needle biopsy of right lung lesion: adenocarcinoma    Mr. Do is a 73yo male with a recent diagnosis of stage IV adenocarcinoma of the lung.  Patient has started treatment with chemoimmunotherapy and is s/p cycle 1 of carboplatin/pemetrexed/pembrolizumab on 6/8/2020.  He tolerated this fairly well and was doing fine until approximately 10 days ago when he noticed swelling of the LUE.  This continued to worsen over since that time ultimately prompting him to see Dr. Jones who ordered an U/S of the arm.  This U/S did show a deep venous thrombosis.  He has had no sob or chest pain during this time.      He has also been worked up for anemia and was to have an EGD per Dr. Curry tomorrow.  She was consulted from the ED and will perform this today.  He has had no melena or brbpr.  He was given on injection of Lovenox last night.

## 2020-06-18 NOTE — PLAN OF CARE
06/18/20 0812   Patient Assessment/Suction   Level of Consciousness (AVPU) alert   Respiratory Effort Normal;Unlabored   Expansion/Accessory Muscles/Retractions no use of accessory muscles;no retractions   All Lung Fields Breath Sounds clear   Rhythm/Pattern, Respiratory unlabored;pattern regular;depth regular   PRE-TX-O2   O2 Device (Oxygen Therapy) room air   SpO2 95 %   Pulse Oximetry Type Intermittent   $ Pulse Oximetry - Multiple Charge Pulse Oximetry - Multiple   Pulse (!) 51   Resp 18   Inhaler   $ Inhaler Charges PRN treatment not required   Respiratory Evaluation   $ Care Plan Tech Time 15 min

## 2020-06-18 NOTE — ASSESSMENT & PLAN NOTE
Patient has only recently started chemotherapy for this.  At this point he is not due for another 2 weeks for therapy and I suspect he will be able to stay on schedule.  Will continue to monitor clinical situation for changes however.

## 2020-06-18 NOTE — PLAN OF CARE
06/18/20 1530   Final Note   Assessment Type Final Discharge Note   Anticipated Discharge Disposition Home     SW reviewed d/c orders - no CM needs noted.

## 2020-06-18 NOTE — CONSULTS
[facility]  Gastroenterology  Consult Note    Patient Name: Anson Do  MRN: [id number]  Admission Date: 6/17/2020  Hospital Length of Stay: 0 days  Code Status: Full Code   Attending Provider: Kenney Pacheco MD   Consulting Provider: Nisha Curry MD  Primary Care Physician: No primary care provider on file.  Principal Problem:Subclavian vein thrombosis, right    Consults  Subjective:     HPI:  74 year old patient with recently diagnosed adenocarcinoma getting admitted with DVT of R subclavian vein  Patient has been having pain and Swelling of RUE for past few days  He has R sided Port A Cath  Because of persistence of symptoms pt had USS RUE today which revealed thrombosis  Patient today was also seen by His GI MD today and plan was made to have EGD tomorrow to evaluate anemia  Pt denies any other issues    Last EGD 8/19 - inlet patch.  Mild erosive gastritis  Colonoscopy 8/19 - diverticulosis. Small polyp.    Patient was seen in clinic yesterday for routine visit.  He had seen dr. allison earlier that day for arm pain and he had ordered us with suspicion for DVT.  Patient denies melena/ hematochezia.    Past Medical History:   Diagnosis Date    Adenocarcinoma of lung 04/2020    Back pain     GERD (gastroesophageal reflux disease)     Pleural effusion on right 04/2020       Past Surgical History:   Procedure Laterality Date    BACK SURGERY  1975    hernina repair      INSERTION OF PLEURAL CATHETER Right 05/2020    pleur X Cath    INSERTION OF TUNNELED CENTRAL VENOUS CATHETER (CVC) WITH SUBCUTANEOUS PORT N/A 5/20/2020    Procedure: [id number];  Surgeon: Yoli Rivas MD;  Location: [facility];  Service: General;  Laterality: N/A;    LUMBAR SPINE SURGERY      L3-l4    THORACENTESIS Right 04/2020       Review of patient's allergies indicates:  No Known Allergies  Family History     None        Tobacco Use    Smoking status: Former Smoker     Packs/day: 2.00     Quit date:  1988     Years since quittin.9   Substance and Sexual Activity    Alcohol use: Never     Frequency: Never    Drug use: Not on file    Sexual activity: Not on file     Review of Systems   GENERAL: No fever, chills, weight loss  SKIN: No rashes, jaundice, pruritus  HEENT: No rhinorrhea, epistaxis, vision changes.  No trauma, tinnitus, lymphadenopathy or pharyngitis  CV: No chest pain, palpitations, edima or NINO  PULM: No cough or sputum production.  No wheezing.  GI: AS IN HPI  URINARY:  No hematuria, dysuria  MS: No change in muscle or joint pain, weakness, or ROM  Neuro: No focal neurologic changes  PSYCH: No change in mood or personality.  No suicidal ideation.  ENDOCRINE: No fatigue  Objective:     Vital Signs (Most Recent):  Temp: 98.4 °F (36.9 °C) (20 0400)  Pulse: (!) 54 (20 0400)  Resp: 16 (20 0400)  BP: 111/64 (20 0400)  SpO2: 96 % (20 0400) Vital Signs (24h Range):  Temp:  [98.3 °F (36.8 °C)-98.5 °F (36.9 °C)] 98.4 °F (36.9 °C)  Pulse:  [51-62] 54  Resp:  [16-18] 16  SpO2:  [93 %-96 %] 96 %  BP: (107-132)/(59-77) 111/64     Weight: 106.1 kg (234 lb) (20 1916)  Body mass index is 30.04 kg/m².      Intake/Output Summary (Last 24 hours) at 2020 0737  Last data filed at 2020 0429  Gross per 24 hour   Intake 800 ml   Output --   Net 800 ml       Lines/Drains/Airways     Central Venous Catheter Line            Port A Cath Single Lumen 20  right subclavian 29 days          Drain                 Closed/Suction Drain 20 1345 Right;Lateral Chest Other (Comment) 44 days          Peripheral Intravenous Line                 Peripheral IV - Single Lumen 20 1620 20 G Left Forearm less than 1 day                Physical Exam  Physical Exam:  General- Patient alert and oriented x3 in NAD  HEENT- PERRLA, EOMI, OP clear, MMM  Neck- No JVD, Lymphadenopathy, Thyromegaly  CV- Regular rate and rhythm, No Murmur/sola/rubs  Resp- Lungs CTA Bilaterally, No  increased WOB  GI- Non tender/non-distended, BS normoactive x4 quads, no HSM  Extrem- No cyanosis, clubbing, edema. Pulses 2+ and symmetric  Neuro- Strength 5/5 flexors/extensors, DTRs 2+ and symmetric, Intact sensation to light touch grossly   Significant Labs:  CBC:   Recent Labs   Lab 06/17/20  1325 06/18/20  0522   WBC 2.97* 2.44*   HGB 9.5* 9.1*   HCT 28.9* 28.4*   * 126*     CMP:   Recent Labs   Lab 06/18/20  0522      CALCIUM 8.6*   ALBUMIN 2.7*   PROT 6.3      K 3.8   CO2 27   CL 99   BUN 14   CREATININE 1.0   ALKPHOS 56   ALT 16   AST 14   BILITOT 0.4       Significant Imaging:  Imaging results within the past 24 hours have been reviewed.    Assessment/Plan:     Active Diagnoses:    Diagnosis Date Noted POA    PRINCIPAL PROBLEM:  Subclavian vein thrombosis, right [I82.B11] 06/17/2020 Unknown    Acute on chronic anemia [D64.9] 06/17/2020 Unknown    Malignant neoplasm of upper lobe of right lung [C34.11] 05/12/2020 Yes    Former smoker [Z87.891] 04/21/2020 Not Applicable      Problems Resolved During this Admission:       Right lung adenocarcinoma  Anemia  Right persistent pleural effusion      egd today    Thank you for your consult. I will follow-up with patient. Please contact us if you have any additional questions.    Nisha Curry MD  Gastroenterology  [facility]

## 2020-06-18 NOTE — TRANSFER OF CARE
"Anesthesia Transfer of Care Note    Patient: Anson Do    Procedure(s) Performed: Procedure(s) (LRB):  EGD (ESOPHAGOGASTRODUODENOSCOPY) (N/A)    Patient location: GI    Anesthesia Type: MAC    Post pain: adequate analgesia    Post assessment: no apparent anesthetic complications    Post vital signs: stable    Level of consciousness: awake and alert    Nausea/Vomiting: no nausea/vomiting    Complications: none    Transfer of care protocol was followed      Last vitals:   Visit Vitals  /68   Pulse (!) 52   Temp 36.9 °C (98.5 °F) (Oral)   Resp 20   Ht 6' 2" (1.88 m)   Wt 106.1 kg (234 lb)   SpO2 (!) 94%   BMI 30.04 kg/m²     "

## 2020-06-22 NOTE — PROGRESS NOTES
PROGRESS NOTE    Subjective:       Patient ID: Anson Do is a 74 y.o. male.    4/20/2020:  Patient presents to [facility] with cough and sob and right pleural effusion.      4/21/2020:  Thoracentesis 2L fluid. Path c/w Adenocarcinoma. CTA shows RUL nodular density.      5/4/2020:  Right pleurex cath placed.    5/29/2020:  Needle biopsy of right lung lesion: adenocarcinoma    Chief Complaint:  Lung cancer follow up. /Right great toe Gout    History of Present Illness:   Anson Do is a 74 y.o. male who presents for follow up of lung cancer/RUE DVT. Patient was admitted to [facility] for RUE DVT on 5/17/2020 and continues on Eliquis 5 mg BID. He is due for cycle #2 Carbo/Alimta/Keytruda on 6/29/2020.  He denies any CP, SOB, NINO, fever, cough, n/v or diarrhea. He states he had not missed any doses of Eliquis. He has had pain and swelling in right lower extremity/  great toe/top of foot x 2 days. He has tried Black Cherry juice. He has taken Colchicine in the past with some relief. He denies any alcohol intake or shellfish. Reminded the patient of a low Purine diet.    Some constipation and is taking Miralax.      Family and Social history reviewed and is unchanged from 5/12/2020      ROS:  Review of Systems   Constitutional: Negative for appetite change, fever and unexpected weight change.   HENT: Negative for nosebleeds.    Eyes: Negative for visual disturbance.   Respiratory: Negative for cough, chest tightness and shortness of breath.    Cardiovascular: Positive for leg swelling (right lower extremity). Negative for chest pain and palpitations.   Gastrointestinal: Negative for abdominal pain, blood in stool and diarrhea.   Genitourinary: Negative for frequency and hematuria.   Musculoskeletal: Negative for back pain.   Skin: Negative for rash.   Neurological: Negative for headaches.   Hematological: Negative for adenopathy. Does not bruise/bleed easily.    Psychiatric/Behavioral: The patient is not nervous/anxious.           Current Outpatient Medications:     albuterol (PROVENTIL/VENTOLIN HFA) 90 mcg/actuation inhaler, Inhale 2 puffs into the lungs every 4 to 6 hours as needed for Shortness of Breath. Rescue, Disp: 18 g, Rfl: 3    apixaban (ELIQUIS) 5 mg Tab, Take 1 tablet (5 mg total) by mouth 2 (two) times daily., Disp: 30 tablet, Rfl: 2    folic acid (FOLVITE) 1 MG tablet, Take 1 tablet (1 mg total) by mouth once daily., Disp: 100 tablet, Rfl: 3    ondansetron (ZOFRAN) 8 MG tablet, Take 1 tablet (8 mg total) by mouth every 8 (eight) hours as needed for Nausea., Disp: 30 tablet, Rfl: 5    pantoprazole (PROTONIX) 40 MG tablet, Take 1 tablet (40 mg total) by mouth once daily., Disp: 30 tablet, Rfl: 2    predniSONE (DELTASONE) 20 MG tablet, Take 1 tablet (20 mg total) by mouth once daily., Disp: 5 tablet, Rfl: 0    Current Facility-Administered Medications:     cyanocobalamin injection 1,000 mcg, 1,000 mcg, Subcutaneous, Q30 Days, Blake Donovan MD, 1,000 mcg at 06/08/20 1034        Objective:       Physical Examination:     /72 (BP Location: Left arm, Patient Position: Sitting)   Pulse 60   Temp 97.9 °F (36.6 °C) (Oral)   Resp 12   Wt 109.9 kg (242 lb 3.2 oz)   BMI 31.10 kg/m²     Physical Exam  Vitals signs reviewed.   Constitutional:       Appearance: He is well-developed.   HENT:      Head: Normocephalic and atraumatic.      Right Ear: External ear normal.      Left Ear: External ear normal.   Eyes:      General: No scleral icterus.     Conjunctiva/sclera: Conjunctivae normal.      Pupils: Pupils are equal, round, and reactive to light.   Neck:      Musculoskeletal: Normal range of motion and neck supple.   Cardiovascular:      Rate and Rhythm: Normal rate and regular rhythm.      Heart sounds: Normal heart sounds. No murmur. No friction rub. No gallop.    Pulmonary:      Effort: Pulmonary effort is normal. No respiratory distress.       Breath sounds: Normal breath sounds. No rales.   Chest:      Chest wall: No tenderness.   Abdominal:      General: Bowel sounds are normal. There is no distension.      Palpations: Abdomen is soft. There is no mass.      Tenderness: There is no abdominal tenderness. There is no guarding or rebound.   Musculoskeletal:         General: Swelling (right leg swelling and calf pain) present.      Right lower leg: Edema (right foot redness and warmth) present.   Lymphadenopathy:      Head:      Right side of head: No tonsillar adenopathy.      Left side of head: No tonsillar adenopathy.      Cervical: No cervical adenopathy.      Upper Body:      Right upper body: No supraclavicular adenopathy.      Left upper body: No supraclavicular adenopathy.   Skin:     General: Skin is warm and dry.   Neurological:      Mental Status: He is alert and oriented to person, place, and time.   Psychiatric:         Mood and Affect: Mood normal.         Behavior: Behavior normal.         Thought Content: Thought content normal.         Judgment: Judgment normal.         Labs:   Recent Results (from the past 336 hour(s))   CBC with Automated Differential    Collection Time: 06/18/20  5:22 AM   Result Value Ref Range    WBC 2.44 (L) 3.90 - 12.70 K/uL    Hemoglobin 9.1 (L) 14.0 - 18.0 g/dL    Hematocrit 28.4 (L) 40.0 - 54.0 %    Platelets 126 (L) 150 - 350 K/uL   CBC auto differential    Collection Time: 06/17/20  1:25 PM   Result Value Ref Range    WBC 2.97 (L) 3.90 - 12.70 K/uL    Hemoglobin 9.5 (L) 14.0 - 18.0 g/dL    Hematocrit 28.9 (L) 40.0 - 54.0 %    Platelets 144 (L) 150 - 350 K/uL     CMP  Sodium   Date Value Ref Range Status   06/18/2020 138 136 - 145 mmol/L Final     Potassium   Date Value Ref Range Status   06/18/2020 3.8 3.5 - 5.1 mmol/L Final     Chloride   Date Value Ref Range Status   06/18/2020 99 95 - 110 mmol/L Final     CO2   Date Value Ref Range Status   06/18/2020 27 23 - 29 mmol/L Final     Glucose   Date Value Ref Range  Status   06/18/2020 103 70 - 110 mg/dL Final     BUN, Bld   Date Value Ref Range Status   06/18/2020 14 8 - 23 mg/dL Final     Creatinine   Date Value Ref Range Status   06/18/2020 1.0 0.5 - 1.4 mg/dL Final     Calcium   Date Value Ref Range Status   06/18/2020 8.6 (L) 8.7 - 10.5 mg/dL Final     Total Protein   Date Value Ref Range Status   06/18/2020 6.3 6.0 - 8.4 g/dL Final     Albumin   Date Value Ref Range Status   06/18/2020 2.7 (L) 3.5 - 5.2 g/dL Final     Total Bilirubin   Date Value Ref Range Status   06/18/2020 0.4 0.1 - 1.0 mg/dL Final     Comment:     For infants and newborns, interpretation of results should be based  on gestational age, weight and in agreement with clinical  observations.  Premature Infant recommended reference ranges:  Up to 24 hours.............<8.0 mg/dL  Up to 48 hours............<12.0 mg/dL  3-5 days..................<15.0 mg/dL  6-29 days.................<15.0 mg/dL       Alkaline Phosphatase   Date Value Ref Range Status   06/18/2020 56 55 - 135 U/L Final     AST   Date Value Ref Range Status   06/18/2020 14 10 - 40 U/L Final     ALT   Date Value Ref Range Status   06/18/2020 16 10 - 44 U/L Final     Anion Gap   Date Value Ref Range Status   06/18/2020 12 8 - 16 mmol/L Final     eGFR if    Date Value Ref Range Status   06/18/2020 >60.0 >60 mL/min/1.73 m^2 Final     eGFR if non    Date Value Ref Range Status   06/18/2020 >60.0 >60 mL/min/1.73 m^2 Final     Comment:     Calculation used to obtain the estimated glomerular filtration  rate (eGFR) is the CKD-EPI equation.        No results found for: CEA  Lab Results   Component Value Date    PSA 0.53 05/14/2010    PSA 0.44 12/16/2008    PSA 0.3 11/03/2007           Assessment/Plan:     Problem List Items Addressed This Visit        Hematology    Subclavian vein thrombosis, right    Relevant Orders    US RLE VENOUS (Completed)      Other Visit Diagnoses     Gout, unspecified cause, unspecified  chronicity, unspecified site    -  Primary    Relevant Medications    predniSONE (DELTASONE) 20 MG tablet    Swelling        Relevant Orders    US RLE VENOUS (Completed)    Right calf pain        Relevant Orders    US RLE VENOUS (Completed)          Discussion:     Follow up in about 3 days (around 6/25/2020) for with Dr. Donovan.      Electronically signed by Love Mckeon, MSN, APRN, AGNP-C, OCN

## 2020-06-25 NOTE — PROGRESS NOTES
PROGRESS NOTE    Subjective:       Patient ID: Anson Do is a 74 y.o. male.    4/20/2020:  Patient presents to [facility] with cough and sob and right pleural effusion.      4/21/2020:  Thoracentesis 2L fluid. Path c/w Adenocarcinoma. CTA shows RUL nodular density.      5/4/2020:  Right pleurex cath placed.    5/29/2020:  Needle biopsy of right lung lesion: adenocarcinoma    6/17/2020:  Dx with RUE DVT, placed on Eliquis    Chief Complaint:  No chief complaint on file.  Lung cancer follow up.     History of Present Illness:   Anson Do is a 74 y.o. male who presents for follow up of lung cancer.  He is here to review the results of the [facility] testing. Results show PDL 1 positive at 10% and a benefit for Pembrolizumab. Patient started treatment on Carbbo/Alimta and Keytruda on 6/8/2020. Will continue on this regimen.    Other testing ALK, AGFR, ROS 1- Insufficient tumor to run these test. MMR is proficient and BRAF V600E negative.    Patients gout has resolved, though he still has some right lower ext edema. RLE US was negative for a DVT and the patient continues on Eliquis.  Carbo/PEM/PEM   Cycle 1: 6/8/2020  Cycle 2: 6/29/2020-due  Cycle 3: 7/20/2020-due    Family and Social history reviewed and is unchanged from 5/12/2020      ROS:  Review of Systems   Constitutional: Negative for appetite change, fever and unexpected weight change.   HENT: Negative for congestion and nosebleeds.    Eyes: Negative for visual disturbance.   Respiratory: Negative for cough, chest tightness and shortness of breath.    Cardiovascular: Positive for leg swelling. Negative for chest pain.   Gastrointestinal: Negative for abdominal pain, blood in stool and diarrhea.   Genitourinary: Negative for frequency and hematuria.   Musculoskeletal: Negative for back pain.   Skin: Negative for rash.   Neurological: Negative for headaches.   Hematological: Negative for adenopathy. Does not  bruise/bleed easily (RLE 2+).   Psychiatric/Behavioral: The patient is not nervous/anxious.           Current Outpatient Medications:     albuterol (PROVENTIL/VENTOLIN HFA) 90 mcg/actuation inhaler, Inhale 2 puffs into the lungs every 4 to 6 hours as needed for Shortness of Breath. Rescue, Disp: 18 g, Rfl: 3    apixaban (ELIQUIS) 5 mg Tab, Take 1 tablet (5 mg total) by mouth 2 (two) times daily., Disp: 30 tablet, Rfl: 2    folic acid (FOLVITE) 1 MG tablet, Take 1 tablet (1 mg total) by mouth once daily., Disp: 100 tablet, Rfl: 3    ondansetron (ZOFRAN) 8 MG tablet, Take 1 tablet (8 mg total) by mouth every 8 (eight) hours as needed for Nausea., Disp: 30 tablet, Rfl: 5    pantoprazole (PROTONIX) 40 MG tablet, Take 1 tablet (40 mg total) by mouth once daily., Disp: 30 tablet, Rfl: 2    predniSONE (DELTASONE) 20 MG tablet, Take 1 tablet (20 mg total) by mouth once daily., Disp: 5 tablet, Rfl: 0    Current Facility-Administered Medications:     cyanocobalamin injection 1,000 mcg, 1,000 mcg, Subcutaneous, Q30 Days, Blake Donovan MD, 1,000 mcg at 06/08/20 1034        Objective:       Physical Examination:     BP 98/63 (BP Location: Left arm, Patient Position: Sitting)   Pulse (!) 52   Temp 98.5 °F (36.9 °C) (Oral)   Resp 16   Wt 111.1 kg (244 lb 14.4 oz)   BMI 31.44 kg/m²     Physical Exam  Vitals signs reviewed.   Constitutional:       Appearance: He is well-developed.   HENT:      Head: Normocephalic and atraumatic.      Right Ear: External ear normal.      Left Ear: External ear normal.   Eyes:      General: No scleral icterus.     Conjunctiva/sclera: Conjunctivae normal.      Pupils: Pupils are equal, round, and reactive to light.   Neck:      Musculoskeletal: Normal range of motion and neck supple.   Cardiovascular:      Rate and Rhythm: Normal rate and regular rhythm.      Heart sounds: Normal heart sounds. No murmur. No friction rub. No gallop.    Pulmonary:      Effort: Pulmonary effort is  normal. No respiratory distress.      Breath sounds: Normal breath sounds. No rales.   Chest:      Chest wall: No tenderness.   Abdominal:      General: Bowel sounds are normal. There is no distension.      Palpations: Abdomen is soft. There is no mass.      Tenderness: There is no abdominal tenderness. There is no guarding or rebound.   Musculoskeletal:      Right lower leg: Edema (2+) present.   Lymphadenopathy:      Head:      Right side of head: No tonsillar adenopathy.      Left side of head: No tonsillar adenopathy.      Cervical: No cervical adenopathy.      Upper Body:      Right upper body: No supraclavicular adenopathy.      Left upper body: No supraclavicular adenopathy.   Neurological:      Mental Status: He is alert and oriented to person, place, and time.   Psychiatric:         Mood and Affect: Mood normal.         Behavior: Behavior normal.         Thought Content: Thought content normal.         Judgment: Judgment normal.         Labs:   Recent Results (from the past 336 hour(s))   CBC with Automated Differential    Collection Time: 06/18/20  5:22 AM   Result Value Ref Range    WBC 2.44 (L) 3.90 - 12.70 K/uL    Hemoglobin 9.1 (L) 14.0 - 18.0 g/dL    Hematocrit 28.4 (L) 40.0 - 54.0 %    Platelets 126 (L) 150 - 350 K/uL   CBC auto differential    Collection Time: 06/17/20  1:25 PM   Result Value Ref Range    WBC 2.97 (L) 3.90 - 12.70 K/uL    Hemoglobin 9.5 (L) 14.0 - 18.0 g/dL    Hematocrit 28.9 (L) 40.0 - 54.0 %    Platelets 144 (L) 150 - 350 K/uL     CMP  Sodium   Date Value Ref Range Status   06/18/2020 138 136 - 145 mmol/L Final     Potassium   Date Value Ref Range Status   06/18/2020 3.8 3.5 - 5.1 mmol/L Final     Chloride   Date Value Ref Range Status   06/18/2020 99 95 - 110 mmol/L Final     CO2   Date Value Ref Range Status   06/18/2020 27 23 - 29 mmol/L Final     Glucose   Date Value Ref Range Status   06/18/2020 103 70 - 110 mg/dL Final     BUN, Bld   Date Value Ref Range Status   06/18/2020  14 8 - 23 mg/dL Final     Creatinine   Date Value Ref Range Status   06/18/2020 1.0 0.5 - 1.4 mg/dL Final     Calcium   Date Value Ref Range Status   06/18/2020 8.6 (L) 8.7 - 10.5 mg/dL Final     Total Protein   Date Value Ref Range Status   06/18/2020 6.3 6.0 - 8.4 g/dL Final     Albumin   Date Value Ref Range Status   06/18/2020 2.7 (L) 3.5 - 5.2 g/dL Final     Total Bilirubin   Date Value Ref Range Status   06/18/2020 0.4 0.1 - 1.0 mg/dL Final     Comment:     For infants and newborns, interpretation of results should be based  on gestational age, weight and in agreement with clinical  observations.  Premature Infant recommended reference ranges:  Up to 24 hours.............<8.0 mg/dL  Up to 48 hours............<12.0 mg/dL  3-5 days..................<15.0 mg/dL  6-29 days.................<15.0 mg/dL       Alkaline Phosphatase   Date Value Ref Range Status   06/18/2020 56 55 - 135 U/L Final     AST   Date Value Ref Range Status   06/18/2020 14 10 - 40 U/L Final     ALT   Date Value Ref Range Status   06/18/2020 16 10 - 44 U/L Final     Anion Gap   Date Value Ref Range Status   06/18/2020 12 8 - 16 mmol/L Final     eGFR if    Date Value Ref Range Status   06/18/2020 >60.0 >60 mL/min/1.73 m^2 Final     eGFR if non    Date Value Ref Range Status   06/18/2020 >60.0 >60 mL/min/1.73 m^2 Final     Comment:     Calculation used to obtain the estimated glomerular filtration  rate (eGFR) is the CKD-EPI equation.        No results found for: CEA  Lab Results   Component Value Date    PSA 0.53 05/14/2010    PSA 0.44 12/16/2008    PSA 0.3 11/03/2007           Assessment/Plan:     Problem List Items Addressed This Visit        Hematology    Subclavian vein thrombosis, right       Oncology    Malignant neoplasm of upper lobe of right lung - Primary      Other Visit Diagnoses     Gout, unspecified cause, unspecified chronicity, unspecified site              Discussion:     Follow up in about 3  weeks (around 7/16/2020) for with Dr. Donovan.      Electronically signed by Love Mckeon, MSN, APRN,AGNP-C, OCN

## 2020-06-29 NOTE — PLAN OF CARE
Problem: Fatigue  Goal: Improved Activity Tolerance  Outcome: Ongoing, Progressing  Intervention: Promote Energy Conservation  Flowsheets (Taken 6/29/2020 1035)  Fatigue Management: frequent rest breaks encouraged  Sleep/Rest Enhancement:   reading promoted   noise level reduced  Activity Management:   ambulated - L4   activity encouraged

## 2020-07-07 NOTE — PATIENT INSTRUCTIONS
Pleural Effusion     Pleural effusion is fluid buildup between the layers of tissue that line the outside of the lungs.   Your healthcare provider has told you that you have pleural effusion. Pleural effusion means that you have extra fluid between the pleura. This area is called the pleural space. The pleura are 2 layers of thin, smooth tissue that surround the lungs and line the chest. The pleural space usually holds only a small amount of fluid. This fluid lubricates the pleura. But if too much fluid fills the space, it can make it hard or painful to breathe.  There are 2 types of pleural effusion:  · Inflammatory. This is caused by a lung disease like pneumonia or lung cancer, both of which irritates the pleura.  · Noninflammatory. This is caused by abnormal fluid pressures inside the lungs. The pressure can be caused by congestive heart failure (CHF). In CHF, extra fluid collects inside the lung tissues because of a weak heart muscle. This extra fluid then leaks into the pleural space. Other causes of noninflammatory pleural effusions include kidney disease, liver disease, and malnutrition.  What are the symptoms of pleural effusion?  The symptoms of pleural effusion include:  · Sharp pains in the chest, especially when taking a breath, coughing, or sneezing  · Trouble breathing  · Cough  · Fever  What are the causes of pleural effusion?  Common causes include:  · Congestive heart failure  · Cirrhosis of the liver  · Pneumonia  · Viral lung infection  · Cancer  · Blood clot in the lung (pulmonary embolism)  · Heart surgery  Chest infections like pneumonia and heart disease are the most common causes. Less common causes include lung cancer.  How is pleural effusion diagnosed?  Your healthcare provider will examine you and ask about your health history. Tests include:  · Blood tests  · Analysis of fluid in pleural space, chest X-ray, CT scan, or ultrasound  How is pleural effusion treated?  The extra fluid may  be drained from the pleural space. This is done with a procedure called thoracentesis. This procedure uses a thin needle to draw out the fluid from the pleural space. In some cases, a tube is placed in the chest to drain the extra fluid. The tube will likely stay in place for several days.  You may have other treatments, depending on the cause of your pleural effusion. If its because of a bacterial infection, you will be given antibiotics to fight the infection. If its because of a heart condition, you will be given medicines and other treatment for your heart. Your healthcare provider can tell you more about the cause of your pleural effusion and your treatment choices.  What are the long-term concerns?  If untreated, pleural effusion can lead to serious health problems, such as collapsed lung from fluid filling the pleural space.     Call 911  Call 911 if you have:  · Trouble breathing  · Worsening chest pain   When to call your healthcare provider   Call your healthcare provider right away if you have:  · Continued coughing  · Fever  Date Last Reviewed: 12/1/2016  © 0167-8950 [employer]. [address] [place] [address], [place] [address]. All rights reserved. This information is not intended as a substitute for professional medical care. Always follow your healthcare professional's instructions.

## 2020-07-07 NOTE — PROGRESS NOTES
[facility]  Pulmonology  Follow-Up Clinic Visit    Subjective:     Reason for Visit:    Anson Do is a 75 y.o. male followed for right sided malignant pleural effusion.    Chief Complaint   Patient presents with    Pleural Effusion     FU      05/07/2020:  A PleurX catheter placed on Monday.  Doing well since then.  Drained approximately 200 cc of pleural fluid today.  Cough is improving.  No new complaints.  As an appoint with Dr. Vilchis next week.  Awaiting insurance approval for his PET/CT.    7/72020:  Doing well since our last visit.  Started on chemotherapy and has received 2 cycles.  Dyspnea has resolved.  Hasn't drained his pleurex in 3 weeks.     Review of Systems   Constitutional: Negative for fever, chills and night sweats.   HENT: Negative for postnasal drip, rhinorrhea and sinus pressure.    Eyes: Negative for redness and itching.   Respiratory: Negative for hemoptysis, sputum production, choking, shortness of breath, orthopnea, pleurisy and dyspnea on extertion.    Cardiovascular: Negative for chest pain, palpitations and leg swelling.   Genitourinary: Negative for difficulty urinating and hematuria.   Endocrine: Negative for polydipsia, polyphagia and polyuria.    Musculoskeletal: Negative for gait problem, joint swelling and myalgias.   Skin: Negative for rash.   Gastrointestinal: Negative for nausea and abdominal pain.   Neurological: Negative for syncope, weakness and headaches.   Hematological: Negative for adenopathy. Does not bruise/bleed easily and no excessive bruising.   Psychiatric/Behavioral: Negative for confusion and sleep disturbance. The patient is not nervous/anxious.       Outpatient Medications as of 7/7/2020   Medication    albuterol (PROVENTIL/VENTOLIN HFA) 90 mcg/actuation inhaler    apixaban (ELIQUIS) 5 mg Tab    folic acid (FOLVITE) 1 MG tablet    ondansetron (ZOFRAN) 8 MG tablet    pantoprazole (PROTONIX) 40 MG tablet    predniSONE (DELTASONE) 20 MG  tablet     Facility-Administered Medications as of 7/7/2020   Medication Route Frequency    cyanocobalamin injection 1,000 mcg Subcutaneous Q30 Days      Previous Reports Reviewed:   ER records, historical medical records, lab reports, nursing home notes, office notes, operative reports, radiology reports, referral letter/letters and x-ray reports   The following portions of the patient's history were reviewed and updated as appropriate: allergies, current medications, past family history, past medical history, past social history, past surgical history and problem list.      Objective:     BP (P) 110/76   Pulse (P) 86   Temp (P) 98.2 °F (36.8 °C)   Wt (P) 107 kg (236 lb)   SpO2 (!) (P) 94%   BMI (P) 30.30 kg/m²   Body mass index is 30.3 kg/m² (pended).     Physical Exam   Constitutional: He is oriented to person, place, and time. He appears well-developed and well-nourished. No distress.   HENT:   Head: Normocephalic.   Nose: Nose normal.   Mouth/Throat: Oropharynx is clear and moist. Mallampati Score: II.   Neck: Normal range of motion. Neck supple. No JVD present. No tracheal deviation present. No thyromegaly present.   Cardiovascular: Normal rate, regular rhythm and normal heart sounds.   Pulmonary/Chest: Normal expansion, symmetric chest wall expansion and breath sounds normal. He has no rhonchi. He has no rales.   Right-sided PleurX catheter without any surrounding erythema or induration. Negative for tactile fremitus.   Abdominal: Soft. Bowel sounds are normal. He exhibits no distension. There is no abdominal tenderness.   Musculoskeletal: Normal range of motion.         General: No tenderness, deformity or edema.   Lymphadenopathy: No supraclavicular adenopathy is present.     He has no cervical adenopathy.     He has no axillary adenopathy.   Neurological: He is alert and oriented to person, place, and time. No cranial nerve deficit.   Skin: Skin is warm and dry. No cyanosis. Nails show no clubbing.    Psychiatric: He has a normal mood and affect.   Nursing note and vitals reviewed.       Personal Review of Relevant Diagnostic Studies:  I have personally reviewed and interpreted the following labs/studies/images.  · CT Chest:  ? 4/21/2020:  § 1. No evidence of pulmonary embolism to the segmental arterial level. If there is continued clinical concern, consider further evaluation with lower extremity doppler.  § 2. Moderate size right pleural effusion and adjacent atelectasis/consolidation.  § 3.  Nodular density at the right lung apex, possibly representing an additional focus of atelectasis noting that malignancy is not excluded and or a follow-up CT after treatment to document resolution would be warranted.  · CXR:  ? 6/17/2020:  · Unchanged right pleural effusion and inferior hemithorax pleural-parenchymal opacities since 5/29/2020.  ? 4/23/2020  § Bilateral perihilar and bibasilar atelectasis, with small right and tiny left pleural effusions.  · Pleural fluid cytology:  ? 4/21/2020:  § Adenocarcinoma.  § The morphologic and immunophenotypic features are not entirely specific   but could suggest an upper gastrointestinal tract origin. The pattern   of staining is less likely to be seen in tumors of lung origin but the   possibility cannot be entirely excluded.   CXR:  o 5/1/2020:  No pneumothorax status post right thoracentesis.  Small persistent pleural effusion.      Assessment:     1. Malignant pleural effusion    2. Non-small cell cancer of right lung    3. Former smoker    4. Chest tube in place      Impression:  Symptomatically improved with placement of right pleural catheter.  Seems to have obtained pleurodesis.      Plan:     · Repeat CXR in 2 weeks.  · If no residual effusion, will remove pleurex..     I informed the patient of my working diagnosis, it's etiology, risk factors, expected symptoms, diagnostic work up, treatment options and prognosis., I personally reviewed the results of relevant  imaging/labs/studies with the patient, and discussed their clinical significance., I spent >10 minutes counseling the patient about their condition., Plan discussed with the patient, who is in agreement., Opportunity provided for the the patient to voice any additional questions or concerns., All questions were answered to the patient's satisfaction., Educational material provided and Patient given date for next visit.    Follow up in about 2 years (around 7/7/2022).    Orders Placed This Visit:  Orders Placed This Encounter   Procedures    X-Ray Chest PA And Lateral     Standing Status:   Future     Standing Expiration Date:   7/7/2021     Order Specific Question:   Reason for Exam:     Answer:   pleural effusion     Order Specific Question:   May the Radiologist modify the order per protocol to meet the clinical needs of the patient?     Answer:   Yes       Vincent Torres MD  Pulmonary / Critical Care Medicine  [facility]

## 2020-07-15 NOTE — TELEPHONE ENCOUNTER
Weekly standing CBC, CMP, monthly TSH put into [facility] and sent to [place] for labs

## 2020-07-16 NOTE — ASSESSMENT & PLAN NOTE
Patient is doing well on carbo/pem/pem.  Unfortunately, the Caris testing was unable to identify several mutations.  Will continue current care for now and perhaps look at this again in the future based on patient response.  Discussed this today and will have patient back with me again in three weeks.  Plan on scanning after four treatment.

## 2020-07-16 NOTE — PROGRESS NOTES
PROGRESS NOTE    Subjective:       Patient ID: nAson oD is a 75 y.o. male.    4/20/2020:  Patient presents to [facility] with cough and sob and right pleural effusion.      4/21/2020:  Thoracentesis 2L fluid. Path c/w Adenocarcinoma. CTA shows RUL nodular density.      5/4/2020:  Right pleurex cath placed.    5/29/2020:  Needle biopsy of right lung lesion: adenocarcinoma  Caris Testing  PDL1  10%  BRAF   Neg  Trk  Neg  MMR  Prof  EGFR  NST  Ros 1  NST  ALK  NST    Chief Complaint:  No chief complaint on file.  Lung cancer follow up.     History of Present Illness:   Anson Do is a 75 y.o. male who presents for follow up of lung cancer.     Carbo/pem/pem  Cycle 1: 6/8/2020  Cycle 2: 6/29/2020  Cycle 3: 7/20/2020-due    Family and Social history reviewed and is unchanged from 5/12/2020      ROS:  Review of Systems   Constitutional: Positive for unexpected weight change. Negative for appetite change and fever.   HENT: Negative for nosebleeds.    Eyes: Negative for visual disturbance.   Respiratory: Negative for cough, chest tightness and shortness of breath.    Cardiovascular: Negative for chest pain.   Gastrointestinal: Negative for abdominal pain, blood in stool and diarrhea.   Genitourinary: Negative for frequency and hematuria.   Musculoskeletal: Negative for back pain.   Skin: Negative for rash.   Neurological: Negative for headaches.   Hematological: Negative for adenopathy. Does not bruise/bleed easily.   Psychiatric/Behavioral: The patient is not nervous/anxious.           Current Outpatient Medications:     apixaban (ELIQUIS) 5 mg Tab, Take 1 tablet (5 mg total) by mouth 2 (two) times daily., Disp: 30 tablet, Rfl: 2    colchicine (COLCRYS) 0.6 mg tablet, Take 1 tablet (0.6 mg total) by mouth once daily., Disp: 30 tablet, Rfl: 2    dexAMETHasone (DECADRON) 4 MG Tab, Take 4 mg by mouth every 12 (twelve) hours., Disp: , Rfl:     fexofenadine (ALLEGRA)  180 MG tablet, Take 180 mg by mouth once daily., Disp: , Rfl:     folic acid (FOLVITE) 1 MG tablet, Take 1 tablet (1 mg total) by mouth once daily., Disp: 100 tablet, Rfl: 3    ondansetron (ZOFRAN) 8 MG tablet, Take 1 tablet (8 mg total) by mouth every 8 (eight) hours as needed for Nausea., Disp: 30 tablet, Rfl: 5    pantoprazole (PROTONIX) 40 MG tablet, Take 1 tablet (40 mg total) by mouth once daily., Disp: 30 tablet, Rfl: 2    promethazine (PHENERGAN) 25 MG tablet, Take 25 mg by mouth every 6 (six) hours as needed for Nausea., Disp: , Rfl:     albuterol (PROVENTIL/VENTOLIN HFA) 90 mcg/actuation inhaler, Inhale 2 puffs into the lungs every 4 to 6 hours as needed for Shortness of Breath. Rescue, Disp: 18 g, Rfl: 3    predniSONE (DELTASONE) 20 MG tablet, Take 1 tablet (20 mg total) by mouth once daily., Disp: 5 tablet, Rfl: 0    Current Facility-Administered Medications:     cyanocobalamin injection 1,000 mcg, 1,000 mcg, Subcutaneous, Q30 Days, Blake Donovan MD, 1,000 mcg at 06/08/20 1034        Objective:       Physical Examination:     /63   Pulse 60   Temp 98.6 °F (37 °C)   Resp 19   Wt 111.1 kg (245 lb)   BMI 31.46 kg/m²     Physical Exam  Vitals signs reviewed.   Constitutional:       Appearance: He is well-developed.   HENT:      Head: Normocephalic and atraumatic.      Right Ear: External ear normal.      Left Ear: External ear normal.   Eyes:      General: No scleral icterus.     Conjunctiva/sclera: Conjunctivae normal.      Pupils: Pupils are equal, round, and reactive to light.   Neck:      Musculoskeletal: Normal range of motion and neck supple.   Cardiovascular:      Rate and Rhythm: Normal rate and regular rhythm.      Heart sounds: Normal heart sounds. No murmur. No friction rub. No gallop.    Pulmonary:      Effort: Pulmonary effort is normal. No respiratory distress.      Breath sounds: Normal breath sounds. No rales.   Chest:      Chest wall: No tenderness.   Abdominal:       General: Bowel sounds are normal. There is no distension.      Palpations: Abdomen is soft. There is no mass.      Tenderness: There is no abdominal tenderness. There is no guarding or rebound.   Lymphadenopathy:      Head:      Right side of head: No tonsillar adenopathy.      Left side of head: No tonsillar adenopathy.      Cervical: No cervical adenopathy.      Upper Body:      Right upper body: No supraclavicular adenopathy.      Left upper body: No supraclavicular adenopathy.   Neurological:      Mental Status: He is alert and oriented to person, place, and time.   Psychiatric:         Behavior: Behavior normal.         Thought Content: Thought content normal.         Judgment: Judgment normal.         Labs:   No results found for this or any previous visit (from the past 336 hour(s)).  CMP  Sodium   Date Value Ref Range Status   06/18/2020 138 136 - 145 mmol/L Final     Potassium   Date Value Ref Range Status   06/18/2020 3.8 3.5 - 5.1 mmol/L Final     Chloride   Date Value Ref Range Status   06/18/2020 99 95 - 110 mmol/L Final     CO2   Date Value Ref Range Status   06/18/2020 27 23 - 29 mmol/L Final     Glucose   Date Value Ref Range Status   06/18/2020 103 70 - 110 mg/dL Final     BUN, Bld   Date Value Ref Range Status   06/18/2020 14 8 - 23 mg/dL Final     Creatinine   Date Value Ref Range Status   06/18/2020 1.0 0.5 - 1.4 mg/dL Final     Calcium   Date Value Ref Range Status   06/18/2020 8.6 (L) 8.7 - 10.5 mg/dL Final     Total Protein   Date Value Ref Range Status   06/18/2020 6.3 6.0 - 8.4 g/dL Final     Albumin   Date Value Ref Range Status   06/18/2020 2.7 (L) 3.5 - 5.2 g/dL Final     Total Bilirubin   Date Value Ref Range Status   06/18/2020 0.4 0.1 - 1.0 mg/dL Final     Comment:     For infants and newborns, interpretation of results should be based  on gestational age, weight and in agreement with clinical  observations.  Premature Infant recommended reference ranges:  Up to 24  hours.............<8.0 mg/dL  Up to 48 hours............<12.0 mg/dL  3-5 days..................<15.0 mg/dL  6-29 days.................<15.0 mg/dL       Alkaline Phosphatase   Date Value Ref Range Status   06/18/2020 56 55 - 135 U/L Final     AST   Date Value Ref Range Status   06/18/2020 14 10 - 40 U/L Final     ALT   Date Value Ref Range Status   06/18/2020 16 10 - 44 U/L Final     Anion Gap   Date Value Ref Range Status   06/18/2020 12 8 - 16 mmol/L Final     eGFR if    Date Value Ref Range Status   06/18/2020 >60.0 >60 mL/min/1.73 m^2 Final     eGFR if non    Date Value Ref Range Status   06/18/2020 >60.0 >60 mL/min/1.73 m^2 Final     Comment:     Calculation used to obtain the estimated glomerular filtration  rate (eGFR) is the CKD-EPI equation.        No results found for: CEA  Lab Results   Component Value Date    PSA 0.53 05/14/2010    PSA 0.44 12/16/2008    PSA 0.3 11/03/2007           Assessment/Plan:     Problem List Items Addressed This Visit     Malignant neoplasm of upper lobe of right lung     Patient is doing well on carbo/pem/pem.  Unfortunately, the Caris testing was unable to identify several mutations.  Will continue current care for now and perhaps look at this again in the future based on patient response.  Discussed this today and will have patient back with me again in three weeks.  Plan on scanning after four treatment.          Pleural effusion     Fluid has slowed down dramatically and may actually pull pleurex.  Will follow with pulmonary.                Discussion:     Follow up in about 3 weeks (around 8/6/2020).      Electronically signed by Blake Hartman

## 2020-07-20 NOTE — PLAN OF CARE
Problem: Anemia  Goal: Anemia Symptom Improvement  Outcome: Ongoing, Progressing  Intervention: Monitor and Manage Anemia  Flowsheets (Taken 7/20/2020 1306)  Oral Nutrition Promotion: rest periods promoted  Fatigue Management: frequent rest breaks encouraged

## 2020-07-23 NOTE — PROGRESS NOTES
[facility]  Pulmonology  Follow-Up Clinic Visit    Subjective:     Reason for Visit:    Anson Do is a 75 y.o. male followed for right sided malignant pleural effusion.    Chief Complaint   Patient presents with    Pleural Effusion     follow up to see if we need to remove drain       05/07/2020:  A PleurX catheter placed on Monday.  Doing well since then.  Drained approximately 200 cc of pleural fluid today.  Cough is improving.  No new complaints.  As an appoint with Dr. Vilchis next week.  Awaiting insurance approval for his PET/CT.    7/72020:  Doing well since our last visit.  Started on chemotherapy and has received 2 cycles.  Dyspnea has resolved.  Hasn't drained his pleurex in 3 weeks.    7/23/2020:  Doing well since our last visit.  No more fluid draining from his pleurex.  No complaints and breathing doing well.     Review of Systems   Constitutional: Negative for fever, weight loss and night sweats.   HENT: Negative for postnasal drip, rhinorrhea and sinus pressure.    Eyes: Negative for redness and itching.   Respiratory: Negative for hemoptysis, sputum production, choking, shortness of breath, orthopnea, pleurisy and dyspnea on extertion.    Cardiovascular: Negative for palpitations and leg swelling.   Genitourinary: Negative for difficulty urinating and hematuria.   Endocrine: Negative for polydipsia, polyphagia and polyuria.    Musculoskeletal: Negative for gait problem.   Neurological: Negative for syncope.   Hematological: Negative for adenopathy. Does not bruise/bleed easily and no excessive bruising.   Psychiatric/Behavioral: Negative for confusion and sleep disturbance. The patient is not nervous/anxious.       Outpatient Medications as of 7/23/2020   Medication    albuterol (PROVENTIL/VENTOLIN HFA) 90 mcg/actuation inhaler    apixaban (ELIQUIS) 5 mg Tab    colchicine (COLCRYS) 0.6 mg tablet    dexAMETHasone (DECADRON) 4 MG Tab    fexofenadine (ALLEGRA) 180 MG tablet     folic acid (FOLVITE) 1 MG tablet    ondansetron (ZOFRAN) 8 MG tablet    pantoprazole (PROTONIX) 40 MG tablet    predniSONE (DELTASONE) 20 MG tablet    promethazine (PHENERGAN) 25 MG tablet     Facility-Administered Medications as of 7/23/2020   Medication Route Frequency    cyanocobalamin injection 1,000 mcg Subcutaneous Q30 Days      Previous Reports Reviewed:   ER records, historical medical records, lab reports, nursing home notes, office notes, operative reports, radiology reports, referral letter/letters and x-ray reports   The following portions of the patient's history were reviewed and updated as appropriate: allergies, current medications, past family history, past medical history, past social history, past surgical history and problem list.      Objective:     /70 (BP Location: Left arm, Patient Position: Sitting)   Pulse (!) 57   Temp 97.3 °F (36.3 °C)   Wt 111.6 kg (246 lb)   SpO2 96%   BMI 31.58 kg/m²   Body mass index is 31.58 kg/m².     Physical Exam   Constitutional: He is oriented to person, place, and time. He appears well-developed and well-nourished. No distress.   HENT:   Head: Normocephalic.   Nose: Nose normal.   Mouth/Throat: Oropharynx is clear and moist. Mallampati Score: II.   Neck: Normal range of motion. Neck supple. No JVD present. No tracheal deviation present. No thyromegaly present.   Cardiovascular: Normal rate, regular rhythm and normal heart sounds.   Pulmonary/Chest: Normal expansion, symmetric chest wall expansion and breath sounds normal. No respiratory distress. He has no rhonchi. He has no rales.   Right-sided PleurX catheter without any surrounding erythema or induration. Negative for tactile fremitus.   Abdominal: Soft. Bowel sounds are normal. He exhibits no distension. There is no abdominal tenderness.   Musculoskeletal: Normal range of motion.         General: No tenderness, deformity or edema.   Lymphadenopathy: No supraclavicular adenopathy is present.      He has no cervical adenopathy.     He has no axillary adenopathy.   Neurological: He is alert and oriented to person, place, and time. No cranial nerve deficit.   Skin: Skin is warm and dry. No cyanosis. Nails show no clubbing.   Psychiatric: He has a normal mood and affect.   Nursing note and vitals reviewed.       Personal Review of Relevant Diagnostic Studies:  I have personally reviewed and interpreted the following labs/studies/images.  · CT Chest:  ? 4/21/2020:  § 1. No evidence of pulmonary embolism to the segmental arterial level. If there is continued clinical concern, consider further evaluation with lower extremity doppler.  § 2. Moderate size right pleural effusion and adjacent atelectasis/consolidation.  § 3.  Nodular density at the right lung apex, possibly representing an additional focus of atelectasis noting that malignancy is not excluded and or a follow-up CT after treatment to document resolution would be warranted.  · CXR:  ? 6/17/2020:  · Unchanged right pleural effusion and inferior hemithorax pleural-parenchymal opacities since 5/29/2020.  ? 4/23/2020  § Bilateral perihilar and bibasilar atelectasis, with small right and tiny left pleural effusions.  · Pleural fluid cytology:  ? 4/21/2020:  § Adenocarcinoma.  § The morphologic and immunophenotypic features are not entirely specific   but could suggest an upper gastrointestinal tract origin. The pattern   of staining is less likely to be seen in tumors of lung origin but the   possibility cannot be entirely excluded.   CXR:  o 5/1/2020:  No pneumothorax status post right thoracentesis.  Small persistent pleural effusion.  o 7/22/2020:  Resolution of the right effusion with pleurex in place.      Assessment:     1. Malignant pleural effusion    2. Malignant neoplasm of upper lobe of right lung    3. Pleural effusion    4. Pulmonary nodule      Impression:  Resolved R MPE with placement of right pleural catheter.  Seems to have obtained  pleurodesis.      Plan:     · Hold anticoagulation starting tomorrow.  · Remove pleurex in Specials next Monday.     I informed the patient of my working diagnosis, it's etiology, risk factors, expected symptoms, diagnostic work up, treatment options and prognosis., I personally reviewed the results of relevant imaging/labs/studies with the patient, and discussed their clinical significance., I spent >10 minutes counseling the patient about their condition., Plan discussed with the patient, who is in agreement., Opportunity provided for the the patient to voice any additional questions or concerns., All questions were answered to the patient's satisfaction., Educational material provided and Patient given date for next visit.    No follow-ups on file.    Orders Placed This Visit:  Orders Placed This Encounter   Procedures    Case request GI: Pleurex removal     Order Specific Question:   Medical Necessity:     Answer:   Medically Non-Urgent [100]     Order Specific Question:   CPT Code:     Answer:   KS INSTILL VIA CHEST TUBE AGENT FOR FIBRINOLYSIS, 1ST DAY [03207]     Order Specific Question:   Requested Time     Answer:   10:00 AM     Order Specific Question:   Case Referring Provider     Answer:   SHELL TORRES [[id number]]     Order Specific Question:   Post-Procedure Disposition:     Answer:   Amb Surgery/DOSC [2]     Order Specific Question:   Expected Duration (including turnover):     Answer:   15     Order Specific Question:   Is an on-site pathologist required for this procedure?     Answer:   N/A       Shell Torres MD  Pulmonary / Critical Care Medicine  [facility]

## 2020-07-23 NOTE — PATIENT INSTRUCTIONS
Hold Eliquis starting on Friday.  Come in to Outpatient Surgery on Monday morning so we can remove your Pleurex catheter.    Pleural Effusion     Pleural effusion is fluid buildup between the layers of tissue that line the outside of the lungs.   Your healthcare provider has told you that you have pleural effusion. Pleural effusion means that you have extra fluid between the pleura. This area is called the pleural space. The pleura are 2 layers of thin, smooth tissue that surround the lungs and line the chest. The pleural space usually holds only a small amount of fluid. This fluid lubricates the pleura. But if too much fluid fills the space, it can make it hard or painful to breathe.  There are 2 types of pleural effusion:  · Inflammatory. This is caused by a lung disease like pneumonia or lung cancer, both of which irritates the pleura.  · Noninflammatory. This is caused by abnormal fluid pressures inside the lungs. The pressure can be caused by congestive heart failure (CHF). In CHF, extra fluid collects inside the lung tissues because of a weak heart muscle. This extra fluid then leaks into the pleural space. Other causes of noninflammatory pleural effusions include kidney disease, liver disease, and malnutrition.  What are the symptoms of pleural effusion?  The symptoms of pleural effusion include:  · Sharp pains in the chest, especially when taking a breath, coughing, or sneezing  · Trouble breathing  · Cough  · Fever  What are the causes of pleural effusion?  Common causes include:  · Congestive heart failure  · Cirrhosis of the liver  · Pneumonia  · Viral lung infection  · Cancer  · Blood clot in the lung (pulmonary embolism)  · Heart surgery  Chest infections like pneumonia and heart disease are the most common causes. Less common causes include lung cancer.  How is pleural effusion diagnosed?  Your healthcare provider will examine you and ask about your health history. Tests include:  · Blood  tests  · Analysis of fluid in pleural space, chest X-ray, CT scan, or ultrasound  How is pleural effusion treated?  The extra fluid may be drained from the pleural space. This is done with a procedure called thoracentesis. This procedure uses a thin needle to draw out the fluid from the pleural space. In some cases, a tube is placed in the chest to drain the extra fluid. The tube will likely stay in place for several days.  You may have other treatments, depending on the cause of your pleural effusion. If its because of a bacterial infection, you will be given antibiotics to fight the infection. If its because of a heart condition, you will be given medicines and other treatment for your heart. Your healthcare provider can tell you more about the cause of your pleural effusion and your treatment choices.  What are the long-term concerns?  If untreated, pleural effusion can lead to serious health problems, such as collapsed lung from fluid filling the pleural space.     Call 911  Call 911 if you have:  · Trouble breathing  · Worsening chest pain   When to call your healthcare provider   Call your healthcare provider right away if you have:  · Continued coughing  · Fever  Date Last Reviewed: 12/1/2016  © 5258-7928 [employer]. [address] [place] [address], [place] [address]. All rights reserved. This information is not intended as a substitute for professional medical care. Always follow your healthcare professional's instructions.

## 2020-07-27 NOTE — PROCEDURES
[facility]  Pulmonary / Critical Care Medicine  Procedure Note    Procedure:   Removal of tunneled pleural catheter (right sided).    Pre-operative Diagnosis:    Resolved Malignant Pleural Effusion (right side)    Post-operative Diagnosis:   Same    Indications:    Resolved Pleural Effusion    Procedure :   Vincent Torres MD    Procedure Details:   After the patient was positioned supine with his right hand above his head.. The usual sterile field was created using Chlorhexadine solution and sterile drapes. 8 cc of 1% plain lidocaine was then used to create a wheal,  The cuff of the catheter was disected away from the subcutaneous tissue using a hemostat and the catheter was then withdrawn.  A post-op X ray did not demonstrate any residual PTX.    Complications:    None; patient tolerated the procedure well..          Condition:   None; patient tolerated the procedure well.    Disposition:    Discharged home.  Ambulatory and in good condition.    Vincent Torres MD  Pulmonary / Critical  07/27/2020  12:08 PM

## 2020-07-27 NOTE — PLAN OF CARE
Pt continues awake and alert. Resp even and unlabored.  Pt provided with juice. Denies pain. No distress noted.

## 2020-07-27 NOTE — H&P
[facility]  Pulmonology  H&P    Patient Name: Anson Do  MRN: [id number]  Admission Date: 2020   Code Status: Prior  Primary Care Provider: Vincent Torres MD   Principal Problem: <principal problem not specified>    Subjective:     HPI: here for pleurex removal.  No complaints.  Has not taken his DOAC over the weekend.    Past Medical History:   Diagnosis Date    Adenocarcinoma of lung 2020    Back pain     GERD (gastroesophageal reflux disease)     GERD (gastroesophageal reflux disease)     [place] (hard of hearing)     Pleural effusion on right 2020    SOB (shortness of breath)        Past Surgical History:   Procedure Laterality Date    BACK SURGERY      ESOPHAGOGASTRODUODENOSCOPY N/A 2020    Procedure: EGD (ESOPHAGOGASTRODUODENOSCOPY);  Surgeon: Nisha Curry MD;  Location: [facility] ENDO;  Service: Endoscopy;  Laterality: N/A;    hernina repair      INSERTION OF PLEURAL CATHETER Right 2020    pleur X Cath    INSERTION OF TUNNELED CENTRAL VENOUS CATHETER (CVC) WITH SUBCUTANEOUS PORT N/A 2020    Procedure: [id number]-[id number]-[id number]-CATH;  Surgeon: Yoli Rivas MD;  Location: [facility] OR;  Service: General;  Laterality: N/A;    LUMBAR SPINE SURGERY      L3-l4    THORACENTESIS Right 2020       Review of patient's allergies indicates:  No Known Allergies    Family History     None        Tobacco Use    Smoking status: Former Smoker     Packs/day: 2.00     Start date:      Quit date: 1988     Years since quittin.0   Substance and Sexual Activity    Alcohol use: Never     Frequency: Never    Drug use: Never    Sexual activity: Not on file         Review of Systems  Objective:     Vital Signs (Most Recent):  Temp: 98.5 °F (36.9 °C) (20)  Pulse: (!) 48 (20)  Resp: 14 (20)  BP: (!) 164/80 (20)  SpO2: 98 % (20) Vital Signs (24h Range):  Temp:  [98.5 °F (36.9 °C)] 98.5 °F (36.9 °C)  Pulse:  [48] 48  Resp:   [14] 14  SpO2:  [98 %] 98 %  BP: (164)/(80) 164/80     Weight: 106.1 kg (234 lb)  Body mass index is 30.04 kg/m².    No intake or output data in the 24 hours ending 07/27/20 1008    Physical Exam    Vents:       Lines/Drains/Airways     Central Venous Catheter Line            Port A Cath Single Lumen 05/20/20  right subclavian 68 days          Drain                 Closed/Suction Drain 05/04/20 1345 Right;Lateral Chest Other (Comment) 83 days          Peripheral Intravenous Line                 Peripheral IV - Single Lumen 07/27/20 0936 20 G Right Forearm less than 1 day                Significant Labs:    CBC/Anemia Profile:  No results for input(s): WBC, HGB, HCT, PLT, MCV, RDW, IRON, FERRITIN, RETIC, FOLATE, KJVIVECM87, OCCULTBLOOD in the last 48 hours.     Chemistries:  No results for input(s): NA, K, CL, CO2, BUN, CREATININE, CALCIUM, ALBUMIN, PROT, BILITOT, ALKPHOS, ALT, AST, GLUCOSE, MG, PHOS in the last 48 hours.    All pertinent labs within the past 24 hours have been reviewed.    Significant Imaging:   I have reviewed and interpreted all pertinent imaging results/findings within the past 24 hours.    Assessment/Plan:     There are no hospital problems to display for this patient.      Remove esperanza cathchandu.         Vincent Torres MD  Pulmonology  [facility]

## 2020-07-27 NOTE — DISCHARGE SUMMARY
[facility]  Pulmonology  Discharge Summary      Patient Name: Anson Do  MRN: [id number]  Admission Date: 7/27/2020  Hospital Length of Stay: 0 days  Discharge Date and Time:  07/27/2020 12:14 PM  Attending Physician: Vincent Torres MD   Discharging Provider: Vincent Torres MD  Primary Care Provider: Vincent Torres MD    HPI:  No notes on file    Procedure(s) (LRB):  PLEUREX REMOVAL (Right)    Indwelling Lines/Drains at Time of Discharge:   Lines/Drains/Airways     Central Venous Catheter Line            Port A Cath Single Lumen 05/20/20  right subclavian 68 days          Drain                 Closed/Suction Drain 05/04/20 1345 Right;Lateral Chest Other (Comment) 83 days                Hospital Course:  No notes on file        Significant Labs:  All pertinent labs within the past 24 hours have been reviewed.    Significant Imaging:  I have reviewed and interpreted all pertinent imaging results/findings within the past 24 hours.    Pending Diagnostic Studies:     None        There are no hospital problems to display for this patient.      Discharged Condition: good    Disposition: Home or Self Care    Follow Up:  Follow-up Information     Vincent Torres MD In 1 week.    Specialties: Hospitalist, Pulmonary Disease  Contact information:  [address] [place] [address]  [address]  [facility] [address]  [phone]                 Patient Instructions:      Diet Adult Regular     Notify your health care provider if you experience any of the following:  increased confusion or weakness     Notify your health care provider if you experience any of the following:  persistent dizziness, light-headedness, or visual disturbances     Notify your health care provider if you experience any of the following:  worsening rash     Notify your health care provider if you experience any of the following:  severe persistent headache     Notify your health care provider if you experience any of the following:  difficulty breathing or increased  cough     Notify your health care provider if you experience any of the following:  redness, tenderness, or signs of infection (pain, swelling, redness, odor or green/yellow discharge around incision site)     Notify your health care provider if you experience any of the following:  severe uncontrolled pain     Notify your health care provider if you experience any of the following:  temperature >100.4     Notify your health care provider if you experience any of the following:  persistent nausea and vomiting or diarrhea     Remove dressing in 48 hours     Shower on day dressing removed (No bath)     Activity as tolerated     Medications:  Reconciled Home Medications:      Medication List      CONTINUE taking these medications    albuterol 90 mcg/actuation inhaler  Commonly known as: PROVENTIL/VENTOLIN HFA  Inhale 2 puffs into the lungs every 4 to 6 hours as needed for Shortness of Breath. Rescue     apixaban 5 mg Tab  Commonly known as: ELIQUIS  Take 1 tablet (5 mg total) by mouth 2 (two) times daily.     colchicine 0.6 mg tablet  Commonly known as: COLCRYS  Take 1 tablet (0.6 mg total) by mouth once daily.     dexAMETHasone 4 MG Tab  Commonly known as: DECADRON  Take 4 mg by mouth every 12 (twelve) hours.     fexofenadine 180 MG tablet  Commonly known as: ALLEGRA  Take 180 mg by mouth once daily.     folic acid 1 MG tablet  Commonly known as: FOLVITE  Take 1 tablet (1 mg total) by mouth once daily.     ondansetron 8 MG tablet  Commonly known as: ZOFRAN  Take 1 tablet (8 mg total) by mouth every 8 (eight) hours as needed for Nausea.     pantoprazole 40 MG tablet  Commonly known as: PROTONIX  Take 1 tablet (40 mg total) by mouth once daily.     promethazine 25 MG tablet  Commonly known as: PHENERGAN  Take 25 mg by mouth every 6 (six) hours as needed for Nausea.        STOP taking these medications    predniSONE 20 MG tablet  Commonly known as: DELTASONE            Vincent Torres MD  Pulmonology  [place]  [facility]

## 2020-07-27 NOTE — PLAN OF CARE
Pt tolerated procedure well. Pt continues awake and alert. REsp even and unlabored. Gauze and tegaderm applied to right pleural pleur x site. VS stable. Pt appears in no distress. Side rails up x 2 HOB 70degrees. Bed locked and in low position.

## 2020-07-27 NOTE — DISCHARGE INSTRUCTIONS
RESUME ELIQUIS ON Tuesday 07/28/2020  Discharge Instructions: After Your Surgery  Youve just had surgery. During surgery, you were given medicine called anesthesia to keep you relaxed and free of pain. After surgery, you may have some pain or nausea. This is common. Here are some tips for feeling better and getting well after surgery.     Stay on schedule with your medicine.   Going home  Your healthcare provider will show you how to take care of yourself when you go home. He or she will also answer your questions. Have an adult family member or friend drive you home. For the first 24 hours after your surgery:  · Do not drive or use heavy equipment.  · Do not make important decisions or sign legal papers.  · Do not drink alcohol.  · Have someone stay with you, if needed. He or she can watch for problems and help keep you safe.  Be sure to go to all follow-up visits with your healthcare provider. And rest after your surgery for as long as your healthcare provider tells you to.  Coping with pain  If you have pain after surgery, pain medicine will help you feel better. Take it as told, before pain becomes severe. Also, ask your healthcare provider or pharmacist about other ways to control pain. This might be with heat, ice, or relaxation. And follow any other instructions your surgeon or nurse gives you.  Tips for taking pain medicine  To get the best relief possible, remember these points:  · Pain medicines can upset your stomach. Taking them with a little food may help.  · Most pain relievers taken by mouth need at least 20 to 30 minutes to start to work.  · Taking medicine on a schedule can help you remember to take it. Try to time your medicine so that you can take it before starting an activity. This might be before you get dressed, go for a walk, or sit down for dinner.  · Constipation is a common side effect of pain medicines. Call your healthcare provider before taking any medicines such as laxatives or  stool softeners to help ease constipation. Also ask if you should skip any foods. Drinking lots of fluids and eating foods such as fruits and vegetables that are high in fiber can also help. Remember, do not take laxatives unless your surgeon has prescribed them.  · Drinking alcohol and taking pain medicine can cause dizziness and slow your breathing. It can even be deadly. Do not drink alcohol while taking pain medicine.  · Pain medicine can make you react more slowly to things. Do not drive or run machinery while taking pain medicine.  Your healthcare provider may tell you to take acetaminophen to help ease your pain. Ask him or her how much you are supposed to take each day. Acetaminophen or other pain relievers may interact with your prescription medicines or other over-the-counter (OTC) medicines. Some prescription medicines have acetaminophen and other ingredients. Using both prescription and OTC acetaminophen for pain can cause you to overdose. Read the labels on your OTC medicines with care. This will help you to clearly know the list of ingredients, how much to take, and any warnings. It may also help you not take too much acetaminophen. If you have questions or do not understand the information, ask your pharmacist or healthcare provider to explain it to you before you take the OTC medicine.  Managing nausea  Some people have an upset stomach after surgery. This is often because of anesthesia, pain, or pain medicine, or the stress of surgery. These tips will help you handle nausea and eat healthy foods as you get better. If you were on a special food plan before surgery, ask your healthcare provider if you should follow it while you get better. These tips may help:  · Do not push yourself to eat. Your body will tell you when to eat and how much.  · Start off with clear liquids and soup. They are easier to digest.  · Next try semi-solid foods, such as mashed potatoes, applesauce, and gelatin, as you feel  ready.  · Slowly move to solid foods. Dont eat fatty, rich, or spicy foods at first.  · Do not force yourself to have 3 large meals a day. Instead eat smaller amounts more often.  · Take pain medicines with a small amount of solid food, such as crackers or toast, to avoid nausea.     Call your surgeon if  · You still have pain an hour after taking medicine. The medicine may not be strong enough.  · You feel too sleepy, dizzy, or groggy. The medicine may be too strong.  · You have side effects like nausea, vomiting, or skin changes, such as rash, itching, or hives.       If you have obstructive sleep apnea  You were given anesthesia medicine during surgery to keep you comfortable and free of pain. After surgery, you may have more apnea spells because of this medicine and other medicines you were given. The spells may last longer than usual.   At home:  · Keep using the continuous positive airway pressure (CPAP) device when you sleep. Unless your healthcare provider tells you not to, use it when you sleep, day or night. CPAP is a common device used to treat obstructive sleep apnea.  · Talk with your provider before taking any pain medicine, muscle relaxants, or sedatives. Your provider will tell you about the possible dangers of taking these medicines.  Date Last Reviewed: 12/1/2016  © 9252-8820 [employer]. [address] [place] [address], [place] [address]. All rights reserved. This information is not intended as a substitute for professional medical care. Always follow your healthcare professional's instructions.

## 2020-07-28 NOTE — TELEPHONE ENCOUNTER
Resent lab orders to Tess at [facility]. TSH to be done monthly. CBC, CMP weekly. Standing order for 6 months

## 2020-08-04 NOTE — PATIENT INSTRUCTIONS
Pleural Effusion     Pleural effusion is fluid buildup between the layers of tissue that line the outside of the lungs.   Your healthcare provider has told you that you have pleural effusion. Pleural effusion means that you have extra fluid between the pleura. This area is called the pleural space. The pleura are 2 layers of thin, smooth tissue that surround the lungs and line the chest. The pleural space usually holds only a small amount of fluid. This fluid lubricates the pleura. But if too much fluid fills the space, it can make it hard or painful to breathe.  There are 2 types of pleural effusion:  · Inflammatory. This is caused by a lung disease like pneumonia or lung cancer, both of which irritates the pleura.  · Noninflammatory. This is caused by abnormal fluid pressures inside the lungs. The pressure can be caused by congestive heart failure (CHF). In CHF, extra fluid collects inside the lung tissues because of a weak heart muscle. This extra fluid then leaks into the pleural space. Other causes of noninflammatory pleural effusions include kidney disease, liver disease, and malnutrition.  What are the symptoms of pleural effusion?  The symptoms of pleural effusion include:  · Sharp pains in the chest, especially when taking a breath, coughing, or sneezing  · Trouble breathing  · Cough  · Fever  What are the causes of pleural effusion?  Common causes include:  · Congestive heart failure  · Cirrhosis of the liver  · Pneumonia  · Viral lung infection  · Cancer  · Blood clot in the lung (pulmonary embolism)  · Heart surgery  Chest infections like pneumonia and heart disease are the most common causes. Less common causes include lung cancer.  How is pleural effusion diagnosed?  Your healthcare provider will examine you and ask about your health history. Tests include:  · Blood tests  · Analysis of fluid in pleural space, chest X-ray, CT scan, or ultrasound  How is pleural effusion treated?  The extra fluid may  be drained from the pleural space. This is done with a procedure called thoracentesis. This procedure uses a thin needle to draw out the fluid from the pleural space. In some cases, a tube is placed in the chest to drain the extra fluid. The tube will likely stay in place for several days.  You may have other treatments, depending on the cause of your pleural effusion. If its because of a bacterial infection, you will be given antibiotics to fight the infection. If its because of a heart condition, you will be given medicines and other treatment for your heart. Your healthcare provider can tell you more about the cause of your pleural effusion and your treatment choices.  What are the long-term concerns?  If untreated, pleural effusion can lead to serious health problems, such as collapsed lung from fluid filling the pleural space.     Call 911  Call 911 if you have:  · Trouble breathing  · Worsening chest pain   When to call your healthcare provider   Call your healthcare provider right away if you have:  · Continued coughing  · Fever  Date Last Reviewed: 12/1/2016  © 2980-7665 [employer]. [address] [place] [address], [place] [address]. All rights reserved. This information is not intended as a substitute for professional medical care. Always follow your healthcare professional's instructions.

## 2020-08-04 NOTE — PROGRESS NOTES
[facility]  Pulmonology  Follow-Up Clinic Visit    Subjective:     Reason for Visit:    Anson Do is a 75 y.o. male followed for right sided malignant pleural effusion.    Chief Complaint   Patient presents with    Pleural Effusion     follow up remove stitches an very sore       05/07/2020:  A PleurX catheter placed on Monday.  Doing well since then.  Drained approximately 200 cc of pleural fluid today.  Cough is improving.  No new complaints.  As an appoint with Dr. Vilchis next week.  Awaiting insurance approval for his PET/CT.    7/72020:  Doing well since our last visit.  Started on chemotherapy and has received 2 cycles.  Dyspnea has resolved.  Hasn't drained his pleurex in 3 weeks.    7/23/2020:  Doing well since our last visit.  No more fluid draining from his pleurex.  No complaints and breathing doing well.    8/4/2020:  Doing great since taking out the pleurex.  No complaints.     Review of Systems   Constitutional: Negative for weight loss and night sweats.   HENT: Negative for postnasal drip, rhinorrhea and sinus pressure.    Eyes: Negative for redness and itching.   Respiratory: Negative for hemoptysis, sputum production, choking, shortness of breath, orthopnea, pleurisy and dyspnea on extertion.    Cardiovascular: Negative for palpitations and leg swelling.   Genitourinary: Negative for difficulty urinating and hematuria.   Endocrine: Negative for polydipsia, polyphagia and polyuria.    Musculoskeletal: Negative for gait problem.   Neurological: Negative for syncope.   Hematological: Negative for adenopathy. Does not bruise/bleed easily and no excessive bruising.   Psychiatric/Behavioral: Negative for confusion and sleep disturbance. The patient is not nervous/anxious.       Outpatient Medications as of 8/4/2020   Medication    albuterol (PROVENTIL/VENTOLIN HFA) 90 mcg/actuation inhaler    apixaban (ELIQUIS) 5 mg Tab    colchicine (COLCRYS) 0.6 mg tablet    dexAMETHasone  (DECADRON) 4 MG Tab    fexofenadine (ALLEGRA) 180 MG tablet    folic acid (FOLVITE) 1 MG tablet    ondansetron (ZOFRAN) 8 MG tablet    pantoprazole (PROTONIX) 40 MG tablet    promethazine (PHENERGAN) 25 MG tablet     No current facility-administered medications on file as of 8/4/2020.       Previous Reports Reviewed:   ER records, historical medical records, lab reports, nursing home notes, office notes, operative reports, radiology reports, referral letter/letters and x-ray reports   The following portions of the patient's history were reviewed and updated as appropriate: allergies, current medications, past family history, past medical history, past social history, past surgical history and problem list.      Objective:     /70 (BP Location: Left arm, Patient Position: Sitting)   Pulse 61   Wt 109.8 kg (242 lb)   SpO2 97%   BMI 31.07 kg/m²   Body mass index is 31.07 kg/m².     Physical Exam   Constitutional: He is oriented to person, place, and time. He appears well-developed and well-nourished. No distress.   HENT:   Head: Normocephalic.   Nose: Nose normal.   Mouth/Throat: Oropharynx is clear and moist. Mallampati Score: II.   Neck: Normal range of motion. Neck supple. No JVD present. No tracheal deviation present. No thyromegaly present.   Cardiovascular: Normal rate, regular rhythm and normal heart sounds.   Pulmonary/Chest: Normal expansion, symmetric chest wall expansion and breath sounds normal. No respiratory distress. He has no rhonchi. He has no rales.   Healed former thoracostomy site with 2 silk sutures.  No erythema or drainage. Negative for tactile fremitus.   Abdominal: Soft. Bowel sounds are normal. He exhibits no distension. There is no abdominal tenderness.   Musculoskeletal: Normal range of motion.         General: No tenderness, deformity or edema.   Lymphadenopathy: No supraclavicular adenopathy is present.     He has no cervical adenopathy.     He has no axillary adenopathy.    Neurological: He is alert and oriented to person, place, and time. No cranial nerve deficit.   Skin: Skin is warm and dry. No cyanosis. Nails show no clubbing.   Psychiatric: He has a normal mood and affect.   Nursing note and vitals reviewed.       Personal Review of Relevant Diagnostic Studies:  I have personally reviewed and interpreted the following labs/studies/images.  · CT Chest:  ? 4/21/2020:  § 1. No evidence of pulmonary embolism to the segmental arterial level. If there is continued clinical concern, consider further evaluation with lower extremity doppler.  § 2. Moderate size right pleural effusion and adjacent atelectasis/consolidation.  § 3.  Nodular density at the right lung apex, possibly representing an additional focus of atelectasis noting that malignancy is not excluded and or a follow-up CT after treatment to document resolution would be warranted.  · CXR:  ? 6/17/2020:  · Unchanged right pleural effusion and inferior hemithorax pleural-parenchymal opacities since 5/29/2020.  ? 4/23/2020  § Bilateral perihilar and bibasilar atelectasis, with small right and tiny left pleural effusions.  · Pleural fluid cytology:  ? 4/21/2020:  § Adenocarcinoma.  § The morphologic and immunophenotypic features are not entirely specific   but could suggest an upper gastrointestinal tract origin. The pattern   of staining is less likely to be seen in tumors of lung origin but the   possibility cannot be entirely excluded.   CXR:  o 5/1/2020:  No pneumothorax status post right thoracentesis.  Small persistent pleural effusion.  o 7/22/2020:  Resolution of the right effusion with pleurex in place.  o 7/27/2020:  Removal of the right Pleurx catheter without pneumothorax. There is a tiny residual right pleural effusion with right lower lobe atelectasis or infiltrate      Assessment:     1. Malignant neoplasm of upper lobe of right lung    2. Pleural effusion    3. Chest tube in place      Impression:  Resolved R  MPE..  Seems to have obtained pleurodesis.      Plan:     · Hold anticoagulation starting tomorrow.  · Remove pleurex in Specials next Monday.     I informed the patient of my working diagnosis, it's etiology, risk factors, expected symptoms, diagnostic work up, treatment options and prognosis., I personally reviewed the results of relevant imaging/labs/studies with the patient, and discussed their clinical significance., I spent >10 minutes counseling the patient about their condition., Plan discussed with the patient, who is in agreement., Opportunity provided for the the patient to voice any additional questions or concerns., All questions were answered to the patient's satisfaction., Educational material provided and Patient given date for next visit.    Follow up if symptoms worsen or fail to improve.    Orders Placed This Visit:  No orders of the defined types were placed in this encounter.      Vincent Torres MD  Pulmonary / Critical Care Medicine  [facility]

## 2020-08-06 NOTE — ASSESSMENT & PLAN NOTE
Mr. Do is doing well on carbo/pem/pem and will be due for cycle four next week.  I will arrange for pet scanning to be done to check on activity of this regimen.  Will likely switch to Alimta/Key after this cycle and discussed this today.

## 2020-08-06 NOTE — ASSESSMENT & PLAN NOTE
Patient remains on Eliquis at this time and is doing well.  His platelet count has not fallen below 100 but will continue to monitor this potential complication with weekly labs.

## 2020-08-06 NOTE — PROGRESS NOTES
PROGRESS NOTE    Subjective:       Patient ID: Anson Do is a 75 y.o. male.    4/20/2020:  Patient presents to [facility] with cough and sob and right pleural effusion.      4/21/2020:  Thoracentesis 2L fluid. Path c/w Adenocarcinoma. CTA shows RUL nodular density.      5/4/2020:  Right pleurex cath placed.    5/29/2020:  Needle biopsy of right lung lesion: adenocarcinoma  Caris Testing  PDL1  10%  BRAF   Neg  Trk  Neg  MMR  Prof  EGFR  NST  Ros 1  NST  ALK  NST    Chief Complaint:  No chief complaint on file.  Lung cancer follow up.     History of Present Illness:   Anson Do is a 75 y.o. male who presents for follow up of lung cancer.   Patient is doing well.  He states he is tolerating the chemotherapy well and has no new complaints today.     Carbo/pem/pem  Cycle 1: 6/8/2020  Cycle 2: 6/29/2020  Cycle 3: 7/20/2020  Cycle 4: 8/10/2020-due    Family and Social history reviewed and is unchanged from 5/12/2020      ROS:  Review of Systems   Constitutional: Positive for unexpected weight change. Negative for appetite change and fever.   HENT: Negative for nosebleeds.    Eyes: Negative for visual disturbance.   Respiratory: Negative for cough, chest tightness and shortness of breath.    Cardiovascular: Negative for chest pain.   Gastrointestinal: Negative for abdominal pain, blood in stool and diarrhea.   Genitourinary: Negative for frequency and hematuria.   Musculoskeletal: Negative for back pain.   Skin: Negative for rash.   Neurological: Negative for headaches.   Hematological: Negative for adenopathy. Does not bruise/bleed easily.   Psychiatric/Behavioral: The patient is not nervous/anxious.           Current Outpatient Medications:     albuterol (PROVENTIL/VENTOLIN HFA) 90 mcg/actuation inhaler, Inhale 2 puffs into the lungs every 4 to 6 hours as needed for Shortness of Breath. Rescue, Disp: 18 g, Rfl: 3    apixaban (ELIQUIS) 5 mg Tab, Take 1 tablet (5 mg  total) by mouth 2 (two) times daily., Disp: 30 tablet, Rfl: 2    colchicine (COLCRYS) 0.6 mg tablet, Take 1 tablet (0.6 mg total) by mouth once daily., Disp: 30 tablet, Rfl: 2    dexAMETHasone (DECADRON) 4 MG Tab, Take 4 mg by mouth every 12 (twelve) hours., Disp: , Rfl:     fexofenadine (ALLEGRA) 180 MG tablet, Take 180 mg by mouth once daily., Disp: , Rfl:     folic acid (FOLVITE) 1 MG tablet, Take 1 tablet (1 mg total) by mouth once daily., Disp: 100 tablet, Rfl: 3    ondansetron (ZOFRAN) 8 MG tablet, Take 1 tablet (8 mg total) by mouth every 8 (eight) hours as needed for Nausea., Disp: 30 tablet, Rfl: 5    pantoprazole (PROTONIX) 40 MG tablet, Take 1 tablet (40 mg total) by mouth once daily., Disp: 30 tablet, Rfl: 2    promethazine (PHENERGAN) 25 MG tablet, Take 25 mg by mouth every 6 (six) hours as needed for Nausea., Disp: , Rfl:         Objective:       Physical Examination:     /72   Pulse 62   Temp 98.3 °F (36.8 °C)   Resp 19   Wt 109.8 kg (242 lb)   BMI 31.07 kg/m²     Physical Exam  Vitals signs reviewed.   Constitutional:       Appearance: He is well-developed.   HENT:      Head: Normocephalic and atraumatic.      Right Ear: External ear normal.      Left Ear: External ear normal.   Eyes:      General: No scleral icterus.     Conjunctiva/sclera: Conjunctivae normal.      Pupils: Pupils are equal, round, and reactive to light.   Neck:      Musculoskeletal: Normal range of motion and neck supple.   Cardiovascular:      Rate and Rhythm: Normal rate and regular rhythm.      Heart sounds: Normal heart sounds. No murmur. No friction rub. No gallop.    Pulmonary:      Effort: Pulmonary effort is normal. No respiratory distress.      Breath sounds: Normal breath sounds. No rales.   Chest:      Chest wall: No tenderness.   Abdominal:      General: Bowel sounds are normal. There is no distension.      Palpations: Abdomen is soft. There is no mass.      Tenderness: There is no abdominal  tenderness. There is no guarding or rebound.   Lymphadenopathy:      Head:      Right side of head: No tonsillar adenopathy.      Left side of head: No tonsillar adenopathy.      Cervical: No cervical adenopathy.      Upper Body:      Right upper body: No supraclavicular adenopathy.      Left upper body: No supraclavicular adenopathy.   Neurological:      Mental Status: He is alert and oriented to person, place, and time.   Psychiatric:         Behavior: Behavior normal.         Thought Content: Thought content normal.         Judgment: Judgment normal.         Labs:   No results found for this or any previous visit (from the past 336 hour(s)).  CMP  Sodium   Date Value Ref Range Status   06/18/2020 138 136 - 145 mmol/L Final     Potassium   Date Value Ref Range Status   06/18/2020 3.8 3.5 - 5.1 mmol/L Final     Chloride   Date Value Ref Range Status   06/18/2020 99 95 - 110 mmol/L Final     CO2   Date Value Ref Range Status   06/18/2020 27 23 - 29 mmol/L Final     Glucose   Date Value Ref Range Status   06/18/2020 103 70 - 110 mg/dL Final     BUN, Bld   Date Value Ref Range Status   06/18/2020 14 8 - 23 mg/dL Final     Creatinine   Date Value Ref Range Status   06/18/2020 1.0 0.5 - 1.4 mg/dL Final     Calcium   Date Value Ref Range Status   06/18/2020 8.6 (L) 8.7 - 10.5 mg/dL Final     Total Protein   Date Value Ref Range Status   06/18/2020 6.3 6.0 - 8.4 g/dL Final     Albumin   Date Value Ref Range Status   06/18/2020 2.7 (L) 3.5 - 5.2 g/dL Final     Total Bilirubin   Date Value Ref Range Status   06/18/2020 0.4 0.1 - 1.0 mg/dL Final     Comment:     For infants and newborns, interpretation of results should be based  on gestational age, weight and in agreement with clinical  observations.  Premature Infant recommended reference ranges:  Up to 24 hours.............<8.0 mg/dL  Up to 48 hours............<12.0 mg/dL  3-5 days..................<15.0 mg/dL  6-29 days.................<15.0 mg/dL       Alkaline  Phosphatase   Date Value Ref Range Status   06/18/2020 56 55 - 135 U/L Final     AST   Date Value Ref Range Status   06/18/2020 14 10 - 40 U/L Final     ALT   Date Value Ref Range Status   06/18/2020 16 10 - 44 U/L Final     Anion Gap   Date Value Ref Range Status   06/18/2020 12 8 - 16 mmol/L Final     eGFR if    Date Value Ref Range Status   06/18/2020 >60.0 >60 mL/min/1.73 m^2 Final     eGFR if non    Date Value Ref Range Status   06/18/2020 >60.0 >60 mL/min/1.73 m^2 Final     Comment:     Calculation used to obtain the estimated glomerular filtration  rate (eGFR) is the CKD-EPI equation.        No results found for: CEA  Lab Results   Component Value Date    PSA 0.53 05/14/2010    PSA 0.44 12/16/2008    PSA 0.3 11/03/2007           Assessment/Plan:     Problem List Items Addressed This Visit     Malignant neoplasm of upper lobe of right lung     Mr. Do is doing well on carbo/pem/pem and will be due for cycle four next week.  I will arrange for pet scanning to be done to check on activity of this regimen.  Will likely switch to Alimta/Key after this cycle and discussed this today.           Relevant Orders    NM PET CT Routine Skull to Mid Thigh    Subclavian vein thrombosis, right     Patient remains on Eliquis at this time and is doing well.  His platelet count has not fallen below 100 but will continue to monitor this potential complication with weekly labs.                Discussion:     Follow up in about 3 weeks (around 8/27/2020).      Electronically signed by Blake Hartman

## 2020-08-22 PROBLEM — R06.02 SOB (SHORTNESS OF BREATH): Status: ACTIVE | Noted: 2020-01-01

## 2020-08-22 NOTE — ED NOTES
PT PRESENTS TO ED WITH C/O RIGHT SIDED CHEST PAIN, STATES HE HAD A DRAIN IN RIGHT CHEST PLUERAL SPACE TO DRAIN FLUID DUE TO LUNG CANCER, HAS BEEN HAVING PAIN IN THAT AREA LATELY. NAD NOTED, CALL LIGHT IN REACH, AAOX 3, WILL CONT TO MONITOR.

## 2020-08-22 NOTE — ED PROVIDER NOTES
Encounter Date: 8/22/2020       History     Chief Complaint   Patient presents with    RT SIDE RIB PAIN     PAIN WITH RESPIRATION    Shortness of Breath     2-3 DAYS     75-year-old male presented emergency department with right-sided chest wall pain.  Patient has history of lung cancer with previous history of blood clot in the right arm and also currently getting chemothera pleural catheter for about 2 months and finally which was drained and had day py.  Patient recently had right pleural effusion secretions decreased so it was removed.  Patient over the past month did not have that catheter.  Over the past 3-4 days started getting more short of breath and more pain on the right lower rib area.  Denies dysuria or hematuria or fever or chills or abdominal pain or weakness or numbness.  Patient currently getting chemotherapy.  Patient describes this chest pain as sharp pain in the right chest which is worse with movement and with inspiration along with shortness of breath worse over the past 3 days        Review of patient's allergies indicates:  No Known Allergies  Past Medical History:   Diagnosis Date    Adenocarcinoma of lung 04/2020    Arm vein blood clot, right     Back pain     GERD (gastroesophageal reflux disease)     GERD (gastroesophageal reflux disease)     Gout     [place] (hard of hearing)     Pleural effusion on right 04/2020    SOB (shortness of breath)      Past Surgical History:   Procedure Laterality Date    BACK SURGERY  1975    ESOPHAGOGASTRODUODENOSCOPY N/A 6/18/2020    Procedure: EGD (ESOPHAGOGASTRODUODENOSCOPY);  Surgeon: Nisha Curry MD;  Location: [facility] ENDO;  Service: Endoscopy;  Laterality: N/A;    hernina repair      INSERTION OF PLEURAL CATHETER Right 05/2020    pleur X Cath    INSERTION OF TUNNELED CENTRAL VENOUS CATHETER (CVC) WITH SUBCUTANEOUS PORT N/A 5/20/2020    Procedure: [id number];  Surgeon: Yoli Rivas MD;  Location: [facility] OR;  Service:  General;  Laterality: N/A;    LUMBAR SPINE SURGERY      L3-l4    THORACENTESIS Right 2020    THORACENTESIS Right 2020    Procedure: PLEUREX REMOVAL;  Surgeon: Vincent Torres MD;  Location[facility] OR;  Service: Pulmonary;  Laterality: Right;     Family History   Problem Relation Age of Onset    Allergic rhinitis Neg Hx     Allergies Neg Hx     Angioedema Neg Hx     Asthma Neg Hx     Eczema Neg Hx     Immunodeficiency Neg Hx      Social History     Tobacco Use    Smoking status: Former Smoker     Packs/day: 2.00     Start date:      Quit date: 1988     Years since quittin.0   Substance Use Topics    Alcohol use: Never     Frequency: Never    Drug use: Never     Review of Systems   Constitutional: Negative.    HENT: Negative.    Eyes: Negative.    Respiratory: Positive for shortness of breath.    Cardiovascular: Positive for chest pain.   Gastrointestinal: Negative.  Negative for abdominal pain, blood in stool, constipation, diarrhea, nausea, rectal pain and vomiting.   Endocrine: Negative.    Genitourinary: Negative.    Musculoskeletal: Negative.    Skin: Negative.    Allergic/Immunologic: Negative.    Neurological: Negative.    Hematological: Negative.    Psychiatric/Behavioral: Negative for agitation.   All other systems reviewed and are negative.      Physical Exam     Initial Vitals [20 1040]   BP Pulse Resp Temp SpO2   124/77 70 20 99.2 °F (37.3 °C) 98 %      MAP       --         Physical Exam    Nursing note and vitals reviewed.  Constitutional: He appears well-developed and well-nourished.   HENT:   Head: Normocephalic and atraumatic.   Nose: Nose normal.   Eyes: Conjunctivae and EOM are normal.   Neck: Normal range of motion. No tracheal deviation present.   Cardiovascular: Normal rate, regular rhythm, normal heart sounds and intact distal pulses. Exam reveals no friction rub.    No murmur heard.  Pulmonary/Chest: Breath sounds normal. No respiratory distress. He has no  wheezes. He has no rales. He exhibits tenderness.   Decreased breath sounds on the right lower chest area   Abdominal: Soft. He exhibits no distension. There is no abdominal tenderness.   Musculoskeletal: Normal range of motion.   Neurological: He is alert and oriented to person, place, and time. He has normal strength.   Skin: Skin is warm and dry. Capillary refill takes less than 2 seconds.   Psychiatric: He has a normal mood and affect. Thought content normal.         ED Course   Procedures  Labs Reviewed   CBC W/ AUTO DIFFERENTIAL - Abnormal; Notable for the following components:       Result Value    WBC 2.16 (*)     RBC 2.90 (*)     Hemoglobin 8.1 (*)     Hematocrit 25.6 (*)     Mean Corpuscular Hemoglobin Conc 31.6 (*)     RDW 19.5 (*)     Platelets 97 (*)     Gran # (ANC) 1.0 (*)     Lymph # 0.7 (*)     Mono% 18.5 (*)     All other components within normal limits   COMPREHENSIVE METABOLIC PANEL - Abnormal; Notable for the following components:    Albumin 3.3 (*)     All other components within normal limits   B-TYPE NATRIURETIC PEPTIDE - Abnormal; Notable for the following components:     (*)     All other components within normal limits   CULTURE, BLOOD   CULTURE, BLOOD   TROPONIN I   TROPONIN I   LACTIC ACID, PLASMA   SARS-COV-2 RNA AMPLIFICATION, QUAL     EKG Readings: (Independently Interpreted)   Initial Reading: No STEMI. Rhythm: Normal Sinus Rhythm. Ectopy: No Ectopy. Conduction: Normal.     ECG Results          EKG 12-lead (In process)  Result time 08/22/20 11:19:13    In process by Interface, Lab In [facility] (08/22/20 11:19:13)                 Narrative:    Test Reason : R07.9,    Vent. Rate : 056 BPM     Atrial Rate : 113 BPM     P-R Int : 192 ms          QRS Dur : 084 ms      QT Int : 398 ms       P-R-T Axes : 059 040 054 degrees     QTc Int : 384 ms    Sinus tachycardia with 2nd degree A-V block with 2:1 A-V conduction  Abnormal ECG  When compared with ECG of 01-MAY-2020 10:21,  Sinus  rhythm is now with 2nd degree A-V block    Referred By: [email or web address]   SELF           Confirmed By:                             Imaging Results          CTA Chest Non-Coronary (PE Study) (Final result)  Result time 08/22/20 15:16:10    Final result by Paul Arzate Jr., MD (08/22/20 15:16:10)                 Narrative:    CMS MANDATED QUALITY DATA-CT RADIATION-436    HISTORY: Patient document history of chest pain. High probably  pulmonary embolism..    TECHNIQUE: Thin axial imaging through the chest was performed with 100  IV contrast, with sagittal and coronal images, MIPS, and or 3D  reconstructions performed. All CT exams at this facility use dose  modulation, iterative reconstruction, and or weight based dosing when  appropriate to reduce radiation dose to as low as reasonably  achievable.    Findings: The examination is of diagnostic quality as there is  evidence of opacification of the entire pulmonary tree out to the  tertiary order branch vessels with enhancing sparing the aorta as well  as the cardiac chambers. The main pulmonary artery maintain normal  caliber without evidence of any significant widening expansion nor  significant sagittal embolus. Both right and left pulmonary arteries  and respective divisions maintain normal caliber and outline without  evidence of any intraluminal filling defects suggestive of acute  pulmonary embolism. No evidence of webbing, truncation, or pruning of  the pulmonary vascularity. Right-sided IJ catheter enters from a  jugular approach imaging of the lower SVC/RA junction. The aorta is  abnormal caliber density evidence of prominent atherosclerotic plaque  formation over the aortic arch. Centrilobular emphysema changes in the  upper lobes bilaterally. A few benign reactive nodes in the precarinal  and subcarinal station largest single node in the subcarinal station  approaching 17 mm in size. Tracheal lumen is midline without evidence  of any significant endobronchial  lesions. Right and left mainstem  bronchus are widely patent. No significant right ventricular strain  pattern. The pericardial space appears negative for any significant  effusion. Moderate pericardial thickening of post lateral edge of the  pericardial space towards the right pericardial surface. Encapsulated  right pleural effusion extending from base to apex with moderate rind  of pleural thickening. There is moderate and atelectasis. There is  evidence of linear band of pulmonary scar formation occupying the  right diaphragmatic surface. Localized evidence of pulmonary scar  formation the right upper lobe marginating the pleural mediastinal  space. Is evidence of a small focus causing changes along the right  perimediastinal border. Mild bilateral apical pleural thickening. The  osseous windows are noted for moderate small is changes throughout the  thoracic spine is nominal in volume.    IMPRESSION:  1. Partially loculated pleural effusion on the right side extending  from base to apex reproducing minor subjacent atelectasis.  2.. Centrilobular emphysematous changes.  3. Localized zone of pulmonary consolidation marginating the right  paramediastinal space. As well as the right upper lobe posteriorly  that is nominal in volume.  4. Ectatic aorta with evidence of scattered atheromatous  calcifications. No acute changes.  2.    Electronically Signed by Paul OLSON on 8/22/2020 3:28 PM                             X-Ray Chest PA And Lateral (Final result)  Result time 08/22/20 12:44:03   Procedure changed from X-Ray Chest AP Portable     Final result by Paul Arzate Jr., MD (08/22/20 12:44:03)                 Narrative:    Examination: Chest 2 views.    CLINICAL HISTORY: Right-sided chest pain. Lung carcinoma.    COMPARISON: 8/12/2020 at 4:10 PM.    FINDINGS: The pulmonary vascularity is within normal limits the  cardiac knees tonsil with evidence well-maintained. Right-sided  Mediport catheter is  identified distal tip located at the RA/SVC  junction. There is evidence of ARMANDO linear opacities overlying the  lower lung zone. Currently diaphragmatic surface. Blunted right  costophrenic angle is likely obscured on a chronic basis. There is no  evidence of confluent infiltrate or significant masses. There is  evidence of shifting of the descending towards the right due to  postoperative changes being suspected.    IMPRESSION:  1. No evidence of adverse change when compared the patient's prior  imaging study of 8/12/2020.  2. Stable volume loss of the right hemithorax with minimal shift  involving the central mediastinum.  3. Chronic lung changes in the basilar segments of the right lower  lobe.    Electronically Signed by Paul OLSON on 8/22/2020 12:49 PM                               Medical Decision Making:   Differential Diagnosis:   75-year-old male presented emergency department with right-sided chest wall pain and shortness of breath and pleuritic pain.  Patient had history of pleural effusion and has history of lung cancer and currently getting chemotherapy.  Given this presentation CT scan of the chest done which showed pleural effusion which likely is malignant pleural effusion and pleural effusion is loculated.  Patient also has consolidation in areas of the right lung and given patient's history of chemotherapy will treat with broad-spectrum antibiotic.  Clinically I do not believe patient has pneumonia however could not rule it out.  Patient not hypoxic and hemodynamically stable and had pain controlled in the emergency department.  Hospital Medicine consulted for evaluation for admission and further management.  Clinical Tests:   Lab Tests: Reviewed  Radiological Study: Reviewed  Medical Tests: Reviewed                                 Clinical Impression:       ICD-10-CM ICD-9-CM   1. SOB (shortness of breath)  R06.02 786.05   2. Chest pain  R07.9 786.50   3. Malignant pleural effusion   J91.0 511.81   4. Pneumonia due to aerobic bacteria  J15.8 482.89             ED Disposition Condition    Admit                           Iggy Ellison MD  08/22/20 [id number]

## 2020-08-23 PROBLEM — H91.90 HOH (HARD OF HEARING): Chronic | Status: ACTIVE | Noted: 2020-01-01

## 2020-08-23 PROBLEM — R00.1 BRADYCARDIA: Status: ACTIVE | Noted: 2020-01-01

## 2020-08-23 PROBLEM — D61.811 DRUG-INDUCED PANCYTOPENIA: Chronic | Status: ACTIVE | Noted: 2020-01-01

## 2020-08-23 PROBLEM — R00.1 BRADYCARDIA: Status: RESOLVED | Noted: 2020-01-01 | Resolved: 2020-01-01

## 2020-08-23 PROBLEM — K59.00 CONSTIPATION: Chronic | Status: RESOLVED | Noted: 2020-01-01 | Resolved: 2020-01-01

## 2020-08-23 PROBLEM — K59.00 CONSTIPATION: Chronic | Status: ACTIVE | Noted: 2020-01-01

## 2020-08-23 PROBLEM — J90 PLEURAL EFFUSION: Chronic | Status: ACTIVE | Noted: 2020-01-01

## 2020-08-23 PROBLEM — K21.9 GERD (GASTROESOPHAGEAL REFLUX DISEASE): Chronic | Status: ACTIVE | Noted: 2020-01-01

## 2020-08-23 NOTE — DISCHARGE SUMMARY
[facility] Medicine  Discharge Summary      Patient Name: Anson Do  MRN: [id number]  Admission Date: 8/22/2020  Hospital Length of Stay: 0 days  Discharge Date and Time:  08/23/2020 11:13 AM  Attending Physician: Jessica Barrow MD   Discharging Provider: Jessica Barrow MD  Primary Care Provider: Vincent Torres MD      HPI:   HPI as per NP Nondenominational:  Anson Do is a 75 y.o. White male who  has a past medical history of Adenocarcinoma of lung (04/2020), Arm vein blood clot, right, Back pain, GERD (gastroesophageal reflux disease), GERD (gastroesophageal reflux disease), Gout, [place] (hard of hearing), Pleural effusion on right (04/2020), and SOB (shortness of breath).. The patient presented to [facility] on 8/22/2020 with a primary complaint of RT SIDE RIB PAIN (PAIN WITH RESPIRATION) and Shortness of Breath (2-3 DAYS)     History was obtained from the patient ER physician Sign-out. Patient presents to the ED with the complaint of right lateral chest pain. The pain radiates up to his right shoulder. Pain is worse with deep breathing and movement. No alleviating factors. He reports he has lung cancer and is currently under chemotherapy treatment with Dr. Donovan. He had a right pleurex cath placed back in May secondary to malignant pleural effusions, and he had it removed about a month ago after it stopped draining. He reports that over the past few 3-4 days he has been having progressively worsening pain and some SOB. He denies fever, chills, productive cough, nausea, vomiting, abdominal pain, dizziness, lightheadedness, or LOC.         In the emergency room, patient VSS. Afebrile. CBC shows pancytopenia. CMP was unremarkable. BNP and troponin within reference ranges. CTA of the chest shows partially loculated pleural effusion on the right side extending from base to apex. Also shows areas of consolidation in right lung. The patient is treated with IV antibiotics.       Decision to admit was taken and patient was informed about the plan of care.    * No surgery found *      Hospital Course:   I, Dr Barrow, assumed care of this patient on 8/23/20. Patient with known history of stage IV right upper lung adenocarcinoma, followed by Dr. Donovan, currently undergoing chemotherapy with last dose received about 2 weeks prior, known recurrent malignant right pleural effusion with previous right PleurX catheter, placed and removed (about 1 month ago) by Dr. Torres- pulmonary, presenting with worsening shortness of breath associated with right-sided chest discomfort worse with deep inspiration.  Patient states since removal of the PleurX catheter he felt fluid was reaccumulating, he was seen by pulmonologist and repeat chest x-ray was obtained.  Noted to be bradycardic on arrival vitals however he states he had history of same, EKG with 2-1 heart block, troponin by 3-, repeat EKG with sinus rhythm with PACs, pancytopenia, albumin 3.1, lactic acid 0.8, procalcitonin negative.  CTA without any evidence of PE however did report partially loculated right pleural effusion extending from the base to the apex with centrilobular emphysema.  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  He was admitted and placed on IV Levaquin with IR consulted for thoracocentesis, pulmonary has also been consulted.  On 08/23, clinically stable, not requiring supplemental oxygen, seen by Dr. Voss from Pulmonary who recommended against thoracocentesis and recommended discharge home with outpatient follow-up with pulmonologist Dr. Torres and ongoing chemotherapy as felt symptomatology of shortness of breath likely related to ongoing lung malignancy rather than pleural effusion.  Patient was counseled about diagnosis and workup, all questions and concerns were addressed.  Medically stable for discharge.  No objection to discharge.  Discharge plan including medications, follow-up as well as return  precautions explained on discharge.    Discharge examination  Lying in bed no apparent distress cooperative  Regular heart rhythm, no lower extremity edema  Not on supplemental oxygen, no wheeze, not using accessory muscles, speaking in full sentences     Consults:   Consults (From admission, onward)        Status Ordering Provider     Inpatient consult to Hospitalist  Once     Provider:  Patience Mena NP    Acknowledged DELVIN REIS     Inpatient consult to Pulmonology  Once     Provider:  Vincent Torres MD    Completed PATIENCE MENA          * Recurrent right pleural effusion, malignant  Known history of malignant right pleural effusion status post PleurX catheter placement and recent removal by Dr. Torres.  Now presenting with worsening shortness of breath with right-sided pleuritic chest pain.  CTA without any evidence of PE however does report partially loculated right pleural effusion extending from the base to the apex.  Procalcitonin negative, seen by Pulmonary who fell symptomatology related to lung cancer rather than pleural effusion, recommended against thoracocentesis.  Discharged to follow-up with outpatient pulmonologist and to continue chemotherapy.    Drug-induced pancytopenia  Likely due to cancer and associated chemotherapy.      Constipation  Patient states he suffers from chronic intermittent constipation, continue MiraLax and docusate    [place] (hard of hearing)        GERD (gastroesophageal reflux disease)        Subclavian vein thrombosis, right  Known history right subclavian vein DVT, on Eliquis .        Final Active Diagnoses:    Diagnosis Date Noted POA    PRINCIPAL PROBLEM:  Recurrent right pleural effusion, malignant [J90] 04/20/2020 Yes     Chronic    Malignant neoplasm of upper lobe of right lung [C34.11] 05/12/2020 Yes    Drug-induced pancytopenia [D61.811] 08/23/2020 Yes     Chronic    GERD (gastroesophageal reflux disease) [K21.9] 08/23/2020 Yes     Chronic    [place] (hard of  hearing) [H91.90] 08/23/2020 Yes     Chronic    Constipation [K59.00] 08/23/2020 Yes     Chronic    Subclavian vein thrombosis, right [I82.B11] 06/17/2020 Yes      Problems Resolved During this Admission:    Diagnosis Date Noted Date Resolved POA    Bradycardia with 2-1 conduction [R00.1] 08/23/2020 08/23/2020 Yes       Discharged Condition: good    Disposition: Home or Self Care    Follow Up:  Follow-up Information     Vincent Torres MD. Schedule an appointment as soon as possible for a visit in 2 weeks.    Specialties: Hospitalist, Pulmonary Disease  Why: Post hospital follow-up  Contact information:  [address]  [address]  [place] [address]  [phone]             Blake Hartman MD. Schedule an appointment as soon as possible for a visit in 1 week.    Specialties: Hematology, Oncology, Hematology and Oncology  Why: Lung cancer, post hospital follow-up  Contact information:  [address]  [address]  [place] [address]  [phone]                 Patient Instructions:      Diet Adult Regular     Notify your health care provider if you experience any of the following:  temperature >100.4     Notify your health care provider if you experience any of the following:  redness, tenderness, or signs of infection (pain, swelling, redness, odor or green/yellow discharge around incision site)     Notify your health care provider if you experience any of the following:  difficulty breathing or increased cough     Activity as tolerated       Significant Diagnostic Studies: Labs:   BMP:   Recent Labs   Lab 08/22/20  1121 08/23/20  0318    129*    140   K 4.0 4.3    105   CO2 27 27   BUN 16 13   CREATININE 1.0 0.9   CALCIUM 9.2 9.1   MG  --  1.9   , CMP   Recent Labs   Lab 08/22/20  1121 08/23/20  0318    140   K 4.0 4.3    105   CO2 27 27    129*   BUN 16 13   CREATININE 1.0 0.9   CALCIUM 9.2 9.1   PROT 7.2 6.7   ALBUMIN 3.3* 3.1*   BILITOT 0.4 0.4   ALKPHOS 86 81   AST 17  15   ALT 13 10   ANIONGAP 10 8   ESTGFRAFRICA >60.0 >60.0   EGFRNONAA >60.0 >60.0   , CBC   Recent Labs   Lab 08/22/20  1121 08/23/20  0318   WBC 2.16* 2.35*   HGB 8.1* 8.4*   HCT 25.6* 25.9*   PLT 97* 92*   , INR   Lab Results   Component Value Date    INR 1.3 08/22/2020    INR 1.2 06/17/2020    INR 1.2 05/29/2020   , Lipid Panel   Lab Results   Component Value Date    CHOL 202 (H) 05/14/2010    HDL 37 (L) 05/14/2010    LDLCALC 141.6 (H) 05/14/2010    TRIG 117 05/14/2010    CHOLHDL 18.3 (L) 05/14/2010   , Troponin   Recent Labs   Lab 08/23/20 0318   TROPONINI <0.030   , A1C: No results for input(s): HGBA1C in the last 4320 hours. and All labs within the past 24 hours have been reviewed    Pending Diagnostic Studies:     None       X-ray Chest Pa And Lateral    Result Date: 8/22/2020  Examination: Chest 2 views. CLINICAL HISTORY: Right-sided chest pain. Lung carcinoma. COMPARISON: 8/12/2020 at 4:10 PM. FINDINGS: The pulmonary vascularity is within normal limits the cardiac knees tonsil with evidence well-maintained. Right-sided Mediport catheter is identified distal tip located at the RA/SVC junction. There is evidence of ARMANDO linear opacities overlying the lower lung zone. Currently diaphragmatic surface. Blunted right costophrenic angle is likely obscured on a chronic basis. There is no evidence of confluent infiltrate or significant masses. There is evidence of shifting of the descending towards the right due to postoperative changes being suspected. IMPRESSION: 1. No evidence of adverse change when compared the patient's prior imaging study of 8/12/2020. 2. Stable volume loss of the right hemithorax with minimal shift involving the central mediastinum. 3. Chronic lung changes in the basilar segments of the right lower lobe. Electronically Signed by Paul OLSON on 8/22/2020 12:49 PM    X-ray Chest Pa And Lateral    Result Date: 8/12/2020  Chest, 2 views HISTORY: Pleural effusion, previous thoracentesis.  Comparison is made 7/27/2020 and 7/22/2020. The cardiomediastinal silhouette and pulmonary vasculature are within normal limits. A port type right subclavian central venous catheter remains unchanged in position. Mild predominantly linear parenchymal opacities persist in the right lower lung zone. There is unchanged pleural based opacity observed posteriorly within the right lower thorax, best demonstrated on the lateral projection. No new infiltrate or volume loss is observed. No significant effusion is demonstrated. IMPRESSION: Unchanged right lower lung zone pleural-parenchymal opacities. Electronically Signed by Roland Adam M.D. on 8/12/2020 4:26 PM    Cta Chest Non-coronary (pe Study)    Result Date: 8/22/2020  CMS MANDATED QUALITY DATA-CT RADIATION-436 HISTORY: Patient document history of chest pain. High probably pulmonary embolism.. TECHNIQUE: Thin axial imaging through the chest was performed with 100 IV contrast, with sagittal and coronal images, MIPS, and or 3D reconstructions performed. All CT exams at this facility use dose modulation, iterative reconstruction, and or weight based dosing when appropriate to reduce radiation dose to as low as reasonably achievable. Findings: The examination is of diagnostic quality as there is evidence of opacification of the entire pulmonary tree out to the tertiary order branch vessels with enhancing sparing the aorta as well as the cardiac chambers. The main pulmonary artery maintain normal caliber without evidence of any significant widening expansion nor significant sagittal embolus. Both right and left pulmonary arteries and respective divisions maintain normal caliber and outline without evidence of any intraluminal filling defects suggestive of acute pulmonary embolism. No evidence of webbing, truncation, or pruning of the pulmonary vascularity. Right-sided IJ catheter enters from a jugular approach imaging of the lower SVC/RA junction. The aorta is abnormal  caliber density evidence of prominent atherosclerotic plaque formation over the aortic arch. Centrilobular emphysema changes in the upper lobes bilaterally. A few benign reactive nodes in the precarinal and subcarinal station largest single node in the subcarinal station approaching 17 mm in size. Tracheal lumen is midline without evidence of any significant endobronchial lesions. Right and left mainstem bronchus are widely patent. No significant right ventricular strain pattern. The pericardial space appears negative for any significant effusion. Moderate pericardial thickening of post lateral edge of the pericardial space towards the right pericardial surface. Encapsulated right pleural effusion extending from base to apex with moderate rind of pleural thickening. There is moderate and atelectasis. There is evidence of linear band of pulmonary scar formation occupying the right diaphragmatic surface. Localized evidence of pulmonary scar formation the right upper lobe marginating the pleural mediastinal space. Is evidence of a small focus causing changes along the right perimediastinal border. Mild bilateral apical pleural thickening. The osseous windows are noted for moderate small is changes throughout the thoracic spine is nominal in volume. IMPRESSION: 1. Partially loculated pleural effusion on the right side extending from base to apex reproducing minor subjacent atelectasis. 2.. Centrilobular emphysematous changes. 3. Localized zone of pulmonary consolidation marginating the right paramediastinal space. As well as the right upper lobe posteriorly that is nominal in volume. 4. Ectatic aorta with evidence of scattered atheromatous calcifications. No acute changes. 2. Electronically Signed by Paul OLSON on 8/22/2020 3:28 PM    X-ray Chest Ap Portable    Result Date: 7/27/2020  Portable chest x-ray at 11:13 AM is compared to prior study 8 7/22/2020 Clinical history is removal of right Pleurx catheter  There is a right subclavian vein Port-A-Cath with tip at the caval junction. There is no pneumothorax status post right Pleurx catheter removal. There is a tiny right pleural effusion with right lower lobe airspace disease. The right upper lobe and left lung are clear. The cardiomediastinal silhouette is normal in size. There are mild degenerative changes of the right shoulder. IMPRESSION: Removal of the right Pleurx catheter without pneumothorax. There is a tiny residual right pleural effusion with right lower lobe atelectasis or infiltrate Electronically Signed by Radha Fitzgerald M.D. on 7/27/2020 11:26 AM    Medications:  Reconciled Home Medications:      Medication List      CONTINUE taking these medications    albuterol 90 mcg/actuation inhaler  Commonly known as: PROVENTIL/VENTOLIN HFA  Inhale 2 puffs into the lungs every 4 to 6 hours as needed for Shortness of Breath. Rescue     apixaban 5 mg Tab  Commonly known as: ELIQUIS  Take 1 tablet (5 mg total) by mouth 2 (two) times daily.     colchicine 0.6 mg tablet  Commonly known as: COLCRYS  Take 1 tablet (0.6 mg total) by mouth once daily.     dexAMETHasone 4 MG Tab  Commonly known as: DECADRON  Take 4 mg by mouth every 12 (twelve) hours.     docusate sodium 100 MG capsule  Commonly known as: COLACE  Take 200 mg by mouth every evening.     fexofenadine 180 MG tablet  Commonly known as: ALLEGRA  Take 180 mg by mouth once daily.     folic acid 1 MG tablet  Commonly known as: FOLVITE  Take 1 tablet (1 mg total) by mouth once daily.     magnesium 250 mg Tab  Take 1 tablet by mouth every evening.     ondansetron 8 MG tablet  Commonly known as: ZOFRAN  Take 1 tablet (8 mg total) by mouth every 8 (eight) hours as needed for Nausea.     pantoprazole 40 MG tablet  Commonly known as: PROTONIX  Take 1 tablet (40 mg total) by mouth once daily.     polyethylene glycol 17 gram/dose powder  Commonly known as: GLYCOLAX  Take 17 g by mouth once daily.     promethazine 25 MG  tablet  Commonly known as: PHENERGAN  Take 25 mg by mouth every 6 (six) hours as needed for Nausea.            Indwelling Lines/Drains at time of discharge:   Lines/Drains/Airways     Central Venous Catheter Line            Port A Cath Single Lumen 05/20/20  right subclavian 95 days          Drain                 Closed/Suction Drain 05/04/20 1345 Right;Lateral Chest Other (Comment) 110 days                Time spent on the discharge of patient: 32 minutes  Patient was seen and examined on the date of discharge and determined to be suitable for discharge.         Jessica Barrow MD  Department of Hospital Medicine  [facility]

## 2020-08-23 NOTE — HOSPITAL COURSE
ADRIEN, Dr Barrow, assumed care of this patient on 8/23/20. Patient with known history of stage IV right upper lung adenocarcinoma, followed by Dr. Donovan, currently undergoing chemotherapy with last dose received about 2 weeks prior, known recurrent malignant right pleural effusion with previous right PleurX catheter, placed and removed (about 1 month ago) by Dr. Torres- marcial, presenting with worsening shortness of breath associated with right-sided chest discomfort worse with deep inspiration.  Patient states since removal of the PleurX catheter he felt fluid was reaccumulating, he was seen by pulmonologist and repeat chest x-ray was obtained.  Noted to be bradycardic on arrival vitals however he states he had history of same, EKG with 2-1 heart block, troponin by 3-, repeat EKG with sinus rhythm with PACs, pancytopenia, albumin 3.1, lactic acid 0.8, procalcitonin negative.  CTA without any evidence of PE however did report partially loculated right pleural effusion extending from the base to the apex with centrilobular emphysema.  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  He was admitted and placed on IV Levaquin with IR consulted for thoracocentesis, pulmonary has also been consulted.  On 08/23, clinically stable, not requiring supplemental oxygen, seen by Dr. Voss from Pulmonary who recommended against thoracocentesis and recommended discharge home with outpatient follow-up with pulmonologist Dr. Torres and ongoing chemotherapy as felt symptomatology of shortness of breath likely related to ongoing lung malignancy rather than pleural effusion.  Patient was counseled about diagnosis and workup, all questions and concerns were addressed.  Medically stable for discharge.  No objection to discharge.  Discharge plan including medications, follow-up as well as return precautions explained on discharge.    Discharge examination  Lying in bed no apparent distress cooperative  Regular heart rhythm, no lower  extremity edema  Not on supplemental oxygen, no wheeze, not using accessory muscles, speaking in full sentences

## 2020-08-23 NOTE — NURSING
IV removed. Pt tolerated it well.   Pt dced home. Instructions and teaching given. Pt verbalized understanding.   Pt wheeled outside by CNA.

## 2020-08-23 NOTE — HPI
HPI as per NP Jose:  Anson Do is a 75 y.o. White male who  has a past medical history of Adenocarcinoma of lung (04/2020), Arm vein blood clot, right, Back pain, GERD (gastroesophageal reflux disease), GERD (gastroesophageal reflux disease), Gout, [place] (hard of hearing), Pleural effusion on right (04/2020), and SOB (shortness of breath).. The patient presented to [facility] on 8/22/2020 with a primary complaint of RT SIDE RIB PAIN (PAIN WITH RESPIRATION) and Shortness of Breath (2-3 DAYS)     History was obtained from the patient ER physician Sign-out. Patient presents to the ED with the complaint of right lateral chest pain. The pain radiates up to his right shoulder. Pain is worse with deep breathing and movement. No alleviating factors. He reports he has lung cancer and is currently under chemotherapy treatment with Dr. Donovan. He had a right pleurex cath placed back in May secondary to malignant pleural effusions, and he had it removed about a month ago after it stopped draining. He reports that over the past few 3-4 days he has been having progressively worsening pain and some SOB. He denies fever, chills, productive cough, nausea, vomiting, abdominal pain, dizziness, lightheadedness, or LOC.         In the emergency room, patient VSS. Afebrile. CBC shows pancytopenia. CMP was unremarkable. BNP and troponin within reference ranges. CTA of the chest shows partially loculated pleural effusion on the right side extending from base to apex. Also shows areas of consolidation in right lung. The patient is treated with IV antibiotics.      Decision to admit was taken and patient was informed about the plan of care.

## 2020-08-23 NOTE — ASSESSMENT & PLAN NOTE
Known history of right upper lung adenocarcinoma of the lung.  He is followed by Dr. Donovan, currently on chemotherapy with last dose received about 2 weeks ago.

## 2020-08-23 NOTE — ASSESSMENT & PLAN NOTE
Known history of malignant right pleural effusion status post PleurX catheter placement and recent removal by Dr. Torres.  Now presenting with worsening shortness of breath with right-sided pleuritic chest pain.  CTA without any evidence of PE however does report partially loculated right pleural effusion extending from the base to the apex.  Procalcitonin negative, low suspicion infection.  Discontinue empiric antibiotics and monitor  Admission team did consult IR for thoracocentesis needed, Eliquis on hold  Breathing treatments as needed  A.m. labs ordered for review  Pulmonary consulted

## 2020-08-23 NOTE — CONSULTS
Pulmonary/Critical Care Consult      Patient name: Anson Do  MRN: [id number]  Date: 08/23/2020    Admit Date: 8/22/2020  Consult Requested By: Jessica Barrow MD    Reason for Consult: Pleural effusion    HPI:    8/23/2020 - 76 yo male with known stage IV adenocarcinoma of lung has had a right pleurx catheter which was removed a while back.  He is known to Dr Torres and recent CXR have been stable.  He came to ER with some right sided chest discomfort and some increase SOB and was admitted for possible thoracentesis.  I have reviewed CXR which is unchanged since 7/2020 and CT scan which shows a small residual fluid (likely chronic).  Case was discussed with pt at length.  This fluid is NOT causing him any SOB and his chest discomfort is more likely related to his cancer.  I do not see any reason to be admitted for a thoracentesis and have recommended that he go home and contact Dr Torres later this week.  He needs to continue his treatment for his known cancer.    Review of Systems    Review of Systems   Constitutional: Positive for malaise/fatigue. Negative for chills, diaphoresis, fever and weight loss.   HENT: Negative for congestion.    Eyes: Negative for pain.   Respiratory: Positive for cough and shortness of breath. Negative for hemoptysis, sputum production, wheezing and stridor.    Cardiovascular: Positive for chest pain (right sided sharp). Negative for palpitations, orthopnea, claudication, leg swelling and PND.   Gastrointestinal: Negative for abdominal pain, blood in stool, constipation, diarrhea, heartburn, nausea and vomiting.   Genitourinary: Negative for dysuria, frequency, hematuria and urgency.   Musculoskeletal: Negative for falls and myalgias.   Neurological: Negative for dizziness, tingling, tremors, sensory change, speech change, focal weakness, seizures, loss of consciousness, weakness and headaches.   Psychiatric/Behavioral: Negative for depression, substance abuse and suicidal ideas. The  patient is nervous/anxious.        Past Medical History    Past Medical History:   Diagnosis Date    Adenocarcinoma of lung 04/2020    Arm vein blood clot, right     Back pain     GERD (gastroesophageal reflux disease)     GERD (gastroesophageal reflux disease)     Gout     [place] (hard of hearing)     Pleural effusion on right 04/2020    SOB (shortness of breath)        Past Surgical History    Past Surgical History:   Procedure Laterality Date    BACK SURGERY  1975    ESOPHAGOGASTRODUODENOSCOPY N/A 6/18/2020    Procedure: EGD (ESOPHAGOGASTRODUODENOSCOPY);  Surgeon: Nisha Curry MD;  Location: [facility] ENDO;  Service: Endoscopy;  Laterality: N/A;    hernina repair      INSERTION OF PLEURAL CATHETER Right 05/2020    pleur X Cath    INSERTION OF TUNNELED CENTRAL VENOUS CATHETER (CVC) WITH SUBCUTANEOUS PORT N/A 5/20/2020    Procedure: [id number]-[id number];  Surgeon: Yoli Rivas MD;  Location: [facility] OR;  Service: General;  Laterality: N/A;    LUMBAR SPINE SURGERY      L3-l4    THORACENTESIS Right 04/2020    THORACENTESIS Right 7/27/2020    Procedure: PLEUREX REMOVAL;  Surgeon: Vincent Torres MD;  Location: [facility] OR;  Service: Pulmonary;  Laterality: Right;       Medications (scheduled):      cetirizine  10 mg Oral Daily    colchicine  0.6 mg Oral Daily    docusate sodium  200 mg Oral QHS    folic acid  1 mg Oral Daily    pantoprazole  40 mg Oral Before breakfast    polyethylene glycol  17 g Oral Daily       Medications (infusions):         Medications (prn):     acetaminophen, albuterol, calcium chloride IVPB, calcium chloride IVPB, calcium chloride IVPB, HYDROcodone-acetaminophen, magnesium oxide, magnesium sulfate IVPB, magnesium sulfate IVPB, magnesium sulfate IVPB, magnesium sulfate IVPB, melatonin, ondansetron, ondansetron, potassium chloride in water, potassium chloride in water, potassium chloride in water, potassium chloride in water, potassium chloride, potassium chloride, potassium  chloride, potassium chloride, promethazine, sodium chloride 0.9%, sodium phosphate IVPB, sodium phosphate IVPB, sodium phosphate IVPB, sodium phosphate IVPB, sodium phosphate IVPB    Family History:   Family History   Problem Relation Age of Onset    Allergic rhinitis Neg Hx     Allergies Neg Hx     Angioedema Neg Hx     Asthma Neg Hx     Eczema Neg Hx     Immunodeficiency Neg Hx        Social History: Tobacco:   Social History     Tobacco Use   Smoking Status Former Smoker    Packs/day: 2.00    Start date:     Quit date: 1988    Years since quittin.0                                EtOH:   Social History     Substance and Sexual Activity   Alcohol Use Never    Frequency: Never                                Drugs:   Social History     Substance and Sexual Activity   Drug Use Never                                Occupation: na                             Asbestos exposure: na    Physical Exam    Vital signs:  Temp:  [97.9 °F (36.6 °C)-99.2 °F (37.3 °C)]   Pulse:  [49-87]   Resp:  [16-20]   BP: (108-165)/(62-83)   SpO2:  [92 %-98 %]     Intake/Output:     Intake/Output Summary (Last 24 hours) at 2020 1015  Last data filed at 2020 0600  Gross per 24 hour   Intake 500 ml   Output --   Net 500 ml        BMI: Estimated body mass index is 32.79 kg/m² as calculated from the following:    Height as of this encounter: 6' (1.829 m).    Weight as of this encounter: 109.7 kg (241 lb 12.5 oz).    Physical Exam   Constitutional: He is oriented to person, place, and time and well-developed, well-nourished, and in no distress. No distress.   HENT:   Head: Normocephalic and atraumatic.   Right Ear: External ear normal.   Mouth/Throat: Oropharynx is clear and moist.   Eyes: Pupils are equal, round, and reactive to light. Conjunctivae and EOM are normal. Right eye exhibits no discharge. Left eye exhibits no discharge. No scleral icterus.   Neck: Normal range of motion. Neck supple. No JVD present. No  tracheal deviation present. No thyromegaly present.   Cardiovascular: Normal rate, regular rhythm, normal heart sounds and intact distal pulses. Exam reveals no gallop and no friction rub.   No murmur heard.  Pulmonary/Chest: Effort normal and breath sounds normal. No stridor. No respiratory distress. He has no wheezes. He has no rales.   Minimal decrease in BS at right base posteriorly   Abdominal: Soft. He exhibits no distension. There is no abdominal tenderness. There is no rebound.   Musculoskeletal: Normal range of motion.         General: No tenderness or edema.   Neurological: He is alert and oriented to person, place, and time. GCS score is 15.   Skin: Skin is warm and dry. He is not diaphoretic.   Psychiatric: Mood, memory, affect and judgment normal.   Nursing note and vitals reviewed.      Laboratory    Recent Labs   Lab 08/23/20 0318   WBC 2.35*   RBC 2.93*   HGB 8.4*   HCT 25.9*   PLT 92*   MCV 88   MCH 28.7   MCHC 32.4       Recent Labs   Lab 08/23/20 0318   CALCIUM 9.1   PROT 6.7      K 4.3   CO2 27      BUN 13   CREATININE 0.9   ALKPHOS 81   ALT 10   AST 15   BILITOT 0.4       Recent Labs   Lab 08/22/20  1121 08/22/20  1938   INR 1.3  --    APTT  --  28.1       Recent Labs   Lab 08/23/20 0318   TROPONINI <0.030       Additional labs: reviewed    Microbiology:       Microbiology Results (last 7 days)     Procedure Component Value Units Date/Time    Blood culture [[id number]] Collected: 08/22/20 1712    Order Status: Completed Specimen: Blood from Peripheral, Antecubital, Right Updated: 08/23/20 0117     Blood Culture, Routine No Growth to date    Blood culture [[id number]] Collected: 08/22/20 1705    Order Status: Completed Specimen: Blood from Peripheral, Forearm, Left Updated: 08/23/20 0117     Blood Culture, Routine No Growth to date          Radiology    Imaging Results          CTA Chest Non-Coronary (PE Study) (Final result)  Result time 08/22/20 15:16:10    Final result by Paul  ZO Arzate Jr., MD (08/22/20 15:16:10)                 Narrative:    CMS MANDATED QUALITY DATA-CT RADIATION-436    HISTORY: Patient document history of chest pain. High probably  pulmonary embolism..    TECHNIQUE: Thin axial imaging through the chest was performed with 100  IV contrast, with sagittal and coronal images, MIPS, and or 3D  reconstructions performed. All CT exams at this facility use dose  modulation, iterative reconstruction, and or weight based dosing when  appropriate to reduce radiation dose to as low as reasonably  achievable.    Findings: The examination is of diagnostic quality as there is  evidence of opacification of the entire pulmonary tree out to the  tertiary order branch vessels with enhancing sparing the aorta as well  as the cardiac chambers. The main pulmonary artery maintain normal  caliber without evidence of any significant widening expansion nor  significant sagittal embolus. Both right and left pulmonary arteries  and respective divisions maintain normal caliber and outline without  evidence of any intraluminal filling defects suggestive of acute  pulmonary embolism. No evidence of webbing, truncation, or pruning of  the pulmonary vascularity. Right-sided IJ catheter enters from a  jugular approach imaging of the lower SVC/RA junction. The aorta is  abnormal caliber density evidence of prominent atherosclerotic plaque  formation over the aortic arch. Centrilobular emphysema changes in the  upper lobes bilaterally. A few benign reactive nodes in the precarinal  and subcarinal station largest single node in the subcarinal station  approaching 17 mm in size. Tracheal lumen is midline without evidence  of any significant endobronchial lesions. Right and left mainstem  bronchus are widely patent. No significant right ventricular strain  pattern. The pericardial space appears negative for any significant  effusion. Moderate pericardial thickening of post lateral edge of the  pericardial  space towards the right pericardial surface. Encapsulated  right pleural effusion extending from base to apex with moderate rind  of pleural thickening. There is moderate and atelectasis. There is  evidence of linear band of pulmonary scar formation occupying the  right diaphragmatic surface. Localized evidence of pulmonary scar  formation the right upper lobe marginating the pleural mediastinal  space. Is evidence of a small focus causing changes along the right  perimediastinal border. Mild bilateral apical pleural thickening. The  osseous windows are noted for moderate small is changes throughout the  thoracic spine is nominal in volume.    IMPRESSION:  1. Partially loculated pleural effusion on the right side extending  from base to apex reproducing minor subjacent atelectasis.  2.. Centrilobular emphysematous changes.  3. Localized zone of pulmonary consolidation marginating the right  paramediastinal space. As well as the right upper lobe posteriorly  that is nominal in volume.  4. Ectatic aorta with evidence of scattered atheromatous  calcifications. No acute changes.  2.    Electronically Signed by Paul OLSON on 8/22/2020 3:28 PM                             X-Ray Chest PA And Lateral (Final result)  Result time 08/22/20 12:44:03   Procedure changed from X-Ray Chest AP Portable     Final result by Paul Arzate Jr., MD (08/22/20 12:44:03)                 Narrative:    Examination: Chest 2 views.    CLINICAL HISTORY: Right-sided chest pain. Lung carcinoma.    COMPARISON: 8/12/2020 at 4:10 PM.    FINDINGS: The pulmonary vascularity is within normal limits the  cardiac knees tonsil with evidence well-maintained. Right-sided  Mediport catheter is identified distal tip located at the RA/SVC  junction. There is evidence of ARMANDO linear opacities overlying the  lower lung zone. Currently diaphragmatic surface. Blunted right  costophrenic angle is likely obscured on a chronic basis. There is no  evidence  of confluent infiltrate or significant masses. There is  evidence of shifting of the descending towards the right due to  postoperative changes being suspected.    IMPRESSION:  1. No evidence of adverse change when compared the patient's prior  imaging study of 8/12/2020.  2. Stable volume loss of the right hemithorax with minimal shift  involving the central mediastinum.  3. Chronic lung changes in the basilar segments of the right lower  lobe.    Electronically Signed by Paul OLSON on 8/22/2020 12:49 PM                              Additional Studies    reviewed    Ventilator Information              No results for input(s): PH, PCO2, PO2, HCO3, POCSATURATED, BE in the last 72 hours.      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      4/21/2020:  Thoracentesis 2L fluid. Path c/w Adenocarcinoma. CTA shows RUL nodular density.      5/4/2020:  Right pleurex cath placed.    5/29/2020:  Needle biopsy of right lung lesion: adenocarcinoma        Resolved Hospital Problems   No resolved problems to display.       Plan    · As above I do not feel that he needs a thoracentesis at this time and see no reason why he cannot be DC home with outpt followup  · Continue treatment for his cancer  · Reassured pt and answered all of his questions  · Monitor his counts as outpt  · Should contact Dr Torres this week to touch base    Thank you for this consult.  I will follow  with you while the patient is hospitalized.  Please call ([phone]) if you have any questions.    Blake Voss MD

## 2020-08-23 NOTE — SUBJECTIVE & OBJECTIVE
Interval History:  Patient reports 3 day history of shortness of breath associated with right-sided chest pain, worse with deep breathing, intermittent cough, mostly nonproductive, POA.  States last chemotherapy about 2 weeks ago, has right chest Port-A-Cath.  Denies any fever, chills.  States PleurX catheter was removed about 1 month ago, states following removal did feel symptoms return however did consult with Pulmonary and repeat outpatient chest x-ray was obtained.  States last dose of Eliquis was a.m. 8/22.  States in the past was told had had intermittent slow heart rate.  T-max last 24 hours 99.2.  93% on room air.  Labs with pancytopenia, albumin 3.1, lactic acid 0.8, procalcitonin negative.  CTA with partially loculated right pleural effusion with extension from base to apex, no PE.  EKG sinus tachycardia with atrial rate of 133 with second-degree heart block with 2-1 conduction.  Discussed with Nursing.  Patient updated plan of care.    Review of Systems   Constitutional: Negative for chills and fever.   HENT: Positive for hearing loss (Chronic, hard of hearing).    Respiratory: Positive for cough (Mostly nonproductive) and shortness of breath.    Cardiovascular: Positive for chest pain.   Gastrointestinal: Positive for constipation (Chronic, intermittent, takes regular stool softeners at home).   Genitourinary: Negative for difficulty urinating.   Skin: Negative for wound.   Allergic/Immunologic: Positive for immunocompromised state.   Neurological: Negative for light-headedness.   Hematological: Does not bruise/bleed easily.   Psychiatric/Behavioral: Negative for confusion.     Objective:     Vital Signs (Most Recent):  Temp: 98.5 °F (36.9 °C) (08/23/20 0744)  Pulse: 87 (08/23/20 0744)  Resp: 18 (08/23/20 0744)  BP: 122/75 (08/23/20 0744)  SpO2: (!) 93 % (08/23/20 0744) Vital Signs (24h Range):  Temp:  [97.9 °F (36.6 °C)-99.2 °F (37.3 °C)] 98.5 °F (36.9 °C)  Pulse:  [49-87] 87  Resp:  [16-20] 18  SpO2:   [92 %-98 %] 93 %  BP: (108-165)/(62-83) 122/75     Weight: 109.7 kg (241 lb 12.5 oz)  Body mass index is 32.79 kg/m².    Intake/Output Summary (Last 24 hours) at 8/23/2020 0815  Last data filed at 8/23/2020 0600  Gross per 24 hour   Intake 500 ml   Output --   Net 500 ml      Physical Exam  Vitals signs and nursing note reviewed.   Constitutional:       General: He is not in acute distress.     Appearance: Normal appearance. He is not ill-appearing, toxic-appearing or diaphoretic.   HENT:      Head: Normocephalic and atraumatic.   Eyes:      General:         Right eye: No discharge.         Left eye: No discharge.   Cardiovascular:      Rate and Rhythm: Normal rate and regular rhythm.      Comments: No lower extremity edema, palpable peripheral pulses  Pulmonary:      Effort: Pulmonary effort is normal. No respiratory distress.      Breath sounds: No stridor. No wheezing or rhonchi.      Comments: Not on supplemental oxygen  Abdominal:      General: Bowel sounds are normal. There is no distension.      Palpations: Abdomen is soft.      Tenderness: There is no abdominal tenderness.   Genitourinary:     Comments: No suprapubic fullness or tenderness, no Mon catheter  Musculoskeletal:         General: No swelling.   Skin:     General: Skin is warm and dry.      Capillary Refill: Capillary refill takes less than 2 seconds.   Neurological:      General: No focal deficit present.      Mental Status: He is oriented to person, place, and time.   Psychiatric:         Mood and Affect: Mood normal.         Significant Labs:   Blood Culture:   Recent Labs   Lab 08/22/20  1705 08/22/20  1712   LABBLOO No Growth to date No Growth to date     BMP:   Recent Labs   Lab 08/23/20  0318   *      K 4.3      CO2 27   BUN 13   CREATININE 0.9   CALCIUM 9.1   MG 1.9     CBC:   Recent Labs   Lab 08/22/20  1121 08/23/20  0318   WBC 2.16* 2.35*   HGB 8.1* 8.4*   HCT 25.6* 25.9*   PLT 97* 92*     CMP:   Recent Labs   Lab  08/22/20  1121 08/23/20  0318    140   K 4.0 4.3    105   CO2 27 27    129*   BUN 16 13   CREATININE 1.0 0.9   CALCIUM 9.2 9.1   PROT 7.2 6.7   ALBUMIN 3.3* 3.1*   BILITOT 0.4 0.4   ALKPHOS 86 81   AST 17 15   ALT 13 10   ANIONGAP 10 8   EGFRNONAA >60.0 >60.0     Cardiac Markers:   Recent Labs   Lab 08/22/20  1121   *     Lactic Acid:   Recent Labs   Lab 08/22/20  1712   LACTATE 0.8     Magnesium:   Recent Labs   Lab 08/23/20  0318   MG 1.9     POCT Glucose: No results for input(s): POCTGLUCOSE in the last 48 hours.  Respiratory Culture: No results for input(s): GSRESP, RESPIRATORYC in the last 48 hours.  Troponin:   Recent Labs   Lab 08/22/20  1416 08/22/20  1938 08/23/20  0318   TROPONINI <0.030 <0.030 <0.030     TSH: No results for input(s): TSH in the last 4320 hours.  Urine Culture: No results for input(s): LABURIN in the last 48 hours.  Urine Studies: No results for input(s): COLORU, APPEARANCEUA, PHUR, SPECGRAV, PROTEINUA, GLUCUA, KETONESU, BILIRUBINUA, OCCULTUA, NITRITE, UROBILINOGEN, LEUKOCYTESUR, RBCUA, WBCUA, BACTERIA, SQUAMEPITHEL, HYALINECASTS in the last 48 hours.    Invalid input(s): WRIGHTSUR  All pertinent labs within the past 24 hours have been reviewed.    Significant Imaging: I have reviewed all pertinent imaging results/findings within the past 24 hours.     X-ray Chest Pa And Lateral    Result Date: 8/22/2020  Examination: Chest 2 views. CLINICAL HISTORY: Right-sided chest pain. Lung carcinoma. COMPARISON: 8/12/2020 at 4:10 PM. FINDINGS: The pulmonary vascularity is within normal limits the cardiac knees tonsil with evidence well-maintained. Right-sided Mediport catheter is identified distal tip located at the RA/SVC junction. There is evidence of ARMANDO linear opacities overlying the lower lung zone. Currently diaphragmatic surface. Blunted right costophrenic angle is likely obscured on a chronic basis. There is no evidence of confluent infiltrate or significant masses.  There is evidence of shifting of the descending towards the right due to postoperative changes being suspected. IMPRESSION: 1. No evidence of adverse change when compared the patient's prior imaging study of 8/12/2020. 2. Stable volume loss of the right hemithorax with minimal shift involving the central mediastinum. 3. Chronic lung changes in the basilar segments of the right lower lobe. Electronically Signed by Paul OLSON on 8/22/2020 12:49 PM    X-ray Chest Pa And Lateral    Result Date: 8/12/2020  Chest, 2 views HISTORY: Pleural effusion, previous thoracentesis. Comparison is made 7/27/2020 and 7/22/2020. The cardiomediastinal silhouette and pulmonary vasculature are within normal limits. A port type right subclavian central venous catheter remains unchanged in position. Mild predominantly linear parenchymal opacities persist in the right lower lung zone. There is unchanged pleural based opacity observed posteriorly within the right lower thorax, best demonstrated on the lateral projection. No new infiltrate or volume loss is observed. No significant effusion is demonstrated. IMPRESSION: Unchanged right lower lung zone pleural-parenchymal opacities. Electronically Signed by Roland Adam M.D. on 8/12/2020 4:26 PM    Cta Chest Non-coronary (pe Study)    Result Date: 8/22/2020  CMS MANDATED QUALITY DATA-CT RADIATION-436 HISTORY: Patient document history of chest pain. High probably pulmonary embolism.. TECHNIQUE: Thin axial imaging through the chest was performed with 100 IV contrast, with sagittal and coronal images, MIPS, and or 3D reconstructions performed. All CT exams at this facility use dose modulation, iterative reconstruction, and or weight based dosing when appropriate to reduce radiation dose to as low as reasonably achievable. Findings: The examination is of diagnostic quality as there is evidence of opacification of the entire pulmonary tree out to the tertiary order branch vessels with  enhancing sparing the aorta as well as the cardiac chambers. The main pulmonary artery maintain normal caliber without evidence of any significant widening expansion nor significant sagittal embolus. Both right and left pulmonary arteries and respective divisions maintain normal caliber and outline without evidence of any intraluminal filling defects suggestive of acute pulmonary embolism. No evidence of webbing, truncation, or pruning of the pulmonary vascularity. Right-sided IJ catheter enters from a jugular approach imaging of the lower SVC/RA junction. The aorta is abnormal caliber density evidence of prominent atherosclerotic plaque formation over the aortic arch. Centrilobular emphysema changes in the upper lobes bilaterally. A few benign reactive nodes in the precarinal and subcarinal station largest single node in the subcarinal station approaching 17 mm in size. Tracheal lumen is midline without evidence of any significant endobronchial lesions. Right and left mainstem bronchus are widely patent. No significant right ventricular strain pattern. The pericardial space appears negative for any significant effusion. Moderate pericardial thickening of post lateral edge of the pericardial space towards the right pericardial surface. Encapsulated right pleural effusion extending from base to apex with moderate rind of pleural thickening. There is moderate and atelectasis. There is evidence of linear band of pulmonary scar formation occupying the right diaphragmatic surface. Localized evidence of pulmonary scar formation the right upper lobe marginating the pleural mediastinal space. Is evidence of a small focus causing changes along the right perimediastinal border. Mild bilateral apical pleural thickening. The osseous windows are noted for moderate small is changes throughout the thoracic spine is nominal in volume. IMPRESSION: 1. Partially loculated pleural effusion on the right side extending from base to apex  reproducing minor subjacent atelectasis. 2.. Centrilobular emphysematous changes. 3. Localized zone of pulmonary consolidation marginating the right paramediastinal space. As well as the right upper lobe posteriorly that is nominal in volume. 4. Ectatic aorta with evidence of scattered atheromatous calcifications. No acute changes. 2. Electronically Signed by Paul OLSON on 8/22/2020 3:28 PM    X-ray Chest Ap Portable    Result Date: 7/27/2020  Portable chest x-ray at 11:13 AM is compared to prior study 8 7/22/2020 Clinical history is removal of right Pleurx catheter There is a right subclavian vein Port-A-Cath with tip at the caval junction. There is no pneumothorax status post right Pleurx catheter removal. There is a tiny right pleural effusion with right lower lobe airspace disease. The right upper lobe and left lung are clear. The cardiomediastinal silhouette is normal in size. There are mild degenerative changes of the right shoulder. IMPRESSION: Removal of the right Pleurx catheter without pneumothorax. There is a tiny residual right pleural effusion with right lower lobe atelectasis or infiltrate Electronically Signed by Radha Fitzgerald M.D. on 7/27/2020 11:26 AM  ECG Results          EKG 12-lead (In process)  Result time 08/22/20 11:19:13    In process by Interface, Lab In [facility] (08/22/20 11:19:13)                 Narrative:    Test Reason : R07.9,    Vent. Rate : 056 BPM     Atrial Rate : 113 BPM     P-R Int : 192 ms          QRS Dur : 084 ms      QT Int : 398 ms       P-R-T Axes : 059 040 054 degrees     QTc Int : 384 ms    Sinus tachycardia with 2nd degree A-V block with 2:1 A-V conduction  Abnormal ECG  When compared with ECG of 01-MAY-2020 10:21,  Sinus rhythm is now with 2nd degree A-V block    Referred By: [email or web address]   SELF           Confirmed By:

## 2020-08-23 NOTE — ASSESSMENT & PLAN NOTE
Patient states he suffers from chronic intermittent constipation, report last bowel movement 2 days ago.  Continue daily MiraLax with docusate.  Monitor and adjust as needed.

## 2020-08-23 NOTE — PROGRESS NOTES
08/23/20 1400   Room Air SpO2 At Rest   Room Air SpO2 At Rest 98 %   Exertional SpO2 Evaluation on Room Air   Room Air SpO2 on Exertion 95 %   Pulse 80 bpm

## 2020-08-23 NOTE — PLAN OF CARE
08/23/20 0959   Discharge Assessment   Assessment Type Discharge Planning Assessment   Confirmed/corrected address and phone number on facesheet? Yes   Assessment information obtained from? Patient   Communicated expected length of stay with patient/caregiver yes   Prior to hospitilization cognitive status: Alert/Oriented   Prior to hospitalization functional status: Independent   Current cognitive status: Alert/Oriented   Current Functional Status: Independent   Lives With alone   Able to Return to Prior Arrangements yes   Is patient able to care for self after discharge? Yes   Readmission Within the Last 30 Days no previous admission in last 30 days   Patient currently being followed by outpatient case management? No   Patient currently receives any other outside agency services? No   Equipment Currently Used at Home none   Do you have any problems affording any of your prescribed medications? No   Is the patient taking medications as prescribed? yes   Does the patient have transportation home? Yes   Transportation Anticipated family or friend will provide   Dialysis Name and Scheduled days N/A   Does the patient receive services at the [facility]? No   Discharge Plan A Home   Discharge Plan B Home   DME Needed Upon Discharge  none   Patient/Family in Agreement with Plan yes

## 2020-08-23 NOTE — ASSESSMENT & PLAN NOTE
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

## 2020-08-23 NOTE — ASSESSMENT & PLAN NOTE
Patient was noted to be bradycardic on admission, EKG with 2-1 heart block.  He states today did have history of intermittent low heart rates in the past.  Previous EKG reviewed with sinus rhythm.  Serial troponins have remained negative.  Medications reviewed, no offending drugs noted.  Continue remote telemetry monitoring  Repeat 12 lead EKG today  Further plan as per clinical course

## 2020-08-23 NOTE — PLAN OF CARE
08/23/20 1043   Final Note   Assessment Type Final Discharge Note   Anticipated Discharge Disposition Home

## 2020-08-23 NOTE — RESPIRATORY THERAPY
08/22/20 6831   Patient Assessment/Suction   Respiratory Effort Unlabored   All Lung Fields Breath Sounds clear   Rhythm/Pattern, Respiratory pattern regular   PRE-TX-O2   O2 Device (Oxygen Therapy) room air   SpO2 (!) 93 %   Pulse Oximetry Type Intermittent   $ Pulse Oximetry - Single Charge Pulse Oximetry - Single   Pulse (!) 53   Resp 16   Aerosol Therapy   $ Aerosol Therapy Charges PRN treatment not required   Respiratory Evaluation   $ Care Plan Tech Time 15 min   Evaluation For New Orders   Admitting Diagnosis sob

## 2020-08-23 NOTE — ASSESSMENT & PLAN NOTE
Known history of malignant right pleural effusion status post PleurX catheter placement and recent removal by Dr. Torres.  Now presenting with worsening shortness of breath with right-sided pleuritic chest pain.  CTA without any evidence of PE however does report partially loculated right pleural effusion extending from the base to the apex.  Procalcitonin negative, seen by Pulmonary who fell symptomatology related to lung cancer rather than pleural effusion, recommended against thoracocentesis.  Discharged to follow-up with outpatient pulmonologist and to continue chemotherapy.

## 2020-08-23 NOTE — PLAN OF CARE
08/23/20 1003   BERG Message   Medicare Outpatient and Observation Notification regarding financial responsibility Given to patient/caregiver;Explained to patient/caregiver;Signed/date by patient/caregiver   Date BERG was signed 08/23/20   Time BERG was signed 1002

## 2020-08-23 NOTE — PROGRESS NOTES
[facility] Medicine  Progress Note    Patient Name: Anson Do  MRN: [id number]  Patient Class: OP- Observation   Admission Date: 8/22/2020  Length of Stay: 0 days  Attending Physician: Jessica Barrow MD  Primary Care Provider: Vincent Torres MD        Subjective:     Principal Problem:Pleural effusion        HPI:  HPI as per NP Hoahaoism:  Anson Do is a 75 y.o. White male who  has a past medical history of Adenocarcinoma of lung (04/2020), Arm vein blood clot, right, Back pain, GERD (gastroesophageal reflux disease), GERD (gastroesophageal reflux disease), Gout, [place] (hard of hearing), Pleural effusion on right (04/2020), and SOB (shortness of breath).. The patient presented to [facility] on 8/22/2020 with a primary complaint of RT SIDE RIB PAIN (PAIN WITH RESPIRATION) and Shortness of Breath (2-3 DAYS)     History was obtained from the patient ER physician Sign-out. Patient presents to the ED with the complaint of right lateral chest pain. The pain radiates up to his right shoulder. Pain is worse with deep breathing and movement. No alleviating factors. He reports he has lung cancer and is currently under chemotherapy treatment with Dr. Donovan. He had a right pleurex cath placed back in May secondary to malignant pleural effusions, and he had it removed about a month ago after it stopped draining. He reports that over the past few 3-4 days he has been having progressively worsening pain and some SOB. He denies fever, chills, productive cough, nausea, vomiting, abdominal pain, dizziness, lightheadedness, or LOC.         In the emergency room, patient VSS. Afebrile. CBC shows pancytopenia. CMP was unremarkable. BNP and troponin within reference ranges. CTA of the chest shows partially loculated pleural effusion on the right side extending from base to apex. Also shows areas of consolidation in right lung. The patient is treated with IV antibiotics.      Decision to admit  was taken and patient was informed about the plan of care.    Overview/Hospital Course:  I, Dr Barrow, assumed care of this patient on 8/23/20. Patient with known history of right upper lung adenocarcinoma, followed by Dr. Donovan, currently undergoing chemotherapy with last dose received about 2 weeks prior, known recurrent malignant right pleural effusion with previous right PleurX catheter, placed and removed (about 1 month ago) by Dr. Ambrosio ceja, presenting with worsening shortness of breath associated with right-sided chest discomfort worse with deep inspiration.  Patient states since removal of the PleurX catheter he felt fluid was reaccumulating, he was seen by pulmonologist and repeat chest x-ray was obtained.  Noted to be bradycardic on arrival vitals however he states he had history of same, EKG with 2-1 heart block, troponin by 3-, pancytopenia, albumin 3.1, lactic acid 0.8, procalcitonin negative.  CTA without any evidence of PE however did report partially loculated right pleural effusion extending from the base to the apex with centrilobular emphysema.  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 8/22.  He was admitted and placed on IV Levaquin with IR consulted for thoracocentesis, pulmonary has also been consulted.    Interval History:  Patient reports 3 day history of shortness of breath associated with right-sided chest pain, worse with deep breathing, intermittent cough, mostly nonproductive, POA.  States last chemotherapy about 2 weeks ago, has right chest Port-A-Cath.  Denies any fever, chills.  States PleurX catheter was removed about 1 month ago, states following removal did feel symptoms return however did consult with Pulmonary and repeat outpatient chest x-ray was obtained.  States last dose of Eliquis was a.m. 8/22.  States in the past was told had had intermittent slow heart rate.  T-max last 24 hours 99.2.  93% on room air.  Labs with pancytopenia,  albumin 3.1, lactic acid 0.8, procalcitonin negative.  CTA with partially loculated right pleural effusion with extension from base to apex, no PE.  EKG sinus tachycardia with atrial rate of 133 with second-degree heart block with 2-1 conduction.  Discussed with Nursing.  Patient updated plan of care.    Review of Systems   Constitutional: Negative for chills and fever.   HENT: Positive for hearing loss (Chronic, hard of hearing).    Respiratory: Positive for cough (Mostly nonproductive) and shortness of breath.    Cardiovascular: Positive for chest pain.   Gastrointestinal: Positive for constipation (Chronic, intermittent, takes regular stool softeners at home).   Genitourinary: Negative for difficulty urinating.   Skin: Negative for wound.   Allergic/Immunologic: Positive for immunocompromised state.   Neurological: Negative for light-headedness.   Hematological: Does not bruise/bleed easily.   Psychiatric/Behavioral: Negative for confusion.     Objective:     Vital Signs (Most Recent):  Temp: 98.5 °F (36.9 °C) (08/23/20 0744)  Pulse: 87 (08/23/20 0744)  Resp: 18 (08/23/20 0744)  BP: 122/75 (08/23/20 0744)  SpO2: (!) 93 % (08/23/20 0744) Vital Signs (24h Range):  Temp:  [97.9 °F (36.6 °C)-99.2 °F (37.3 °C)] 98.5 °F (36.9 °C)  Pulse:  [49-87] 87  Resp:  [16-20] 18  SpO2:  [92 %-98 %] 93 %  BP: (108-165)/(62-83) 122/75     Weight: 109.7 kg (241 lb 12.5 oz)  Body mass index is 32.79 kg/m².    Intake/Output Summary (Last 24 hours) at 8/23/2020 0815  Last data filed at 8/23/2020 0600  Gross per 24 hour   Intake 500 ml   Output --   Net 500 ml      Physical Exam  Vitals signs and nursing note reviewed.   Constitutional:       General: He is not in acute distress.     Appearance: Normal appearance. He is not ill-appearing, toxic-appearing or diaphoretic.   HENT:      Head: Normocephalic and atraumatic.   Eyes:      General:         Right eye: No discharge.         Left eye: No discharge.   Cardiovascular:      Rate and  Rhythm: Normal rate and regular rhythm.      Comments: No lower extremity edema, palpable peripheral pulses  Pulmonary:      Effort: Pulmonary effort is normal. No respiratory distress.      Breath sounds: No stridor. No wheezing or rhonchi.      Comments: Not on supplemental oxygen  Abdominal:      General: Bowel sounds are normal. There is no distension.      Palpations: Abdomen is soft.      Tenderness: There is no abdominal tenderness.   Genitourinary:     Comments: No suprapubic fullness or tenderness, no Mon catheter  Musculoskeletal:         General: No swelling.   Skin:     General: Skin is warm and dry.      Capillary Refill: Capillary refill takes less than 2 seconds.   Neurological:      General: No focal deficit present.      Mental Status: He is oriented to person, place, and time.   Psychiatric:         Mood and Affect: Mood normal.         Significant Labs:   Blood Culture:   Recent Labs   Lab 08/22/20  1705 08/22/20  1712   LABBLOO No Growth to date No Growth to date     BMP:   Recent Labs   Lab 08/23/20  0318   *      K 4.3      CO2 27   BUN 13   CREATININE 0.9   CALCIUM 9.1   MG 1.9     CBC:   Recent Labs   Lab 08/22/20  1121 08/23/20  0318   WBC 2.16* 2.35*   HGB 8.1* 8.4*   HCT 25.6* 25.9*   PLT 97* 92*     CMP:   Recent Labs   Lab 08/22/20  1121 08/23/20  0318    140   K 4.0 4.3    105   CO2 27 27    129*   BUN 16 13   CREATININE 1.0 0.9   CALCIUM 9.2 9.1   PROT 7.2 6.7   ALBUMIN 3.3* 3.1*   BILITOT 0.4 0.4   ALKPHOS 86 81   AST 17 15   ALT 13 10   ANIONGAP 10 8   EGFRNONAA >60.0 >60.0     Cardiac Markers:   Recent Labs   Lab 08/22/20  1121   *     Lactic Acid:   Recent Labs   Lab 08/22/20  1712   LACTATE 0.8     Magnesium:   Recent Labs   Lab 08/23/20  0318   MG 1.9     POCT Glucose: No results for input(s): POCTGLUCOSE in the last 48 hours.  Respiratory Culture: No results for input(s): GSRESP, RESPIRATORYC in the last 48 hours.  Troponin:    Recent Labs   Lab 08/22/20  1416 08/22/20  1938 08/23/20  0318   TROPONINI <0.030 <0.030 <0.030     TSH: No results for input(s): TSH in the last 4320 hours.  Urine Culture: No results for input(s): LABURIN in the last 48 hours.  Urine Studies: No results for input(s): COLORU, APPEARANCEUA, PHUR, SPECGRAV, PROTEINUA, GLUCUA, KETONESU, BILIRUBINUA, OCCULTUA, NITRITE, UROBILINOGEN, LEUKOCYTESUR, RBCUA, WBCUA, BACTERIA, SQUAMEPITHEL, HYALINECASTS in the last 48 hours.    Invalid input(s): WRIGHTSUR  All pertinent labs within the past 24 hours have been reviewed.    Significant Imaging: I have reviewed all pertinent imaging results/findings within the past 24 hours.     X-ray Chest Pa And Lateral    Result Date: 8/22/2020  Examination: Chest 2 views. CLINICAL HISTORY: Right-sided chest pain. Lung carcinoma. COMPARISON: 8/12/2020 at 4:10 PM. FINDINGS: The pulmonary vascularity is within normal limits the cardiac knees tonsil with evidence well-maintained. Right-sided Mediport catheter is identified distal tip located at the RA/SVC junction. There is evidence of ARMANDO linear opacities overlying the lower lung zone. Currently diaphragmatic surface. Blunted right costophrenic angle is likely obscured on a chronic basis. There is no evidence of confluent infiltrate or significant masses. There is evidence of shifting of the descending towards the right due to postoperative changes being suspected. IMPRESSION: 1. No evidence of adverse change when compared the patient's prior imaging study of 8/12/2020. 2. Stable volume loss of the right hemithorax with minimal shift involving the central mediastinum. 3. Chronic lung changes in the basilar segments of the right lower lobe. Electronically Signed by Paul OLSON on 8/22/2020 12:49 PM    X-ray Chest Pa And Lateral    Result Date: 8/12/2020  Chest, 2 views HISTORY: Pleural effusion, previous thoracentesis. Comparison is made 7/27/2020 and 7/22/2020. The cardiomediastinal  silhouette and pulmonary vasculature are within normal limits. A port type right subclavian central venous catheter remains unchanged in position. Mild predominantly linear parenchymal opacities persist in the right lower lung zone. There is unchanged pleural based opacity observed posteriorly within the right lower thorax, best demonstrated on the lateral projection. No new infiltrate or volume loss is observed. No significant effusion is demonstrated. IMPRESSION: Unchanged right lower lung zone pleural-parenchymal opacities. Electronically Signed by Roland Adam M.D. on 8/12/2020 4:26 PM    Cta Chest Non-coronary (pe Study)    Result Date: 8/22/2020  CMS MANDATED QUALITY DATA-CT RADIATION-436 HISTORY: Patient document history of chest pain. High probably pulmonary embolism.. TECHNIQUE: Thin axial imaging through the chest was performed with 100 IV contrast, with sagittal and coronal images, MIPS, and or 3D reconstructions performed. All CT exams at this facility use dose modulation, iterative reconstruction, and or weight based dosing when appropriate to reduce radiation dose to as low as reasonably achievable. Findings: The examination is of diagnostic quality as there is evidence of opacification of the entire pulmonary tree out to the tertiary order branch vessels with enhancing sparing the aorta as well as the cardiac chambers. The main pulmonary artery maintain normal caliber without evidence of any significant widening expansion nor significant sagittal embolus. Both right and left pulmonary arteries and respective divisions maintain normal caliber and outline without evidence of any intraluminal filling defects suggestive of acute pulmonary embolism. No evidence of webbing, truncation, or pruning of the pulmonary vascularity. Right-sided IJ catheter enters from a jugular approach imaging of the lower SVC/RA junction. The aorta is abnormal caliber density evidence of prominent atherosclerotic plaque formation  over the aortic arch. Centrilobular emphysema changes in the upper lobes bilaterally. A few benign reactive nodes in the precarinal and subcarinal station largest single node in the subcarinal station approaching 17 mm in size. Tracheal lumen is midline without evidence of any significant endobronchial lesions. Right and left mainstem bronchus are widely patent. No significant right ventricular strain pattern. The pericardial space appears negative for any significant effusion. Moderate pericardial thickening of post lateral edge of the pericardial space towards the right pericardial surface. Encapsulated right pleural effusion extending from base to apex with moderate rind of pleural thickening. There is moderate and atelectasis. There is evidence of linear band of pulmonary scar formation occupying the right diaphragmatic surface. Localized evidence of pulmonary scar formation the right upper lobe marginating the pleural mediastinal space. Is evidence of a small focus causing changes along the right perimediastinal border. Mild bilateral apical pleural thickening. The osseous windows are noted for moderate small is changes throughout the thoracic spine is nominal in volume. IMPRESSION: 1. Partially loculated pleural effusion on the right side extending from base to apex reproducing minor subjacent atelectasis. 2.. Centrilobular emphysematous changes. 3. Localized zone of pulmonary consolidation marginating the right paramediastinal space. As well as the right upper lobe posteriorly that is nominal in volume. 4. Ectatic aorta with evidence of scattered atheromatous calcifications. No acute changes. 2. Electronically Signed by Paul OLSON on 8/22/2020 3:28 PM    X-ray Chest Ap Portable    Result Date: 7/27/2020  Portable chest x-ray at 11:13 AM is compared to prior study 8 7/22/2020 Clinical history is removal of right Pleurx catheter There is a right subclavian vein Port-A-Cath with tip at the caval  junction. There is no pneumothorax status post right Pleurx catheter removal. There is a tiny right pleural effusion with right lower lobe airspace disease. The right upper lobe and left lung are clear. The cardiomediastinal silhouette is normal in size. There are mild degenerative changes of the right shoulder. IMPRESSION: Removal of the right Pleurx catheter without pneumothorax. There is a tiny residual right pleural effusion with right lower lobe atelectasis or infiltrate Electronically Signed by Radha Fitzgerald M.D. on 7/27/2020 11:26 AM  ECG Results          EKG 12-lead (In process)  Result time 08/22/20 11:19:13    In process by Interface, Lab In [facility] (08/22/20 11:19:13)                 Narrative:    Test Reason : R07.9,    Vent. Rate : 056 BPM     Atrial Rate : 113 BPM     P-R Int : 192 ms          QRS Dur : 084 ms      QT Int : 398 ms       P-R-T Axes : 059 040 054 degrees     QTc Int : 384 ms    Sinus tachycardia with 2nd degree A-V block with 2:1 A-V conduction  Abnormal ECG  When compared with ECG of 01-MAY-2020 10:21,  Sinus rhythm is now with 2nd degree A-V block    Referred By: [id number]   SELF           Confirmed By:                                   Assessment/Plan:      * Recurrent right pleural effusion, malignant  Known history of malignant right pleural effusion status post PleurX catheter placement and recent removal by Dr. Torres.  Now presenting with worsening shortness of breath with right-sided pleuritic chest pain.  CTA without any evidence of PE however does report partially loculated right pleural effusion extending from the base to the apex.  Procalcitonin negative, low suspicion infection.  Discontinue empiric antibiotics and monitor  Admission team did consult IR for thoracocentesis needed, Eliquis on hold  Breathing treatments as needed  A.m. labs ordered for review  Pulmonary consulted      Bradycardia with 2-1 conduction  Patient was noted to be bradycardic on admission, EKG with  2-1 heart block.  He states today did have history of intermittent low heart rates in the past.  Previous EKG reviewed with sinus rhythm.  Serial troponins have remained negative.  Medications reviewed, no offending drugs noted.  Continue remote telemetry monitoring  Repeat 12 lead EKG today  Further plan as per clinical course      Malignant neoplasm of upper lobe of right lung  Known history of right upper lung adenocarcinoma of the lung.  He is followed by Dr. Donovan, currently on chemotherapy with last dose received about 2 weeks ago.      Drug-induced pancytopenia  Likely due to cancer and associated chemotherapy.  Monitoring      Constipation  Patient states he suffers from chronic intermittent constipation, report last bowel movement 2 days ago.  Continue daily MiraLax with docusate.  Monitor and adjust as needed.      GERD (gastroesophageal reflux disease)  On PPI therapy      Subclavian vein thrombosis, right  Known history right subclavian vein DVT, on Eliquis at home however on hold due to potential procedure.        VTE Risk Mitigation (From admission, onward)         Ordered     Place MICHELE hose  Until discontinued      08/22/20 1854     IP VTE HIGH RISK PATIENT  Once      08/22/20 1854                Discharge Planning   ARMANDO:      Code Status: Full Code   Is the patient medically ready for discharge?:     Reason for patient still in hospital (select all that apply): Treatment                     Jessica Barrow MD  Department of Hospital Medicine   [facility]

## 2020-08-23 NOTE — H&P
[facility] Medicine History & Physical Examination   Patient Name: Anson Do  MRN: [id number]  Patient Class: OP- Observation   Admission Date: 8/22/2020  1:23 PM  Length of Stay: 0  Attending Physician:Diaz Gonzalez MD  Primary Care Provider: Vincent Torres MD  Face-to-Face encounter date: 08/22/2020  Code Status: full code  Chief Complaint: RT SIDE RIB PAIN (PAIN WITH RESPIRATION) and Shortness of Breath (2-3 DAYS)        Patient information was obtained from patient, past medical records and ER records.   HISTORY OF PRESENT ILLNESS:   Anson Do is a 75 y.o. White male who  has a past medical history of Adenocarcinoma of lung (04/2020), Arm vein blood clot, right, Back pain, GERD (gastroesophageal reflux disease), GERD (gastroesophageal reflux disease), Gout, [place] (hard of hearing), Pleural effusion on right (04/2020), and SOB (shortness of breath).. The patient presented to [facility] on 8/22/2020 with a primary complaint of RT SIDE RIB PAIN (PAIN WITH RESPIRATION) and Shortness of Breath (2-3 DAYS)  .       History was obtained from the patient ER physician Sign-out. Patient presents to the ED with the complaint of right lateral chest pain. The pain radiates up to his right shoulder. Pain is worse with deep breathing and movement. No alleviating factors. He reports he has lung cancer and is currently under chemotherapy treatment with Dr. Donovan. He had a right pleurex cath placed back in May secondary to malignant pleural effusions, and he had it removed about a month ago after it stopped draining. He reports that over the past few 3-4 days he has been having progressively worsening pain and some SOB. He denies fever, chills, productive cough, nausea, vomiting, abdominal pain, dizziness, lightheadedness, or LOC.        In the emergency room, patient VSS. Afebrile. CBC shows pancytopenia. CMP was unremarkable. BNP and troponin within reference ranges. CTA of the chest  shows partially loculated pleural effusion on the right side extending from base to apex. Also shows areas of consolidation in right lung. The patient is treated with IV antibiotics.     Decision to admit was taken and patient was informed about the plan of care.   REVIEW OF SYSTEMS:   10 Point Review of System was performed and was found to be negative except for that mentioned already in the HPI above.     PAST MEDICAL HISTORY:     Past Medical History:   Diagnosis Date    Adenocarcinoma of lung 04/2020    Arm vein blood clot, right     Back pain     GERD (gastroesophageal reflux disease)     GERD (gastroesophageal reflux disease)     Gout     [place] (hard of hearing)     Pleural effusion on right 04/2020    SOB (shortness of breath)        PAST SURGICAL HISTORY:     Past Surgical History:   Procedure Laterality Date    BACK SURGERY  1975    ESOPHAGOGASTRODUODENOSCOPY N/A 6/18/2020    Procedure: EGD (ESOPHAGOGASTRODUODENOSCOPY);  Surgeon: Nisha Curry MD;  Location: [facility];  Service: Endoscopy;  Laterality: N/A;    hernina repair      INSERTION OF PLEURAL CATHETER Right 05/2020    pleur X Cath    INSERTION OF TUNNELED CENTRAL VENOUS CATHETER (CVC) WITH SUBCUTANEOUS PORT N/A 5/20/2020    Procedure: [id number];  Surgeon: Yoli Rivas MD;  Location: [facility];  Service: General;  Laterality: N/A;    LUMBAR SPINE SURGERY      L3-l4    THORACENTESIS Right 04/2020    THORACENTESIS Right 7/27/2020    Procedure: PLEUREX REMOVAL;  Surgeon: Vincent Torres MD;  Location: [facility];  Service: Pulmonary;  Laterality: Right;       ALLERGIES:   Patient has no known allergies.    FAMILY HISTORY:     Family History   Problem Relation Age of Onset    Allergic rhinitis Neg Hx     Allergies Neg Hx     Angioedema Neg Hx     Asthma Neg Hx     Eczema Neg Hx     Immunodeficiency Neg Hx        SOCIAL HISTORY:     Social History     Tobacco Use    Smoking status: Former Smoker     Packs/day: 2.00      Start date:      Quit date: 1988     Years since quittin.0   Substance Use Topics    Alcohol use: Never     Frequency: Never        Social History     Substance and Sexual Activity   Sexual Activity Not on file        HOME MEDICATIONS:     Prior to Admission medications    Medication Sig Start Date End Date Taking? Authorizing Provider   apixaban (ELIQUIS) 5 mg Tab Take 1 tablet (5 mg total) by mouth 2 (two) times daily. 8/3/20  Yes FREDERIC Zacarias   colchicine (COLCRYS) 0.6 mg tablet Take 1 tablet (0.6 mg total) by mouth once daily. 20 Yes FREDERIC Zacarias   dexAMETHasone (DECADRON) 4 MG Tab Take 4 mg by mouth every 12 (twelve) hours.   Yes Historical Provider, MD   docusate sodium (COLACE) 100 MG capsule Take 200 mg by mouth every evening.   Yes Historical Provider, MD   fexofenadine (ALLEGRA) 180 MG tablet Take 180 mg by mouth once daily.   Yes Historical Provider, MD   folic acid (FOLVITE) 1 MG tablet Take 1 tablet (1 mg total) by mouth once daily. 20 Yes FREDERIC Zacarias   magnesium 250 mg Tab Take 1 tablet by mouth every evening.   Yes Historical Provider, MD   ondansetron (ZOFRAN) 8 MG tablet Take 1 tablet (8 mg total) by mouth every 8 (eight) hours as needed for Nausea. 20 Yes FREDERIC Zacarias   pantoprazole (PROTONIX) 40 MG tablet Take 1 tablet (40 mg total) by mouth once daily. 20 Yes Kenney Pacheco MD   polyethylene glycol (GLYCOLAX) 17 gram/dose powder Take 17 g by mouth once daily.   Yes Historical Provider, MD   promethazine (PHENERGAN) 25 MG tablet Take 25 mg by mouth every 6 (six) hours as needed for Nausea.   Yes Historical Provider, MD   albuterol (PROVENTIL/VENTOLIN HFA) 90 mcg/actuation inhaler Inhale 2 puffs into the lungs every 4 to 6 hours as needed for Shortness of Breath. Rescue 20   Vincent Torres MD         PHYSICAL EXAM:   BP (!) 149/75   Pulse (!) 49   Temp 99.2 °F (37.3 °C) (Oral)   Resp 20   Ht  6' (1.829 m)   Wt 109.8 kg (242 lb)   SpO2 95%   BMI 32.82 kg/m²   Vitals Reviewed  General appearance: Well-developed, well-nourished,  White male.  Skin: No Rash. Warm, dry.   Neuro: AAOx3. Motor and sensory exams grossly intact. Good tone. Power in all 4 extremities 5/5.   HENT: Atraumatic head. Moist mucous membranes of oral cavity.  Eyes: Normal extraocular movements. PERRLA  Neck: Supple. No evidence of lymphadenopathy. No thyroidomegaly.  Lungs: Decreased lung sounds to RLL, otherwise clear throughout.  No wheezing present.   Heart: Regular rate and rhythm. S1 and S2 present with no murmurs/gallop/rub. No pedal edema. No JVD present.   Abdomen: Soft, non-distended, non-tender. No rebound tenderness/guarding. No masses or organomegaly. Bowel sounds are normal. Bladder is not palpable.   Extremities: No cyanosis, clubbing. Capillary refill less than 2 seconds.   Psych/mental status: Mood and affect appropriate. Cooperative. Responds appropriately to questions.   EMERGENCY DEPARTMENT LABS AND IMAGING:     Labs Reviewed   CBC W/ AUTO DIFFERENTIAL - Abnormal; Notable for the following components:       Result Value    WBC 2.16 (*)     RBC 2.90 (*)     Hemoglobin 8.1 (*)     Hematocrit 25.6 (*)     Mean Corpuscular Hemoglobin Conc 31.6 (*)     RDW 19.5 (*)     Platelets 97 (*)     Gran # (ANC) 1.0 (*)     Lymph # 0.7 (*)     Mono% 18.5 (*)     All other components within normal limits   COMPREHENSIVE METABOLIC PANEL - Abnormal; Notable for the following components:    Albumin 3.3 (*)     All other components within normal limits   B-TYPE NATRIURETIC PEPTIDE - Abnormal; Notable for the following components:     (*)     All other components within normal limits   CULTURE, BLOOD   CULTURE, BLOOD   TROPONIN I   TROPONIN I   LACTIC ACID, PLASMA   SARS-COV-2 RNA AMPLIFICATION, QUAL   PROCALCITONIN   PROTIME-INR   APTT       CTA Chest Non-Coronary (PE Study)   Final Result      X-Ray Chest PA And Lateral   Final  Result          ASSESSMENT & PLAN:   Pleural effusion:  Likely malignant; patient with mild sob, no resp distress.  Admit to Med-tele  IV Levaquin started in the ED  Cultures pending  Check procalcitonin- will d/c antibiotics if negative  Consult Pulmonary  IR consult for thoracentesis  Will hold Eliquis for tonight with plans for procedure in the AM.    Malignant neoplasm of upper lobe of right lung:  Followed by Dr. Donovan for chemotherapy      Pancytopenia:  Likely secondary to chemotherapy; next session due in 1 week.  No signs of active bleeding; afebrile  AM labs      DVT Prophylaxis: Mechanical DVT prophylaxis and will be advised to be as mobile as possible and sit in a chair as tolerated.   ________________________________________________________________________________    Discharge Planning and Disposition: No mobility needs. Ambulating well. Patient will be discharged in 1-2 days  Face-to-Face encounter date: 08/22/2020  Encounter included review of the medical records, interviewing and examining the patient face-to-face, discussion with other health care providers including emergency medicine physician, admission orders, interpreting lab/test results and formulating a plan of care.   Medical Decision Making during this encounter was  [_] Low Complexity  [_] Moderate Complexity  [x] High Complexity  _________________________________________________________________________________    INPATIENT LIST OF MEDICATIONS     Current Facility-Administered Medications:     acetaminophen tablet 650 mg, 650 mg, Oral, Q4H PRN, Patience Garcia NP    albuterol inhaler 2 puff, 2 puff, Inhalation, Q4H PRN, Patience Garcia NP    [START ON 8/23/2020] cetirizine tablet 10 mg, 10 mg, Oral, Daily, Patience Garcia NP    [START ON 8/23/2020] colchicine capsule/tablet 0.6 mg, 0.6 mg, Oral, Daily, Patience Garcia NP    cyanocobalamin injection 1,000 mcg, 1,000 mcg, Subcutaneous, Q30 Days, Blake Donovan MD,  1,000 mcg at 08/10/20 1207    dexAMETHasone tablet 4 mg, 4 mg, Oral, Q12H, Patience Garcia NP    docusate sodium capsule 200 mg, 200 mg, Oral, QHS, Patience Garcia NP    [START ON 8/23/2020] folic acid tablet 1 mg, 1 mg, Oral, Daily, Patience Garcia NP    levoFLOXacin 750 mg/150 mL IVPB 750 mg, 750 mg, Intravenous, ED 1 Time, Iggy Ellison MD, Last Rate: 100 mL/hr at 08/22/20 1804, 750 mg at 08/22/20 1804    [START ON 8/23/2020] levoFLOXacin 750 mg/150 mL IVPB 750 mg, 750 mg, Intravenous, Q24H, Patience Garcia NP    ondansetron injection 4 mg, 4 mg, Intravenous, Q6H PRN, Patience Garcia NP    ondansetron tablet 8 mg, 8 mg, Oral, Q8H PRN, Patience Garcia NP    [START ON 8/23/2020] pantoprazole EC tablet 40 mg, 40 mg, Oral, Daily, Patience Garcia NP    [START ON 8/23/2020] polyethylene glycol packet 17 g, 17 g, Oral, Daily, Patience Garcia NP    promethazine tablet 25 mg, 25 mg, Oral, Q6H PRN, Patience Garcia NP    sodium chloride 0.9% flush 10 mL, 10 mL, Intravenous, PRN, Patience Garcia NP    Current Outpatient Medications:     apixaban (ELIQUIS) 5 mg Tab, Take 1 tablet (5 mg total) by mouth 2 (two) times daily., Disp: 30 tablet, Rfl: 2    colchicine (COLCRYS) 0.6 mg tablet, Take 1 tablet (0.6 mg total) by mouth once daily., Disp: 30 tablet, Rfl: 2    dexAMETHasone (DECADRON) 4 MG Tab, Take 4 mg by mouth every 12 (twelve) hours., Disp: , Rfl:     docusate sodium (COLACE) 100 MG capsule, Take 200 mg by mouth every evening., Disp: , Rfl:     fexofenadine (ALLEGRA) 180 MG tablet, Take 180 mg by mouth once daily., Disp: , Rfl:     folic acid (FOLVITE) 1 MG tablet, Take 1 tablet (1 mg total) by mouth once daily., Disp: 100 tablet, Rfl: 3    magnesium 250 mg Tab, Take 1 tablet by mouth every evening., Disp: , Rfl:     ondansetron (ZOFRAN) 8 MG tablet, Take 1 tablet (8 mg total) by mouth every 8 (eight) hours as needed for Nausea., Disp: 30 tablet, Rfl: 5    pantoprazole  (PROTONIX) 40 MG tablet, Take 1 tablet (40 mg total) by mouth once daily., Disp: 30 tablet, Rfl: 2    polyethylene glycol (GLYCOLAX) 17 gram/dose powder, Take 17 g by mouth once daily., Disp: , Rfl:     promethazine (PHENERGAN) 25 MG tablet, Take 25 mg by mouth every 6 (six) hours as needed for Nausea., Disp: , Rfl:     albuterol (PROVENTIL/VENTOLIN HFA) 90 mcg/actuation inhaler, Inhale 2 puffs into the lungs every 4 to 6 hours as needed for Shortness of Breath. Rescue, Disp: 18 g, Rfl: 3      Scheduled Meds:   [START ON 8/23/2020] cetirizine  10 mg Oral Daily    [START ON 8/23/2020] colchicine  0.6 mg Oral Daily    cyanocobalamin  1,000 mcg Subcutaneous Q30 Days    dexAMETHasone  4 mg Oral Q12H    docusate sodium  200 mg Oral QHS    [START ON 8/23/2020] folic acid  1 mg Oral Daily    levoFLOXacin  750 mg Intravenous ED 1 Time    [START ON 8/23/2020] levoFLOXacin  750 mg Intravenous Q24H    [START ON 8/23/2020] pantoprazole  40 mg Oral Daily    [START ON 8/23/2020] polyethylene glycol  17 g Oral Daily     Continuous Infusions:  PRN Meds:.acetaminophen, albuterol, ondansetron, ondansetron, promethazine, sodium chloride 0.9%      Patience Garcia  [facility] Hospitalist  08/22/2020

## 2020-08-23 NOTE — PLAN OF CARE
08/23/20 1001   Medicare Message   Important Message from Medicare regarding Discharge Appeal Rights Given to patient/caregiver;Explained to patient/caregiver;Signed/date by patient/caregiver   Date IMM was signed 08/23/20   Time IMM was signed 1002

## 2020-08-26 NOTE — TELEPHONE ENCOUNTER
----- Message from Elizabeth Langford sent at 8/26/2020  4:03 PM CDT -----  Regarding: MOVE CHEMO  PLEASE MOVE CHEMO BACK 1 WEEK

## 2020-09-03 NOTE — ASSESSMENT & PLAN NOTE
I had a long discussion with Mr. Do about the progression seen on the PET scan.  He is s/p four cycles of chemo/immuno and it seems this tumor may be refractory to chemotherapy.  This may still, however be responsive to immunotherapy and I discussed keeping this for 2 more cycles then getting a ct chest to check for stabilization.      In the meantime, I will investigate another are of biopsy for EGFR, ALK and Ros 1 of which could not be done due to insufficient tumor on the original biopsy. Patient is good with this approach.

## 2020-09-03 NOTE — PROGRESS NOTES
PROGRESS NOTE    Subjective:       Patient ID: Anson Do is a 75 y.o. male.    4/20/2020:  Patient presents to [facility] with cough and sob and right pleural effusion.      4/21/2020:  Thoracentesis 2L fluid. Path c/w Adenocarcinoma. CTA shows RUL nodular density.      5/4/2020:  Right pleurex cath placed.    5/29/2020:  Needle biopsy of right lung lesion: adenocarcinoma  [facility] Testing  PDL1  10%  BRAF   Neg  Trk  Neg  MMR  Prof  EGFR  NST  Ros 1  NST  ALK  NST    Chief Complaint:  No chief complaint on file.  Lung cancer follow up.     History of Present Illness:   Anson Do is a 75 y.o. male who presents for follow up of lung cancer.   Patient is doing well.      He states he is tolerating the chemotherapy well and has no new complaints today.     Carbo/pem/pem  Cycle 1: 6/8/2020  Cycle 2: 6/29/2020  Cycle 3: 7/20/2020  Cycle 4: 8/10/2020    Family and Social history reviewed and is unchanged from 5/12/2020      ROS:  Review of Systems   Constitutional: Positive for unexpected weight change. Negative for appetite change and fever.   HENT: Negative for nosebleeds.    Eyes: Negative for visual disturbance.   Respiratory: Negative for cough, chest tightness and shortness of breath.    Cardiovascular: Negative for chest pain.   Gastrointestinal: Negative for abdominal pain, blood in stool and diarrhea.   Genitourinary: Negative for frequency and hematuria.   Musculoskeletal: Negative for back pain.   Skin: Negative for rash.   Neurological: Negative for headaches.   Hematological: Negative for adenopathy. Does not bruise/bleed easily.   Psychiatric/Behavioral: The patient is not nervous/anxious.           Current Outpatient Medications:     albuterol (PROVENTIL/VENTOLIN HFA) 90 mcg/actuation inhaler, Inhale 2 puffs into the lungs every 4 to 6 hours as needed for Shortness of Breath. Rescue, Disp: 18 g, Rfl: 3    apixaban (ELIQUIS) 5 mg Tab, Take 1 tablet (5 mg  total) by mouth 2 (two) times daily., Disp: 30 tablet, Rfl: 2    colchicine (COLCRYS) 0.6 mg tablet, Take 1 tablet (0.6 mg total) by mouth once daily., Disp: 30 tablet, Rfl: 2    dexAMETHasone (DECADRON) 4 MG Tab, Take 4 mg by mouth every 12 (twelve) hours., Disp: , Rfl:     docusate sodium (COLACE) 100 MG capsule, Take 200 mg by mouth every evening., Disp: , Rfl:     fexofenadine (ALLEGRA) 180 MG tablet, Take 180 mg by mouth once daily., Disp: , Rfl:     folic acid (FOLVITE) 1 MG tablet, Take 1 tablet (1 mg total) by mouth once daily., Disp: 100 tablet, Rfl: 3    magnesium 250 mg Tab, Take 1 tablet by mouth every evening., Disp: , Rfl:     ondansetron (ZOFRAN) 8 MG tablet, Take 1 tablet (8 mg total) by mouth every 8 (eight) hours as needed for Nausea., Disp: 30 tablet, Rfl: 5    pantoprazole (PROTONIX) 40 MG tablet, Take 1 tablet (40 mg total) by mouth once daily., Disp: 30 tablet, Rfl: 2    polyethylene glycol (GLYCOLAX) 17 gram/dose powder, Take 17 g by mouth once daily., Disp: , Rfl:     promethazine (PHENERGAN) 25 MG tablet, Take 25 mg by mouth every 6 (six) hours as needed for Nausea., Disp: , Rfl:     Current Facility-Administered Medications:     cyanocobalamin injection 1,000 mcg, 1,000 mcg, Subcutaneous, Q30 Days, Blake Donovan MD, 1,000 mcg at 08/10/20 1207        Objective:       Physical Examination:     /73   Pulse (!) 55   Temp 97.2 °F (36.2 °C)   Resp 18   Wt 110.7 kg (244 lb)   BMI 31.33 kg/m²     Physical Exam  Vitals signs reviewed.   Constitutional:       Appearance: He is well-developed.   HENT:      Head: Normocephalic and atraumatic.      Right Ear: External ear normal.      Left Ear: External ear normal.   Eyes:      General: No scleral icterus.     Conjunctiva/sclera: Conjunctivae normal.      Pupils: Pupils are equal, round, and reactive to light.   Neck:      Musculoskeletal: Normal range of motion and neck supple.   Cardiovascular:      Rate and Rhythm: Normal  rate and regular rhythm.      Heart sounds: Normal heart sounds. No murmur. No friction rub. No gallop.    Pulmonary:      Effort: Pulmonary effort is normal. No respiratory distress.      Breath sounds: Normal breath sounds. No rales.   Chest:      Chest wall: No tenderness.   Abdominal:      General: Bowel sounds are normal. There is no distension.      Palpations: Abdomen is soft. There is no mass.      Tenderness: There is no abdominal tenderness. There is no guarding or rebound.   Lymphadenopathy:      Head:      Right side of head: No tonsillar adenopathy.      Left side of head: No tonsillar adenopathy.      Cervical: No cervical adenopathy.      Upper Body:      Right upper body: No supraclavicular adenopathy.      Left upper body: No supraclavicular adenopathy.   Neurological:      Mental Status: He is alert and oriented to person, place, and time.   Psychiatric:         Behavior: Behavior normal.         Thought Content: Thought content normal.         Judgment: Judgment normal.         Labs:   Recent Results (from the past 336 hour(s))   CBC auto differential    Collection Time: 08/23/20  3:18 AM   Result Value Ref Range    WBC 2.35 (L) 3.90 - 12.70 K/uL    Hemoglobin 8.4 (L) 14.0 - 18.0 g/dL    Hematocrit 25.9 (L) 40.0 - 54.0 %    Platelets 92 (L) 150 - 350 K/uL   CBC auto differential    Collection Time: 08/22/20 11:21 AM   Result Value Ref Range    WBC 2.16 (L) 3.90 - 12.70 K/uL    Hemoglobin 8.1 (L) 14.0 - 18.0 g/dL    Hematocrit 25.6 (L) 40.0 - 54.0 %    Platelets 97 (L) 150 - 350 K/uL     CMP  Sodium   Date Value Ref Range Status   08/23/2020 140 136 - 145 mmol/L Final     Potassium   Date Value Ref Range Status   08/23/2020 4.3 3.5 - 5.1 mmol/L Final     Chloride   Date Value Ref Range Status   08/23/2020 105 95 - 110 mmol/L Final     CO2   Date Value Ref Range Status   08/23/2020 27 23 - 29 mmol/L Final     Glucose   Date Value Ref Range Status   08/23/2020 129 (H) 70 - 110 mg/dL Final     BUN,  Bld   Date Value Ref Range Status   08/23/2020 13 8 - 23 mg/dL Final     Creatinine   Date Value Ref Range Status   08/23/2020 0.9 0.5 - 1.4 mg/dL Final     Calcium   Date Value Ref Range Status   08/23/2020 9.1 8.7 - 10.5 mg/dL Final     Total Protein   Date Value Ref Range Status   08/23/2020 6.7 6.0 - 8.4 g/dL Final     Albumin   Date Value Ref Range Status   08/23/2020 3.1 (L) 3.5 - 5.2 g/dL Final     Total Bilirubin   Date Value Ref Range Status   08/23/2020 0.4 0.1 - 1.0 mg/dL Final     Comment:     For infants and newborns, interpretation of results should be based  on gestational age, weight and in agreement with clinical  observations.  Premature Infant recommended reference ranges:  Up to 24 hours.............<8.0 mg/dL  Up to 48 hours............<12.0 mg/dL  3-5 days..................<15.0 mg/dL  6-29 days.................<15.0 mg/dL       Alkaline Phosphatase   Date Value Ref Range Status   08/23/2020 81 55 - 135 U/L Final     AST   Date Value Ref Range Status   08/23/2020 15 10 - 40 U/L Final     ALT   Date Value Ref Range Status   08/23/2020 10 10 - 44 U/L Final     Anion Gap   Date Value Ref Range Status   08/23/2020 8 8 - 16 mmol/L Final     eGFR if    Date Value Ref Range Status   08/23/2020 >60.0 >60 mL/min/1.73 m^2 Final     eGFR if non    Date Value Ref Range Status   08/23/2020 >60.0 >60 mL/min/1.73 m^2 Final     Comment:     Calculation used to obtain the estimated glomerular filtration  rate (eGFR) is the CKD-EPI equation.        No results found for: CEA  Lab Results   Component Value Date    PSA 0.53 05/14/2010    PSA 0.44 12/16/2008    PSA 0.3 11/03/2007           Assessment/Plan:     Problem List Items Addressed This Visit     Malignant neoplasm of upper lobe of right lung     I had a long discussion with Mr. Do about the progression seen on the PET scan.  He is s/p four cycles of chemo/immuno and it seems this tumor may be refractory to chemotherapy.   This may still, however be responsive to immunotherapy and I discussed keeping this for 2 more cycles then getting a ct chest to check for stabilization.      In the meantime, I will investigate another are of biopsy for EGFR, ALK and Ros 1 of which could not be done due to insufficient tumor on the original biopsy. Patient is good with this approach.                Discussion:     Follow up in about 3 weeks (around 9/24/2020).      Electronically signed by Blake Hartman

## 2020-09-14 NOTE — TELEPHONE ENCOUNTER
Patient complains of bone pain with no relief from Tylenol and Motrin. Ultram sent for acute pain. Patient notified.

## 2020-09-14 NOTE — PLAN OF CARE
Problem: Fluid Volume Deficit  Goal: Fluid Balance  Outcome: Ongoing, Progressing  Intervention: Monitor and Manage Hypovolemia  Flowsheets (Taken 9/14/2020 1147)  Fluid/Electrolyte Management:   fluids provided   intravenous fluid replacement initiated

## 2020-09-16 NOTE — PROGRESS NOTES
PROGRESS NOTE    Subjective:       Patient ID: Anson Do is a 75 y.o. male.    4/20/2020:  Patient presents to [facility] with cough and sob and right pleural effusion.      4/21/2020:  Thoracentesis 2L fluid. Path c/w Adenocarcinoma. CTA shows RUL nodular density.      5/4/2020:  Right pleurex cath placed.    5/29/2020:  Needle biopsy of right lung lesion: adenocarcinoma  Caris Testing  PDL1  10%  BRAF   Neg  Trk  Neg  MMR  Prof  EGFR  NST  Ros 1  NST  ALK  NST    Chief Complaint:  No chief complaint on file.  Lung cancer follow up.     History of Present Illness:   Anson Do is a 75 y.o. male who presents for follow up of lung cancer.   He asked to be seen today has he is having increasing pain in his right ribs and upper chest.  Has been very uncomfortable with this.        Carbo/pem/pem  Cycle 1: 6/8/2020  Cycle 2: 6/29/2020  Cycle 3: 7/20/2020  Cycle 4: 8/10/2020    Family and Social history reviewed and is unchanged from 5/12/2020      ROS:  Review of Systems   Constitutional: Positive for unexpected weight change. Negative for appetite change and fever.   HENT: Negative for nosebleeds.    Eyes: Negative for visual disturbance.   Respiratory: Negative for cough, chest tightness and shortness of breath.    Cardiovascular: Negative for chest pain.   Gastrointestinal: Negative for abdominal pain, blood in stool and diarrhea.   Genitourinary: Negative for frequency and hematuria.   Musculoskeletal: Negative for back pain.   Skin: Negative for rash.   Neurological: Negative for headaches.   Hematological: Negative for adenopathy. Does not bruise/bleed easily.   Psychiatric/Behavioral: The patient is not nervous/anxious.           Current Outpatient Medications:     albuterol (PROVENTIL/VENTOLIN HFA) 90 mcg/actuation inhaler, Inhale 2 puffs into the lungs every 4 to 6 hours as needed for Shortness of Breath. Rescue (Patient not taking: Reported on 9/17/2020),  Disp: 18 g, Rfl: 3    apixaban (ELIQUIS) 5 mg Tab, Take 1 tablet (5 mg total) by mouth 2 (two) times daily., Disp: 60 tablet, Rfl: 5    colchicine (COLCRYS) 0.6 mg tablet, Take 1 tablet (0.6 mg total) by mouth once daily., Disp: 30 tablet, Rfl: 2    dexAMETHasone (DECADRON) 4 MG Tab, Take 4 mg by mouth every 12 (twelve) hours., Disp: , Rfl:     docusate sodium (COLACE) 100 MG capsule, Take 200 mg by mouth every evening., Disp: , Rfl:     fexofenadine (ALLEGRA) 180 MG tablet, Take 180 mg by mouth once daily., Disp: , Rfl:     folic acid (FOLVITE) 1 MG tablet, Take 1 tablet (1 mg total) by mouth once daily., Disp: 100 tablet, Rfl: 3    HYDROcodone-acetaminophen (NORCO) 7.5-325 mg per tablet, Take 1 tablet by mouth every 6 (six) hours as needed for Pain., Disp: 60 tablet, Rfl: 0    magnesium 250 mg Tab, Take 1 tablet by mouth every evening., Disp: , Rfl:     ondansetron (ZOFRAN) 8 MG tablet, Take 1 tablet (8 mg total) by mouth every 8 (eight) hours as needed for Nausea., Disp: 30 tablet, Rfl: 5    pantoprazole (PROTONIX) 40 MG tablet, Take 1 tablet (40 mg total) by mouth once daily., Disp: 30 tablet, Rfl: 2    polyethylene glycol (GLYCOLAX) 17 gram/dose powder, Take 17 g by mouth once daily., Disp: , Rfl:     promethazine (PHENERGAN) 25 MG tablet, Take 25 mg by mouth every 6 (six) hours as needed for Nausea., Disp: , Rfl:     traMADoL (ULTRAM) 50 mg tablet, Take 1 tablet (50 mg total) by mouth every 6 (six) hours as needed for Pain., Disp: 30 tablet, Rfl: 0    Current Facility-Administered Medications:     0.9%  NaCl infusion (for blood administration), , Intravenous, Q24H PRN, Blake Donovan MD    cyanocobalamin injection 1,000 mcg, 1,000 mcg, Subcutaneous, Q30 Days, Blake Donovan MD, 1,000 mcg at 08/10/20 1207    furosemide injection 20 mg, 20 mg, Intravenous, PRN, FREDERIC Zacarias, 20 mg at 09/17/20 0945        Objective:       Physical Examination:     /76   Pulse 64   Temp  96.6 °F (35.9 °C)   Resp 19   Wt 110.7 kg (244 lb)   BMI 31.33 kg/m²     Physical Exam  Vitals signs reviewed.   Constitutional:       Appearance: He is well-developed.   HENT:      Head: Normocephalic and atraumatic.      Right Ear: External ear normal.      Left Ear: External ear normal.   Eyes:      General: No scleral icterus.     Conjunctiva/sclera: Conjunctivae normal.      Pupils: Pupils are equal, round, and reactive to light.   Neck:      Musculoskeletal: Normal range of motion and neck supple.   Cardiovascular:      Rate and Rhythm: Normal rate and regular rhythm.      Heart sounds: Normal heart sounds. No murmur. No friction rub. No gallop.    Pulmonary:      Effort: Pulmonary effort is normal. No respiratory distress.      Breath sounds: Normal breath sounds. No rales.   Chest:      Chest wall: No tenderness.   Abdominal:      General: Bowel sounds are normal. There is no distension.      Palpations: Abdomen is soft. There is no mass.      Tenderness: There is no abdominal tenderness. There is no guarding or rebound.   Lymphadenopathy:      Head:      Right side of head: No tonsillar adenopathy.      Left side of head: No tonsillar adenopathy.      Cervical: No cervical adenopathy.      Upper Body:      Right upper body: No supraclavicular adenopathy.      Left upper body: No supraclavicular adenopathy.   Neurological:      Mental Status: He is alert and oriented to person, place, and time.   Psychiatric:         Behavior: Behavior normal.         Thought Content: Thought content normal.         Judgment: Judgment normal.         Labs:   Recent Results (from the past 336 hour(s))   CBC auto differential    Collection Time: 09/14/20  1:45 PM   Result Value Ref Range    WBC 3.22 (L) 3.90 - 12.70 K/uL    Hemoglobin 7.8 (L) 14.0 - 18.0 g/dL    Hematocrit 23.5 (L) 40.0 - 54.0 %    Platelets 173 150 - 350 K/uL     CMP  Sodium   Date Value Ref Range Status   09/14/2020 132 (L) 136 - 145 mmol/L Final      Potassium   Date Value Ref Range Status   09/14/2020 3.3 (L) 3.5 - 5.1 mmol/L Final     Chloride   Date Value Ref Range Status   09/14/2020 99 95 - 110 mmol/L Final     CO2   Date Value Ref Range Status   09/14/2020 26 23 - 29 mmol/L Final     Glucose   Date Value Ref Range Status   09/14/2020 122 (H) 70 - 110 mg/dL Final     BUN, Bld   Date Value Ref Range Status   09/14/2020 16 8 - 23 mg/dL Final     Creatinine   Date Value Ref Range Status   09/14/2020 0.8 0.5 - 1.4 mg/dL Final     Calcium   Date Value Ref Range Status   09/14/2020 8.2 (L) 8.7 - 10.5 mg/dL Final     Total Protein   Date Value Ref Range Status   09/14/2020 6.5 6.0 - 8.4 g/dL Final     Albumin   Date Value Ref Range Status   09/14/2020 2.8 (L) 3.5 - 5.2 g/dL Final     Total Bilirubin   Date Value Ref Range Status   09/14/2020 0.6 0.1 - 1.0 mg/dL Final     Comment:     For infants and newborns, interpretation of results should be based  on gestational age, weight and in agreement with clinical  observations.  Premature Infant recommended reference ranges:  Up to 24 hours.............<8.0 mg/dL  Up to 48 hours............<12.0 mg/dL  3-5 days..................<15.0 mg/dL  6-29 days.................<15.0 mg/dL       Alkaline Phosphatase   Date Value Ref Range Status   09/14/2020 63 55 - 135 U/L Final     AST   Date Value Ref Range Status   09/14/2020 21 10 - 40 U/L Final     ALT   Date Value Ref Range Status   09/14/2020 25 10 - 44 U/L Final     Anion Gap   Date Value Ref Range Status   09/14/2020 7 (L) 8 - 16 mmol/L Final     eGFR if    Date Value Ref Range Status   09/14/2020 >60.0 >60 mL/min/1.73 m^2 Final     eGFR if non    Date Value Ref Range Status   09/14/2020 >60.0 >60 mL/min/1.73 m^2 Final     Comment:     Calculation used to obtain the estimated glomerular filtration  rate (eGFR) is the CKD-EPI equation.        No results found for: CEA  Lab Results   Component Value Date    PSA 0.53 05/14/2010    PSA  0.44 12/16/2008    PSA 0.3 11/03/2007           Assessment/Plan:     Problem List Items Addressed This Visit     Malignant neoplasm of upper lobe of right lung - Primary     I had a long discussion with Mr. Do and his wife today.  His progressive pain makes me concerned that his cancer continues to progress.  I reviewed his PET images with him today and at this point I no longer feel like the immune therapy will be helpful here.  I discussed that radiation therapy to these areas of hot spots on the PET may help with his pain and will arrange for him to be seen by them this week.      He has also been quite dizzy lately and while this may be due to progressive cancer, I feel an MRI of the brain is warranted to rule out metastatic disease to the brain.      Once he has completed his radiation therapy we can sit down again to discuss further options on therapy and whether he actually wants to proceed with further therapy at all.           Relevant Medications    HYDROcodone-acetaminophen (NORCO) 7.5-325 mg per tablet    Other Relevant Orders    CT Biopsy Bone Deep (xpd)    MRI BRAIN W WO CONTRAST    Ambulatory referral/consult to Radiation Oncology          Discussion:     Follow up in about 3 weeks (around 10/7/2020).      Electronically signed by Blake Hartman

## 2020-09-17 NOTE — PATIENT INSTRUCTIONS
Follow up with Dr. Donovan as directed. Go to ER for any chest pain, shortness of breath or any other concerning symptoms.

## 2020-09-18 NOTE — ASSESSMENT & PLAN NOTE
I had a long discussion with Mr. Do and his wife today.  His progressive pain makes me concerned that his cancer continues to progress.  I reviewed his PET images with him today and at this point I no longer feel like the immune therapy will be helpful here.  I discussed that radiation therapy to these areas of hot spots on the PET may help with his pain and will arrange for him to be seen by them this week.      He has also been quite dizzy lately and while this may be due to progressive cancer, I feel an MRI of the brain is warranted to rule out metastatic disease to the brain.      Once he has completed his radiation therapy we can sit down again to discuss further options on therapy and whether he actually wants to proceed with further therapy at all.

## 2020-09-18 NOTE — PROGRESS NOTES
Met with patient to complete the NCCN Distress Screening and provide him with updated [facility] information.  Patient indicated a distress rating of 0.  Patient is having bone pain; he was referred to rad onc for consultation to see if radiation will reduce pain.  Pain is affecting his sleep; he is prescribed Norco 7.5mg.  Patient denied needing psychosocial support at this time.  I provided my contact information in the event he needs supportive services in the future.

## 2020-09-18 NOTE — PROGRESS NOTES
"Anson Do  [id number]  1945 9/17/2020  Blake Hartman Md  [address]  [address]  [place] [address]    Dx: Stage IVB NSCLC-adenoCA    HISTORY OF PRESENT ILLNESS:   Mr. Do is a 75M who has been under the tx of Dr. Hartman for metastatic NSCLC w/ extensive pleural spread/deposits who recently p/w profound weakness, fatigue, and generalized pain.  Thus he was referred for [facility] re: palliative RT.  His PET/CT shows mild progression since May 2020 but not blossoming of disease or obvious source of acute pain on imaging or exam today.  Since this presentation, however, he was noted to be anemic (chronic issue under tx before NSCLC dx) and was given a transfusion of 2 pRBC's which he reports "fixed everything!"  He reports minimal if any pain since the transfusion and reports improved energy beyond any day since dx w/o any residual dizziness, HA, falls, vision changes, n/v, or other acute neurologic change/concern.  He is having repeat CBC and a brain MRI today.    PET/CT (8/6/2020):      REVIEW OF SYSTEMS:  A complete ROS was performed and the patient denies any acute changes/concerns other than per HPI.    Past Medical History:   Diagnosis Date    Adenocarcinoma of lung 04/2020    Arm vein blood clot, right     Back pain     GERD (gastroesophageal reflux disease)     GERD (gastroesophageal reflux disease)     Gout     [place] (hard of hearing)     Pleural effusion on right 04/2020    SOB (shortness of breath)      Past Surgical History:   Procedure Laterality Date    BACK SURGERY  1975    ESOPHAGOGASTRODUODENOSCOPY N/A 6/18/2020    Procedure: EGD (ESOPHAGOGASTRODUODENOSCOPY);  Surgeon: Nisha Curry MD;  Location: [facility];  Service: Endoscopy;  Laterality: N/A;    hernina repair      INSERTION OF PLEURAL CATHETER Right 05/2020    pleur X Cath    INSERTION OF TUNNELED CENTRAL VENOUS CATHETER (CVC) WITH SUBCUTANEOUS PORT N/A 5/20/2020    Procedure: [id number];  Surgeon: " Yoli Rivas MD;  Location: [facility];  Service: General;  Laterality: N/A;    LUMBAR SPINE SURGERY      L3-l4    THORACENTESIS Right 2020    THORACENTESIS Right 2020    Procedure: PLEUREX REMOVAL;  Surgeon: Vincent Torres MD;  Location: [facility];  Service: Pulmonary;  Laterality: Right;     Social History     Socioeconomic History    Marital status:      Spouse name: Not on file    Number of children: Not on file    Years of education: Not on file    Highest education level: Not on file   Occupational History    Not on file   Social Needs    Financial resource strain: Not on file    Food insecurity     Worry: Not on file     Inability: Not on file    Transportation needs     Medical: Not on file     Non-medical: Not on file   Tobacco Use    Smoking status: Former Smoker     Packs/day: 2.00     Start date:      Quit date: 1988     Years since quittin.1   Substance and Sexual Activity    Alcohol use: Never     Frequency: Never    Drug use: Never    Sexual activity: Not on file   Lifestyle    Physical activity     Days per week: Not on file     Minutes per session: Not on file    Stress: Not on file   Relationships    Social connections     Talks on phone: Not on file     Gets together: Not on file     Attends [employer] service: Not on file     Active member of club or organization: Not on file     Attends meetings of clubs or organizations: Not on file     Relationship status: Not on file   Other Topics Concern    Not on file   Social History Narrative    Not on file     Family History   Problem Relation Age of Onset    Allergic rhinitis Neg Hx     Allergies Neg Hx     Angioedema Neg Hx     Asthma Neg Hx     Eczema Neg Hx     Immunodeficiency Neg Hx        PRIOR HISTORY OF CHEMOTHERAPY OR RADIOTHERAPY: Please see HPI for patients prior oncologic history.    Medication List with Changes/Refills   Current Medications    ALBUTEROL (PROVENTIL/VENTOLIN HFA) 90  MCG/ACTUATION INHALER    Inhale 2 puffs into the lungs every 4 to 6 hours as needed for Shortness of Breath. Rescue    APIXABAN (ELIQUIS) 5 MG TAB    Take 1 tablet (5 mg total) by mouth 2 (two) times daily.    COLCHICINE (COLCRYS) 0.6 MG TABLET    Take 1 tablet (0.6 mg total) by mouth once daily.    DEXAMETHASONE (DECADRON) 4 MG TAB    Take 4 mg by mouth every 12 (twelve) hours.    DOCUSATE SODIUM (COLACE) 100 MG CAPSULE    Take 200 mg by mouth every evening.    FEXOFENADINE (ALLEGRA) 180 MG TABLET    Take 180 mg by mouth once daily.    FOLIC ACID (FOLVITE) 1 MG TABLET    Take 1 tablet (1 mg total) by mouth once daily.    HYDROCODONE-ACETAMINOPHEN (NORCO) 7.5-325 MG PER TABLET    Take 1 tablet by mouth every 6 (six) hours as needed for Pain.    MAGNESIUM 250 MG TAB    Take 1 tablet by mouth every evening.    ONDANSETRON (ZOFRAN) 8 MG TABLET    Take 1 tablet (8 mg total) by mouth every 8 (eight) hours as needed for Nausea.    PANTOPRAZOLE (PROTONIX) 40 MG TABLET    Take 1 tablet (40 mg total) by mouth once daily.    POLYETHYLENE GLYCOL (GLYCOLAX) 17 GRAM/DOSE POWDER    Take 17 g by mouth once daily.    PROMETHAZINE (PHENERGAN) 25 MG TABLET    Take 25 mg by mouth every 6 (six) hours as needed for Nausea.    TRAMADOL (ULTRAM) 50 MG TABLET    Take 1 tablet (50 mg total) by mouth every 6 (six) hours as needed for Pain.     Review of patient's allergies indicates:  No Known Allergies    QUALITY OF LIFE: 80%- Normal Activity with Effort: Some Symptoms of Disease    There were no vitals filed for this visit.  There is no height or weight on file to calculate BMI.    PHYSICAL EXAM:   GENERAL: alert; in no apparent distress.   HEAD: normocephalic, atraumatic.  EYES: pupils are equal, round, reactive to light and accommodation. Sclera anicteric. Conjunctiva not injected.   NOSE/THROAT: no nasal erythema or rhinorrhea. Oropharynx pink, without erythema, ulcerations or thrush.   NECK: no cervical motion rigidity; supple with no  masses.  CHEST: clear to auscultation bilaterally; no wheezes, crackles or rubs. Patient is speaking comfortably on room air with normal work of breathing without using accessory muscles of respiration.  CARDIOVASCULAR: regular rate and rhythm; no murmurs, rubs or gallops.  ABDOMEN: soft, nontender, nondistended. Bowel sounds present.   MUSCULOSKELETAL: no tenderness to palpation along the spine or scapulae. Normal range of motion.  NEUROLOGIC: cranial nerves II-XII intact bilaterally. Strength 5/5 in bilateral upper and lower extremities. No sensory deficits appreciated. Reflexes globally intact. No cerebellar signs. Normal gait.  LYMPHATIC: no cervical, supraclavicular or axillary adenopathy appreciated bilaterally.   EXTREMITIES: no clubbing, cyanosis, edema.  SKIN: no erythema, rashes, ulcerations noted.     REVIEW OF IMAGING/PATHOLOGY/LABS: Please see HPI. All images reviewed personally by dictating physician.       ASSESSMENT: Anson Do is a 75 y.o. male with stage  IVB NSCLC-adenoCA    PLAN:  After review of the pt's chart/hx and eval/discussion w/ him today indicating near complete resolution of pain/dizziness symptoms after transfusion, as detailed in HPI, I explained that I see no acute indication for palliative RT at this time. Thus I explained the potential role of RT in his future/ongoing care and the potential signs/symptoms to report for such potential acute issues like brain metastases, painful bony lesions, airway/neuro compromising lesions, etc.  I shared my direct contact info so he can reach out w/ and questions or changes at any time.  He will RTC prn per patient and Dr. Donovan for any future palliative needs, and we will follow his braim MRI results, CBC, and overall status peripherally so we may call him in w/ any future RT indications.    DISPOSITION: RTC PRN    I have personally seen and evaluated this patient. I spent over one hour w/ the patient today, and greater than 50% of this  time was spent discussing coordination of care and/or counseling.    PHYSICIAN: En Maxwell III, MD    Thank you for the opportunity to meet and consult with Anson Do.   Please feel free to contact me to discuss the above recommendation further.

## 2020-09-21 NOTE — TELEPHONE ENCOUNTER
----- Message from Blake Donovan MD sent at 9/21/2020  1:23 PM CDT -----  Please call the patient regarding his MRI.  There is no cancer.      Spoke to Anson and let him know MRI was clear of any metastatic disease in his brain

## 2020-09-22 NOTE — TELEPHONE ENCOUNTER
----- Message from Blake Donovan MD sent at 9/21/2020  1:23 PM CDT -----  Please call the patient regarding his MRI.  There is no cancer.

## 2020-09-22 NOTE — PROGRESS NOTES
PROGRESS NOTE    Subjective:       Patient ID: Anson Do is a 75 y.o. male.    4/20/2020:  Patient presents to [facility] with cough and sob and right pleural effusion.      4/21/2020:  Thoracentesis 2L fluid. Path c/w Adenocarcinoma. CTA shows RUL nodular density.      5/4/2020:  Right pleurex cath placed.    5/29/2020:  Needle biopsy of right lung lesion: adenocarcinoma  Caris Testing  PDL1  10%  BRAF   Neg  Trk  Neg  MMR  Prof  EGFR  NST  Ros 1  NST  ALK  NST    Chief Complaint:  No chief complaint on file.  Lung cancer follow up.     History of Present Illness:   Anson Do is a 75 y.o. male who presents for follow up of lung cancer.     Patient had blood transfusion and felt much better at the time he saw radiation oncology.  No palliative chemo was offered.      Still has right rib pain.    Tramadol 50mg prescribed but patient has not taken this yet  Norco 5mg, 2 po tid which relieves pain.           Carbo/pem/pem  Cycle 1: 6/8/2020  Cycle 2: 6/29/2020  Cycle 3: 7/20/2020  Cycle 4: 8/10/2020    Family and Social history reviewed and is unchanged from 5/12/2020      ROS:  Review of Systems   Constitutional: Positive for unexpected weight change. Negative for appetite change and fever.   HENT: Negative for nosebleeds.    Eyes: Negative for visual disturbance.   Respiratory: Negative for cough, chest tightness and shortness of breath.    Cardiovascular: Negative for chest pain.   Gastrointestinal: Negative for abdominal pain, blood in stool and diarrhea.   Genitourinary: Negative for frequency and hematuria.   Musculoskeletal: Negative for back pain.   Skin: Negative for rash.   Neurological: Negative for headaches.   Hematological: Negative for adenopathy. Does not bruise/bleed easily.   Psychiatric/Behavioral: The patient is not nervous/anxious.           Current Outpatient Medications:     albuterol (PROVENTIL/VENTOLIN HFA) 90 mcg/actuation inhaler,  Inhale 2 puffs into the lungs every 4 to 6 hours as needed for Shortness of Breath. Rescue, Disp: 18 g, Rfl: 3    apixaban (ELIQUIS) 5 mg Tab, Take 1 tablet (5 mg total) by mouth 2 (two) times daily., Disp: 60 tablet, Rfl: 5    colchicine (COLCRYS) 0.6 mg tablet, Take 1 tablet (0.6 mg total) by mouth once daily., Disp: 30 tablet, Rfl: 2    docusate sodium (COLACE) 100 MG capsule, Take 200 mg by mouth every evening., Disp: , Rfl:     fexofenadine (ALLEGRA) 180 MG tablet, Take 180 mg by mouth once daily., Disp: , Rfl:     folic acid (FOLVITE) 1 MG tablet, Take 1 tablet (1 mg total) by mouth once daily., Disp: 100 tablet, Rfl: 3    HYDROcodone-acetaminophen (NORCO) 7.5-325 mg per tablet, Take 1 tablet by mouth every 6 (six) hours as needed for Pain., Disp: 60 tablet, Rfl: 0    magnesium 250 mg Tab, Take 1 tablet by mouth every evening., Disp: , Rfl:     ondansetron (ZOFRAN) 8 MG tablet, Take 1 tablet (8 mg total) by mouth every 8 (eight) hours as needed for Nausea., Disp: 30 tablet, Rfl: 5    pantoprazole (PROTONIX) 40 MG tablet, Take 1 tablet (40 mg total) by mouth once daily., Disp: 30 tablet, Rfl: 2    polyethylene glycol (GLYCOLAX) 17 gram/dose powder, Take 17 g by mouth once daily., Disp: , Rfl:     promethazine (PHENERGAN) 25 MG tablet, Take 25 mg by mouth every 6 (six) hours as needed for Nausea., Disp: , Rfl:     traMADoL (ULTRAM) 50 mg tablet, Take 1 tablet (50 mg total) by mouth every 6 (six) hours as needed for Pain., Disp: 30 tablet, Rfl: 0    dexAMETHasone (DECADRON) 4 MG Tab, 2 Tab twice a day the day before each chemo and the day after each chemo, Disp: 60 tablet, Rfl: 1    Current Facility-Administered Medications:     0.9%  NaCl infusion (for blood administration), , Intravenous, Q24H PRN, Blake Donovan MD    cyanocobalamin injection 1,000 mcg, 1,000 mcg, Subcutaneous, Q30 Days, Blake Donovan MD, 1,000 mcg at 09/22/20 1019    furosemide injection 20 mg, 20 mg, Intravenous,  NANETTE, Love Mckeon, NP-C, 20 mg at 09/17/20 0945        Objective:       Physical Examination:     /86   Pulse 73   Temp 96.7 °F (35.9 °C)   Resp 18   Wt 110.1 kg (242 lb 11.2 oz)   BMI 31.16 kg/m²     Physical Exam  Vitals signs reviewed.   Constitutional:       Appearance: He is well-developed.   HENT:      Head: Normocephalic and atraumatic.      Right Ear: External ear normal.      Left Ear: External ear normal.   Eyes:      General: No scleral icterus.     Conjunctiva/sclera: Conjunctivae normal.      Pupils: Pupils are equal, round, and reactive to light.   Neck:      Musculoskeletal: Normal range of motion and neck supple.   Cardiovascular:      Rate and Rhythm: Normal rate and regular rhythm.      Heart sounds: Normal heart sounds. No murmur. No friction rub. No gallop.    Pulmonary:      Effort: Pulmonary effort is normal. No respiratory distress.      Breath sounds: Normal breath sounds. No rales.   Chest:      Chest wall: No tenderness.   Abdominal:      General: Bowel sounds are normal. There is no distension.      Palpations: Abdomen is soft. There is no mass.      Tenderness: There is no abdominal tenderness. There is no guarding or rebound.   Lymphadenopathy:      Head:      Right side of head: No tonsillar adenopathy.      Left side of head: No tonsillar adenopathy.      Cervical: No cervical adenopathy.      Upper Body:      Right upper body: No supraclavicular adenopathy.      Left upper body: No supraclavicular adenopathy.   Neurological:      Mental Status: He is alert and oriented to person, place, and time.   Psychiatric:         Behavior: Behavior normal.         Thought Content: Thought content normal.         Judgment: Judgment normal.         Labs:   Recent Results (from the past 336 hour(s))   CBC auto differential    Collection Time: 09/18/20  9:14 AM   Result Value Ref Range    WBC 2.17 (L) 3.90 - 12.70 K/uL    Hemoglobin 9.9 (L) 14.0 - 18.0 g/dL    Hematocrit 30.6 (L) 40.0 -  54.0 %    Platelets 178 150 - 350 K/uL   CBC auto differential    Collection Time: 09/14/20  1:45 PM   Result Value Ref Range    WBC 3.22 (L) 3.90 - 12.70 K/uL    Hemoglobin 7.8 (L) 14.0 - 18.0 g/dL    Hematocrit 23.5 (L) 40.0 - 54.0 %    Platelets 173 150 - 350 K/uL     CMP  Sodium   Date Value Ref Range Status   09/18/2020 138 136 - 145 mmol/L Final     Potassium   Date Value Ref Range Status   09/18/2020 3.4 (L) 3.5 - 5.1 mmol/L Final     Chloride   Date Value Ref Range Status   09/18/2020 95 95 - 110 mmol/L Final     CO2   Date Value Ref Range Status   09/18/2020 31 (H) 23 - 29 mmol/L Final     Glucose   Date Value Ref Range Status   09/18/2020 135 (H) 70 - 110 mg/dL Final     BUN, Bld   Date Value Ref Range Status   09/18/2020 12 8 - 23 mg/dL Final     Creatinine   Date Value Ref Range Status   09/18/2020 0.9 0.5 - 1.4 mg/dL Final     Calcium   Date Value Ref Range Status   09/18/2020 9.4 8.7 - 10.5 mg/dL Final     Total Protein   Date Value Ref Range Status   09/18/2020 6.9 6.0 - 8.4 g/dL Final     Albumin   Date Value Ref Range Status   09/18/2020 2.9 (L) 3.5 - 5.2 g/dL Final     Total Bilirubin   Date Value Ref Range Status   09/18/2020 0.3 0.1 - 1.0 mg/dL Final     Comment:     For infants and newborns, interpretation of results should be based  on gestational age, weight and in agreement with clinical  observations.  Premature Infant recommended reference ranges:  Up to 24 hours.............<8.0 mg/dL  Up to 48 hours............<12.0 mg/dL  3-5 days..................<15.0 mg/dL  6-29 days.................<15.0 mg/dL       Alkaline Phosphatase   Date Value Ref Range Status   09/18/2020 75 55 - 135 U/L Final     AST   Date Value Ref Range Status   09/18/2020 22 10 - 40 U/L Final     ALT   Date Value Ref Range Status   09/18/2020 21 10 - 44 U/L Final     Anion Gap   Date Value Ref Range Status   09/18/2020 12 8 - 16 mmol/L Final     eGFR if    Date Value Ref Range Status   09/18/2020 >60.0 >60  mL/min/1.73 m^2 Final     eGFR if non    Date Value Ref Range Status   09/18/2020 >60.0 >60 mL/min/1.73 m^2 Final     Comment:     Calculation used to obtain the estimated glomerular filtration  rate (eGFR) is the CKD-EPI equation.        No results found for: CEA  Lab Results   Component Value Date    PSA 0.53 05/14/2010    PSA 0.44 12/16/2008    PSA 0.3 11/03/2007           Assessment/Plan:     Problem List Items Addressed This Visit     Malignant neoplasm of upper lobe of right lung     I had a long discussion with him today about his case. Rad/onc felt like patient had improved with blood transfusion and that he did not need xrt.  Patient is again having pain and fatigue.  I discussed with him that the radiologist reviewed his scans and felt like there was no reasonably safe area to biopsy.  Given this, I feel a liquid biopsy is not unreasonable and will arrange this.      While this is ongoing however, I feel he does need some chemotherapy to slow the growth of his cancer.  I discussed single agent taxotere with him today and reviewed the risks and benefits with him.  I will get this started and if the liquid biopsy provides a better option for therapy then a change can be made at that time.      Patient is agreeable with this plan.                 Discussion:     Follow up in about 3 weeks (around 10/13/2020).      Electronically signed by Blake Hartman

## 2020-09-22 NOTE — ASSESSMENT & PLAN NOTE
I had a long discussion with him today about his case. Rad/onc felt like patient had improved with blood transfusion and that he did not need xrt.  Patient is again having pain and fatigue.  I discussed with him that the radiologist reviewed his scans and felt like there was no reasonably safe area to biopsy.  Given this, I feel a liquid biopsy is not unreasonable and will arrange this.      While this is ongoing however, I feel he does need some chemotherapy to slow the growth of his cancer.  I discussed single agent taxotere with him today and reviewed the risks and benefits with him.  I will get this started and if the liquid biopsy provides a better option for therapy then a change can be made at that time.      Patient is agreeable with this plan.

## 2020-09-24 PROBLEM — D50.9 IRON DEFICIENCY ANEMIA: Status: ACTIVE | Noted: 2020-01-01

## 2020-09-24 NOTE — PROGRESS NOTES
FOLLOW-UP APPOINTMENT    PATIENT:   Anson Do  :    1945  MR#:    [id number]  DATE OF VISIT:  2020        Chief Complaint: Lung Cancer/ Chemo School    Interval History   Mr. Anson Do 76 yo male with Lung cancer starting treatment presents today for chemotherapy education.  He will be starting treatment with  with Taxotere every 3 weeks for the above diagnosis. He knows to have labs drawn every Wednesday at [place]. Patient complains of extreme fatigue and tires easily. Will order iron labs today and IV iron if needed.    Depression Patient Health Questionnaire 2020 2020 2020 9/3/2020 2020 2020 2020   Over the last two weeks how often have you been bothered by little interest or pleasure in doing things 1 0 0 0 0 0 0   Over the last two weeks how often have you been bothered by feeling down, depressed or hopeless 1 0 0 0 0 0 0   PHQ-2 Total Score 2 0 0 0 0 0 0   Over the last two weeks how often have you been bothered by trouble falling or staying asleep, or sleeping too much - - - - - - 0   Over the last two weeks how often have you been bothered by feeling tired or having little energy - - - - - - 0   Over the last two weeks how often have you been bothered by a poor appetite or overeating - - - - - - 0   Over the last two weeks how often have you been bothered by feeling bad about yourself - or that you are a failure or have let yourself or your family down - - - - - - 0   Over the last two weeks how often have you been bothered by trouble concentrating on things, such as reading the newspaper or watching television - - - - - - 0   Over the last two weeks how often have you been bothered by moving or speaking so slowly that other people could have noticed. Or the opposite - being so fidgety or restless that you have been moving around a lot more than usual. - - - - - - 0   Over the last two weeks how often have you been bothered by thoughts that you would be better  off dead, or of hurting yourself - - - - - - 0   If you checked off any problems, how difficult have these problems made it for you to do your work, take care of things at home or get along with other people? - - - - - - Not difficult at all   Total Score - - - - - - 0           Review of Systems   Constitutional: Positive for appetite change and fatigue. Negative for chills, diaphoresis, fever and unexpected weight change.   HENT:   Negative for hearing loss, lump/mass, mouth sores, nosebleeds and sore throat.    Respiratory: Positive for shortness of breath. Negative for chest tightness, cough, hemoptysis and wheezing.    Cardiovascular: Negative for chest pain, leg swelling and palpitations.   Gastrointestinal: Negative for abdominal distention, abdominal pain, blood in stool, constipation and diarrhea.   Genitourinary: Negative for difficulty urinating, dysuria, frequency and hematuria.    Musculoskeletal: Positive for arthralgias. Negative for back pain, flank pain, gait problem, myalgias and neck pain.   Neurological: Negative for gait problem, headaches and light-headedness.   Psychiatric/Behavioral: The patient is not nervous/anxious.        Patient Active Problem List   Diagnosis    Chronic rhinitis    Chronic conjunctivitis of both eyes    Recurrent right pleural effusion, malignant    Former smoker    Pulmonary nodule    Malignant neoplasm of upper lobe of right lung    Subclavian vein thrombosis, right    Acute on chronic anemia    Drug-induced pancytopenia    GERD (gastroesophageal reflux disease)    [place] (hard of hearing)    Iron deficiency anemia       Past Medical History:   Diagnosis Date    Adenocarcinoma of lung 04/2020    Arm vein blood clot, right     Back pain     GERD (gastroesophageal reflux disease)     GERD (gastroesophageal reflux disease)     Gout     [place] (hard of hearing)     Pleural effusion on right 04/2020    SOB (shortness of breath)        Past Surgical History:    Procedure Laterality Date    BACK SURGERY      ESOPHAGOGASTRODUODENOSCOPY N/A 2020    Procedure: EGD (ESOPHAGOGASTRODUODENOSCOPY);  Surgeon: Nisha Curry MD;  Location: [facility] ENDO;  Service: Endoscopy;  Laterality: N/A;    hernina repair      INSERTION OF PLEURAL CATHETER Right 2020    pleur X Cath    INSERTION OF TUNNELED CENTRAL VENOUS CATHETER (CVC) WITH SUBCUTANEOUS PORT N/A 2020    Procedure: [id number];  Surgeon: Yoli Rivas MD;  Location: [facility] OR;  Service: General;  Laterality: N/A;    LUMBAR SPINE SURGERY      L3-l4    THORACENTESIS Right 2020    THORACENTESIS Right 2020    Procedure: PLEUREX REMOVAL;  Surgeon: Vincent Torres MD;  Location: [facility] OR;  Service: Pulmonary;  Laterality: Right;       Social History     Socioeconomic History    Marital status:      Spouse name: Not on file    Number of children: Not on file    Years of education: Not on file    Highest education level: Not on file   Occupational History    Not on file   Social Needs    Financial resource strain: Not on file    Food insecurity     Worry: Not on file     Inability: Not on file    Transportation needs     Medical: Not on file     Non-medical: Not on file   Tobacco Use    Smoking status: Former Smoker     Packs/day: 2.00     Start date:      Quit date: 1988     Years since quittin.1   Substance and Sexual Activity    Alcohol use: Never     Frequency: Never    Drug use: Never    Sexual activity: Not on file   Lifestyle    Physical activity     Days per week: Not on file     Minutes per session: Not on file    Stress: Not on file   Relationships    Social connections     Talks on phone: Not on file     Gets together: Not on file     Attends Jain service: Not on file     Active member of club or organization: Not on file     Attends meetings of clubs or organizations: Not on file     Relationship status: Not on file   Other Topics Concern     Not on file   Social History Narrative    Not on file       Family History   Problem Relation Age of Onset    Allergic rhinitis Neg Hx     Allergies Neg Hx     Angioedema Neg Hx     Asthma Neg Hx     Eczema Neg Hx     Immunodeficiency Neg Hx          Current Outpatient Medications:     apixaban (ELIQUIS) 5 mg Tab, Take 1 tablet (5 mg total) by mouth 2 (two) times daily., Disp: 60 tablet, Rfl: 5    colchicine (COLCRYS) 0.6 mg tablet, Take 1 tablet (0.6 mg total) by mouth once daily., Disp: 30 tablet, Rfl: 2    dexAMETHasone (DECADRON) 4 MG Tab, 2 Tab twice a day the day before each chemo and the day after each chemo, Disp: 60 tablet, Rfl: 1    docusate sodium (COLACE) 100 MG capsule, Take 200 mg by mouth every evening., Disp: , Rfl:     fexofenadine (ALLEGRA) 180 MG tablet, Take 180 mg by mouth once daily., Disp: , Rfl:     folic acid (FOLVITE) 1 MG tablet, Take 1 tablet (1 mg total) by mouth once daily., Disp: 100 tablet, Rfl: 3    magnesium 250 mg Tab, Take 1 tablet by mouth every evening., Disp: , Rfl:     ondansetron (ZOFRAN) 8 MG tablet, Take 1 tablet (8 mg total) by mouth every 8 (eight) hours as needed for Nausea., Disp: 30 tablet, Rfl: 5    pantoprazole (PROTONIX) 40 MG tablet, Take 1 tablet (40 mg total) by mouth once daily., Disp: 30 tablet, Rfl: 2    promethazine (PHENERGAN) 25 MG tablet, Take 25 mg by mouth every 6 (six) hours as needed for Nausea., Disp: , Rfl:     traMADoL (ULTRAM) 50 mg tablet, Take 1 tablet (50 mg total) by mouth every 6 (six) hours as needed for Pain., Disp: 30 tablet, Rfl: 0    albuterol (PROVENTIL/VENTOLIN HFA) 90 mcg/actuation inhaler, Inhale 2 puffs into the lungs every 4 to 6 hours as needed for Shortness of Breath. Rescue, Disp: 18 g, Rfl: 3    HYDROcodone-acetaminophen (NORCO) 7.5-325 mg per tablet, Take 1 tablet by mouth every 6 (six) hours as needed for Pain., Disp: 60 tablet, Rfl: 0    polyethylene glycol (GLYCOLAX) 17 gram/dose powder, Take 17 g by  mouth once daily., Disp: , Rfl:     Current Facility-Administered Medications:     0.9%  NaCl infusion (for blood administration), , Intravenous, Q24H PRN, Blake Donovan MD    cyanocobalamin injection 1,000 mcg, 1,000 mcg, Subcutaneous, Q30 Days, Blake Donovan MD, 1,000 mcg at 09/22/20 1019    furosemide injection 20 mg, 20 mg, Intravenous, PRN, FREDERIC Zacarias, 20 mg at 09/17/20 0945    Review of patient's allergies indicates:  No Known Allergies      Physcial Examination  VITAL SIGNS:   Vitals:    09/24/20 0955   BP: 127/83   Pulse: 76   Temp: 97.5 °F (36.4 °C)   Weight: 109.6 kg (241 lb 11.2 oz)   PainSc:   6   PainLoc: Abdomen   Pain: 6/10 abdomen he continues to take Norco for the pain and needs no intervention at this time.        Weight: 109.6 kg (241 lb 11.2 oz)    Body surface area is 2.39 meters squared.      GENERAL:  Anson Do is healthy-appearing 75 y.o. male, in no distress.   EYES:   Pupils equal, round, reactive.  Conjunctivae, sclera and lids normal.  HEENT: Head normocephalic and atraumatic, without alopecia.  Oropharynx is unremarkable.  No icterus, jaundice, stomatitis, mucositis, or ulceration is noted.  Ears are clear and unremarkable.  Nose, nares, and septum are unremarkable.    NECK:   No masses.  Thyroid and trachea are normal.    BREASTS:  Deferred.  RESPIRATORY: Clear to auscultation bilaterally.  Symmetrically effortless expansion.  No wheezing and no stridor.    CV: Heart reveals regular rate and rhythm without murmur, rub, or gallops.  ABDOMEN: Soft, non-tender.  No masses, no hernias, and no rebound or rigidity are noted.  /RECTAL:  Deferred.  LYMPHATICS: No preauricular, submandibular, cervical, supraclavicular, axillary, lymphadenopathy.  MUSCULOSKELETAL:Fair musculature, no atrophy.  No arthritic changes.  No edema or cyanosis. Back is without gross abnormal curvature.   NEUROLOGICAL: Cranial nerves II-XII grossly intact.  Motor and sensory exam  intact.  SKIN:   No lesions, bruises, petechiae or rashes.  Good turgor.    PSYCHIATRIC: Patient is alert and oriented to time, place and person.  Mood and affect are appropriate.         Laboratory and Radiology   Lab Results   Component Value Date    WBC 5.74 09/24/2020    RBC 3.41 (L) 09/24/2020    HGB 10.1 (L) 09/24/2020    HCT 31.0 (L) 09/24/2020    MCV 91 09/24/2020    MCH 29.6 09/24/2020    MCHC 32.6 09/24/2020    RDW 16.2 (H) 09/24/2020     09/24/2020    MPV 8.3 (L) 09/24/2020    GRAN 4.8 09/24/2020    GRAN 82.8 (H) 09/24/2020    LYMPH 0.4 (L) 09/24/2020    LYMPH 6.4 (L) 09/24/2020    MONO 0.5 09/24/2020    MONO 8.4 09/24/2020    EOS 0.1 09/24/2020    BASO 0.01 09/24/2020    EOSINOPHIL 1.7 09/24/2020    BASOPHIL 0.2 09/24/2020     BMP  Lab Results   Component Value Date     09/24/2020    K 4.1 09/24/2020    CL 94 (L) 09/24/2020    CO2 28 09/24/2020    BUN 10 09/24/2020    CREATININE 1.0 09/24/2020    CALCIUM 9.3 09/24/2020    ANIONGAP 15 09/24/2020    ESTGFRAFRICA >60.0 09/24/2020    EGFRNONAA >60.0 09/24/2020     Anson Do  [id number]    [facility]    TITLE: PLAN OF CARE FOR THE CHEMOTHERAPY PATIENT / TEACHING PROTOCOL    PURPOSE: To involve the patient / significant other in the plan of care and to provide teaching to the significant other & patient receiving chemotherapy.    LEVEL: Independent.    CONTENT: The Plan of Care for the chemotherapy patient is individualized and appropriate to the patients needs, strengths, limitations, & goals.  Education includes information regarding chemotherapy side effects, the treatment itself, and self-care  Activities.    GOAL / OUTCOME STANDARDS    PHYSIOLOGIC: The client will remain free or experience minimal side effects or toxicities throughout the chemotherapy treatment period.     PSYCHOLOGIC: The client/significant others will demonstrate positive coping mechanisms in relation to chemotherapy and its side  effects.      COGINITIVE: The client/significant others will verbalize understanding of self-care measure to avoid/minimize side effects of the chemotherapy regime.    EVALUATION / COMMENT KEY:    V = Audiovisual/Video  S = Successfully meets outcome  N = Needs further instruction  NA = Not applicable to the patient  P = Previous knowledge  U = Unable to comprehend  * = See progress notes          PLAN OF CARE  INFORMATION TO BE DELIVERED / NURSING INTERVENTIONS DATE EVALUATION   1. Assessment of client/caregiver,         knowledge of cancer diagnosis,         and chemotherapy as a treatment. 1a. Evaluate patient/caregiver learning ability    b. Plan teaching sessions with patient/caregiver according to needs and present anxiety level/ability to learn.    c. Provide Chemotherapy Education Packet,        Mouth Care Protocol,         Specific Patient Education Sheets. 09/24/2020 S   2. Individual chemotherapy treatment         plan. 2a. Review of Chemotherapy Education handout from [employer]            09/24/2020   S   3. Knowledge Deficit & Self-Management of general side effects common to all chemotherapy:  a. Nausea/Vomiting  b.   Diarrhea  c. Mouth Care  d. Dental care  e. Constipation  f. Hair Loss  g. Potential for infection  h. Fatigue   3a. Reinforce that the majority of side effects from chemotherapy are reversible and are  controlled both in the hospital and at home        (blood counts recover, hair grows back).   b.  Refer to the following for reinforcement of         information post-treatment:  1. Mouth Care Protocol.  2. Bowel Protocol for constipation or diarrhea.  3.  Drug Specific Chemotherapy Information Sheets for each medication patient receiving.    09/24/2020     S     PLAN OF CARE  INFORMATION TO BE DELIVERED / NURSING INTERVENTIONS DATE EVALUATION   h. Potential for bleeding         i. Potential anemia/fatigue         j. Potential sunburn         k. Birth control measures  l. Safety  measures post treatment 4.  Chemotherapy Home Care Instruction  and Safety Information Sheet.  A. patient/caregivers to thoroughly cook shellfish (shrimp, crab, etc) to decrease the chance of infection.    B.  Use sunscreen and protective clothing while in the sun.   09/24/2020      4. Knowledge deficit & Self Management of Drug Specific  Side Effects.    a. BLADDER EFFECTS        (Hemorrhagic Cystitis)                  Preventable with adequate hydration; occurs 2-3 days or more post treatment.   1.  Instruct patient to:  a.   Void at least every 2 hours; increase intake.  b.   DO NOT hold urine; go when urge is felt.  c.    Empty bladder at bedtime and on         awakening.  d.   Observe for color changes (red to tea           colored), amount and frequency changes.  e.   Notify oncologist of any abnormalities           in urine or voiding or if you cannot               drink adequate fluids.   09/24/2020   S   b.   CHANGES IN URINE        COLOR:      1.   Instruct patient:  a.   Most evident in first 2-3 voidings after           administration.  b. Lasts less than 24 hours.  c. If urine is discolored 2 or more days post- treatment, notify oncologist.      09/24/2020 S   c.    KIDNEY EFFECTS           (Nephrotoxicity)   1.  Instruct patient to:  a.   Drink 8-16 glasses of fluid/day the day   pre-treatment and 3-4 days post-treatment to maintain hydration; the best way to minimize kidney problems.  b.   Notify oncologist immediately if unable to drink fluids or if changes are noted in urinary elimination.     09/24/2020   S   a. PULMONARY TOXICITY    1. Instruct patient to report symptoms such as shortness of breath, chest pain, shallow breathing, or chest wall discomfort to physician.  2. Reinforce preventative measures used by the health care team.  a. Baseline and periodic PFT and chest x-ray.   09/24/2020   S     PLAN OF CARE INFORMATION TO BE DELIVERED / NURSING INTERVENTIONS DATE EVALUATION   b. NERVE &  MUSCLE EFFECTS (neurotoxocity; neuropathy, possible visual/hearing changes)        3. Instruct patient to:    a. Report numbness or tingling of the hands/feet, loss of fine motor movement (buttoning shirt, tying shoelaces), or gait changes to your oncologist.  b. If numbness/tingling are present:  1. protect feet with shoes at all times.  2. Use gloves for washing dishes/gardening & potholders in kitchen.       09/24/2020   S   c. CARDIOTOXICITY  Decreased effectiveness of             cardiac function. Effective are                  cumulative and irreversible.                                    CARDIAC ARRYTHMIAS              4   Instruct:  a. Heart function may be tested before treatment and perdiocally during treatment.  b. Notify oncologist of irregular pulse, palpitations, shortness of breath, or swelling in lower extremities/feet.         Taxotere can cause arrhythmias on infusion that resolve once infusion discontinued. Instruct nurse if any irregularity felt.    09/24/2020   S   d. EXTRAVASTION  Occurs when vesicants leak outside of vein and cause damage to the skin and underlying tissues.   1. Reinforce preventive measures used to avoid complications.  a. Fresh IV site or central line monitored continuously with vesicant IVP.  b. Continuous infusion via central line site and blood return monitored periodically around the clock.  2. Instruct to:  a. Notify nurse of any discomfort, burning, stinging, etc. at IV site during chemotherapy administration.  b. Notify oncologist of any redness, pain, or swelling at IV site after discharge from hospital.   09/24/2020   S   e. HYPERSENSITIVITY can happen with any medication.   1. Instruct patient:  a. Nurse is with them during the initial part of treatment and will be close by to monitor.  b. Pre-medication ordered by the oncologist must be taken on time. If doses are missed, treatment will need to be re-scheduled.  c. Skin redness, itching, or hives appearing after  discharge should be reported to oncologist. 09/24/2020   S       PLAN OF CARE INFORMATION TO BE DELIVERED / NURSING INTERVENTIONS DATE EVALUATION   f. FLU-LIKE SYNDROME      1. Instruct patient symptoms are hard to prevent and may include fever, shaking chills, muscle and body aches.  a. Taking prescribed medications from physician if needed.  b. Adequate fluids are important.    2. Reinforce the need to call if temperature is         elevated to 100.4 or more  09/24/2020   S   g. HAND-FOOT SYNDROME  causes painful, symmetric swelling and redness of palms and soles                  5. Instruct patient to report any numbness or tingling in the hands or feet.  6. Explain prevention techniques, such as     a. Use heavy moisturizers to lessen skin dryness and itching, but to avoid if skin is cracked or broken  b. Bathe in tepid water, use non-perfumed soap, and wash gently. Baths with oatmeal or diluted baking soda may be soothing.  c. Avoid tight fitting shoes and repetitive actions, such as rubbing hands or applying pressure to hands/feet.  7. Review measures to take should syndrome occur:  a. Cold compresses and elevation for          edema  b. Pain medications and other measures as ordered by oncologist.   4.   Syndrome resolves few weeks after therapy. 09/24/2020   S   5. DISCHARGE PLANNING /        EDUCATION 1.    Explain importance of compliance with follow- up  tests (CBC, CMP).  2.    Verify patient/caregiver know:  a.    Oncologists office phone number.  b.    Dates of follow-up appointments.  c.    Prescriptions given for nausea  3.   Review side effects to monitor and notify          oncologist about.  4.   Reinforce the need for patient and caregivers to:  a.    Review information given.  b.    Call oncologists office with questions          or symptoms  5.   Provide [facility] Brochure make referrals if needed for financial or .   09/24/2020   S     PROGRESS NOTES: I met with  the patient and his sister today for chemotherapy education. he will be starting treatment with Taxotere. We discussed the mechanism of action, potential side effects of this treatment as well as ways he can manage them at home. Some of these side effects include but or not limited to fever, nausea, vomiting, decreased appetite, fatigue, weakness, cytopenias, myalgia/arthralgia, constipation, diarrhea, bleeding, headache, shortness of breath, nail changes, taste change, hair thinning/loss, mood disturbances, or edema. We also discussed dietary modifications he should make although this will be discussed in more detail with the dietician. he was provided with anti-emetic medication, a copy of all of the information we discussed today as well as our contact information. he will be provided a schedule on his first day of treatment. We will obtain labs on a weekly basis and the patient will follow-up with the physician for toxicity monitoring throughout treatment. All questions were answered and an informed consent was obtained. he was reminded to certainly contact us sooner if needed.  Attached to the patients folder and discussed with the patient the 24 hour/ 7 days a week after hours telephone number for the physician.  Patient notified to call anytime 24/7 because their is a physician on call for any problems that may arise.  Patient also notified to report to [facility] if they can not get in touch with a physician after hours.  Discussed the five wishes booklet with the patient and their family.        Diagnosis:  1. Malignant neoplasm of upper lobe of right lung  Ambulatory referral/consult to [facility] Nurse Navigator    Ambulatory referral/consult to Hematology/Oncology/Nutrition    Ambulatory referral/consult to Oncology Social Work   2. Normocytic anemia  Iron and TIBC    FERRITIN   3. Iron deficiency anemia, unspecified iron deficiency anemia type           Assessment/Plan   1. Lung Cancer- Starting  treatment with Taxotere every 3 weeks.  2. Nausea- Zofran and Phenergan PRN        Medications Ordered:  Zofran 8mg 1 tab PO Q8h prn nausea  Phenergan 25mg PO Q4-6h prn nausea        Standing Labs Ordered:  CBC weekly  CMP weekly  TSH Monthly        Total Face to Face Time: 60 minutes face to face with the patient and his sister discussing the chemotherapy, side effects and when to call our office.    Electronically signed by Love Mckeon, MSN, APRN, AGNP-C, OCN

## 2020-09-25 NOTE — PROGRESS NOTES
Medical Nutrition Therapy Oncology Progress Note      Patient's PCP:Vincent Torres MD  Referring Provider: Love Mckeon    Recommendations/Interventions     RD Notes  Nutrition consult received by KEITH Mckeon NP. Mr. Do was recently being treated with Carboplatin/Alimta/Keytruda, but is now starting a new regimen with Taxotere. Today he endorses slightly decreased appetite, but he is still eating well & drinking plenty of fluids. He endorses being in pain today- unable to get pain under control last night/this morning. Wt stable @143#.     Plan  1. Continue with high-kcal/protein diet via small, frequent meals as tolerated.   2. Pain management as recommended by oncologist.   3. Encouraged increased fluid intake.   4. Pt has RD contact info. Encouraged him to call with any questions/concerns.     Subjective:        Patient ID: Anson oD is a 75 y.o. male.    Chief Complaint: nutrition consult    Past Medical History:   Diagnosis Date    Adenocarcinoma of lung 04/2020    Arm vein blood clot, right     Back pain     GERD (gastroesophageal reflux disease)     GERD (gastroesophageal reflux disease)     Gout     [place] (hard of hearing)     Pleural effusion on right 04/2020    SOB (shortness of breath)        Past Surgical History:   Procedure Laterality Date    BACK SURGERY  1975    ESOPHAGOGASTRODUODENOSCOPY N/A 6/18/2020    Procedure: EGD (ESOPHAGOGASTRODUODENOSCOPY);  Surgeon: Nisha Curry MD;  Location: [facility];  Service: Endoscopy;  Laterality: N/A;    hernina repair      INSERTION OF PLEURAL CATHETER Right 05/2020    pleur X Cath    INSERTION OF TUNNELED CENTRAL VENOUS CATHETER (CVC) WITH SUBCUTANEOUS PORT N/A 5/20/2020    Procedure: [id number];  Surgeon: Yoli Rivas MD;  Location: [facility] OR;  Service: General;  Laterality: N/A;    LUMBAR SPINE SURGERY      L3-l4    THORACENTESIS Right 04/2020    THORACENTESIS Right 7/27/2020     Procedure: PLEUREX REMOVAL;  Surgeon: Vincent Torres MD;  Location: [facility];  Service: Pulmonary;  Laterality: Right;       Social History     Socioeconomic History    Marital status:      Spouse name: Not on file    Number of children: Not on file    Years of education: Not on file    Highest education level: Not on file   Occupational History    Not on file   Social Needs    Financial resource strain: Not on file    Food insecurity     Worry: Not on file     Inability: Not on file    Transportation needs     Medical: Not on file     Non-medical: Not on file   Tobacco Use    Smoking status: Former Smoker     Packs/day: 2.00     Start date:      Quit date: 1988     Years since quittin.1   Substance and Sexual Activity    Alcohol use: Never     Frequency: Never    Drug use: Never    Sexual activity: Not on file   Lifestyle    Physical activity     Days per week: Not on file     Minutes per session: Not on file    Stress: Not on file   Relationships    Social connections     Talks on phone: Not on file     Gets together: Not on file     Attends Hoahaoism service: Not on file     Active member of club or organization: Not on file     Attends meetings of clubs or organizations: Not on file     Relationship status: Not on file   Other Topics Concern    Not on file   Social History Narrative    Not on file       Family History   Problem Relation Age of Onset    Allergic rhinitis Neg Hx     Allergies Neg Hx     Angioedema Neg Hx     Asthma Neg Hx     Eczema Neg Hx     Immunodeficiency Neg Hx        Review of patient's allergies indicates:  No Known Allergies    Current Outpatient Medications:     albuterol (PROVENTIL/VENTOLIN HFA) 90 mcg/actuation inhaler, Inhale 2 puffs into the lungs every 4 to 6 hours as needed for Shortness of Breath. Rescue, Disp: 18 g, Rfl: 3    apixaban (ELIQUIS) 5 mg Tab, Take 1 tablet (5 mg total) by mouth 2 (two) times daily., Disp: 60 tablet, Rfl: 5     colchicine (COLCRYS) 0.6 mg tablet, Take 1 tablet (0.6 mg total) by mouth once daily., Disp: 30 tablet, Rfl: 2    dexAMETHasone (DECADRON) 4 MG Tab, 2 Tab twice a day the day before each chemo and the day after each chemo, Disp: 60 tablet, Rfl: 1    docusate sodium (COLACE) 100 MG capsule, Take 200 mg by mouth every evening., Disp: , Rfl:     fexofenadine (ALLEGRA) 180 MG tablet, Take 180 mg by mouth once daily., Disp: , Rfl:     folic acid (FOLVITE) 1 MG tablet, Take 1 tablet (1 mg total) by mouth once daily., Disp: 100 tablet, Rfl: 3    HYDROcodone-acetaminophen (NORCO) 7.5-325 mg per tablet, Take 1 tablet by mouth every 6 (six) hours as needed for Pain., Disp: 60 tablet, Rfl: 0    magnesium 250 mg Tab, Take 1 tablet by mouth every evening., Disp: , Rfl:     ondansetron (ZOFRAN) 8 MG tablet, Take 1 tablet (8 mg total) by mouth every 8 (eight) hours as needed for Nausea., Disp: 30 tablet, Rfl: 5    pantoprazole (PROTONIX) 40 MG tablet, Take 1 tablet (40 mg total) by mouth once daily., Disp: 30 tablet, Rfl: 2    polyethylene glycol (GLYCOLAX) 17 gram/dose powder, Take 17 g by mouth once daily., Disp: , Rfl:     promethazine (PHENERGAN) 25 MG tablet, Take 25 mg by mouth every 6 (six) hours as needed for Nausea., Disp: , Rfl:     traMADoL (ULTRAM) 50 mg tablet, Take 1 tablet (50 mg total) by mouth every 6 (six) hours as needed for Pain., Disp: 30 tablet, Rfl: 0    Current Facility-Administered Medications:     0.9%  NaCl infusion (for blood administration), , Intravenous, Q24H PRN, Blake Donovan MD    cyanocobalamin injection 1,000 mcg, 1,000 mcg, Subcutaneous, Q30 Days, Blake Donovan MD, 1,000 mcg at 09/22/20 1019    furosemide injection 20 mg, 20 mg, Intravenous, PRN, FREDERIC Zacarias, 20 mg at 09/17/20 0945    Facility-Administered Medications Ordered in Other Visits:     alteplase injection 2 mg, 2 mg, Intra-Catheter, PRN, FREDERIC Zacarias    dexamethasone IVPB 20 mg, 20 mg,  Intravenous, 1 time in Clinic/HOD, FREDERIC Zacarias, Last Rate: 150 mL/hr at 09/25/20 0838, 20 mg at 09/25/20 0838    DOCEtaxeL (TAXOTERE) 50 mg/m2 = 120 mg in sodium chloride 0.9% 262 mL chemo infusion, 50 mg/m2 (Treatment Plan Recorded), Intravenous, 1 time in Clinic/HOD, FREDERIC Zacarias    heparin, porcine (PF) 100 unit/mL injection flush 500 Units, 500 Units, Intravenous, PRN, FREDERIC Zacarias    ondansetron (ZOFRAN) 16 mg in NS 50 mL IVPB, 16 mg, Intravenous, 1 time in Clinic/HOD, FREDERIC Zacarias    sodium chloride 0.9% 250 mL flush bag, , Intravenous, 1 time in Clinic/HOD, FREDERIC Zacarias    sodium chloride 0.9% bolus 1,000 mL, 1,000 mL, Intravenous, 1 time in Clinic/HOD, FREDERIC Zacarias, Last Rate: 500 mL/hr at 09/25/20 0810, 1,000 mL at 09/25/20 0810    sodium chloride 0.9% flush 10 mL, 10 mL, Intravenous, PRN, FREDERIC Zacarias    All medications and past history have been reviewed.    OP DOCETAXEL (75 MG/M2) Q3W      Treatment Goal:   Control      Status:   Active      Start Date:   9/25/2020      End Date:   11/27/2020 (Planned)      Provider:   Blake Donovan MD      Chemotherapy:   DOCEtaxeL (TAXOTERE) 50 mg/m2 = 120 mg in sodium chloride 0.9% 262 mL chemo infusion, 50 mg/m2 = 120 mg (100 % of original dose 50 mg/m2), Intravenous, Clinic/HOD 1 time, 1 of 4 cycles    Dose modification: 50 mg/m2 (original dose 50 mg/m2, Cycle 1)      Objective:        Wt Readings from Last 1 Encounters:   09/25/20 0821 110.2 kg (243 lb)       Last Labs:  Glucose   Date Value Ref Range Status   09/24/2020 115 (H) 70 - 110 mg/dL Final   09/18/2020 135 (H) 70 - 110 mg/dL Final     BUN, Bld   Date Value Ref Range Status   09/24/2020 10 8 - 23 mg/dL Final   09/18/2020 12 8 - 23 mg/dL Final     Creatinine   Date Value Ref Range Status   09/24/2020 1.0 0.5 - 1.4 mg/dL Final   09/18/2020 0.9 0.5 - 1.4 mg/dL Final     Sodium   Date Value Ref Range Status   09/24/2020 137 136 - 145 mmol/L  Final   09/18/2020 138 136 - 145 mmol/L Final     Potassium   Date Value Ref Range Status   09/24/2020 4.1 3.5 - 5.1 mmol/L Final   09/18/2020 3.4 (L) 3.5 - 5.1 mmol/L Final     Phosphorus   Date Value Ref Range Status   08/23/2020 3.5 2.7 - 4.5 mg/dL Final     Calcium   Date Value Ref Range Status   09/24/2020 9.3 8.7 - 10.5 mg/dL Final   09/18/2020 9.4 8.7 - 10.5 mg/dL Final     No results found for: PREALBUMIN  Total Protein   Date Value Ref Range Status   09/24/2020 7.2 6.0 - 8.4 g/dL Final   09/18/2020 6.9 6.0 - 8.4 g/dL Final     Cholesterol   Date Value Ref Range Status   05/14/2010 202 (H) 120 - 199 mg/dL Final     Comment:     The National Cholesterol Education Program (NCEP) has set the  following guidelines (reference ranges) for Cholesterol:  Optimal.....................<200 mg/dL  Borderline High.............200-239 mg/dL  High........................> or = 240 mg/dL     12/16/2008 199 120 - 199 mg/dL Final     Comment:     The National Cholesterol Education Program (NCEP) has set the  following guidelines (reference ranges) for Cholesterol:  Optimal.....................<200 mg/dL  Borderline High.............200-239 mg/dL  High........................> or = 240 mg/dL       No results found for: HGBA1C  Hemoglobin   Date Value Ref Range Status   09/24/2020 10.1 (L) 14.0 - 18.0 g/dL Final   09/18/2020 9.9 (L) 14.0 - 18.0 g/dL Final     Comment:     Patient received blood.     Hematocrit   Date Value Ref Range Status   09/24/2020 31.0 (L) 40.0 - 54.0 % Final   09/18/2020 30.6 (L) 40.0 - 54.0 % Final     Comment:     Patient received blood.     Iron   Date Value Ref Range Status   09/24/2020 19 (L) 45 - 160 ug/dL Final   06/17/2020 20 (L) 45 - 160 ug/dL Final     No components found for: FROLATE  No results found for: [id number]  WBC   Date Value Ref Range Status   09/24/2020 5.74 3.90 - 12.70 K/uL Final   09/18/2020 2.17 (L) 3.90 - 12.70 K/uL Final       Assessment:     Nutrition/Diet History      Patient Reported Diet/Restrictions/Preferences: regular diet   Food Allergies: NKFA  Factors Affecting Nutritional Intake: none    Estimated/Assessed Needs     Weight Used For Calorie Calculations: 110.2 kg (243 lb)  Energy Calorie Requirements (kcal): 3409-3213 kcal/day   Energy Need Method: 25-28 Kcal/kg  Protein Requirements: 132-154 g/day   Protein Need Method: 1.2-1.4 g/kg  Fluid Requirements: 2755 ml/day  Estimated Fluid Requirement Method: 1ml/kcal      Nutrition Support  N/A    Evaluation of Received Nutrient/Fluid Intake     Calorie Intake: meeting needs  Protein Intake: meeting needs  Fluid Intake: meeting needs  Tolerance: tolerating  % Intake of Estimated Energy Needs: 100 %      Nutrition Diagnosis Related to (Etiology) As Evidenced By (Signs/Symptoms)   No nutrition diagnosis at this time         RD Notes  Nutrition consult received by KEITH Mckeon NP. Mr. Do was recently being treated with Carboplatin/Alimta/Keytruda, but is now starting a new regimen with Taxotere. Today he endorses slightly decreased appetite, but he is still eating well & drinking plenty of fluids. He endorses being in pain today- unable to get pain under control last night/this morning. Wt stable @143#.     Nutrition Intervention:      Nutrition Intervention Energy-modified diet   Goals/Expected Outcomes Consume a high-kcal/protein diet to promote wt maintenance.    Progress Progressing towards goal       Plan  1. Continue with high-kcal/protein diet via small, frequent meals as tolerated.   2. Pain management as recommended by oncologist.   3. Encouraged increased fluid intake.   4. Pt has RD contact info. Encouraged him to call with any questions/concerns.     Monitoring/Evaluation:     Monitor: wt, p.o. intake    Next Visit: Will f/u prn.       I have explained and the patient understands all of  the current recommendation(s). I have answered all of their questions to the best of my ability and to their complete  satisfaction.   The patient is to continue with the current management plan.    Electronically signed by: Aurora Clinton MS, RDN, LDN

## 2020-10-13 NOTE — ASSESSMENT & PLAN NOTE
Patient has decided that he does not want to pursue further therapy at this time.  We discussed end of life issues and I stated he likely had between 6 and 8 months of expected survival at this time.      He would like to be enrolled in hospice.  I reviewed this service with him and what they offer.  I will have this arranged for him.      Can return with me prn.

## 2020-10-13 NOTE — PROGRESS NOTES
PROGRESS NOTE    Subjective:       Patient ID: Anson Do is a 75 y.o. male.    4/20/2020:  Patient presents to [facility] with cough and sob and right pleural effusion.      4/21/2020:  Thoracentesis 2L fluid. Path c/w Adenocarcinoma. CTA shows RUL nodular density.      5/4/2020:  Right pleurex cath placed.    5/29/2020:  Needle biopsy of right lung lesion: adenocarcinoma  Caris Testing  PDL1  10%  BRAF   Neg  Trk  Neg  MMR  Prof  EGFR  NST  Ros 1  NST  ALK  NST    Chief Complaint:  No chief complaint on file.  Lung cancer follow up.     History of Present Illness:   Anson Do is a 75 y.o. male who presents for follow up of lung cancer.   Patient was admitted to the hospital last week with sob.  Feeling better today.       Carbo/pem/pem  Cycle 1: 6/8/2020  Cycle 2: 6/29/2020  Cycle 3: 7/20/2020  Cycle 4: 8/10/2020    Family and Social history reviewed and is unchanged from 5/12/2020      ROS:  Review of Systems   Constitutional: Positive for unexpected weight change. Negative for appetite change and fever.   HENT: Negative for nosebleeds.    Eyes: Negative for visual disturbance.   Respiratory: Negative for cough, chest tightness and shortness of breath.    Cardiovascular: Negative for chest pain.   Gastrointestinal: Negative for abdominal pain, blood in stool and diarrhea.   Genitourinary: Negative for frequency and hematuria.   Musculoskeletal: Negative for back pain.   Skin: Negative for rash.   Neurological: Negative for headaches.   Hematological: Negative for adenopathy. Does not bruise/bleed easily.   Psychiatric/Behavioral: The patient is not nervous/anxious.           Current Outpatient Medications:     albuterol (PROVENTIL/VENTOLIN HFA) 90 mcg/actuation inhaler, Inhale 2 puffs into the lungs every 4 to 6 hours as needed for Shortness of Breath. Rescue, Disp: 18 g, Rfl: 3    apixaban (ELIQUIS) 5 mg Tab, Take 1 tablet (5 mg total) by mouth 2 (two)  times daily., Disp: 60 tablet, Rfl: 5    colchicine (COLCRYS) 0.6 mg tablet, Take 1 tablet (0.6 mg total) by mouth once daily., Disp: 30 tablet, Rfl: 2    dexAMETHasone (DECADRON) 4 MG Tab, 2 Tab twice a day the day before each chemo and the day after each chemo, Disp: 60 tablet, Rfl: 1    docusate sodium (COLACE) 100 MG capsule, Take 200 mg by mouth every evening., Disp: , Rfl:     fexofenadine (ALLEGRA) 180 MG tablet, Take 180 mg by mouth once daily., Disp: , Rfl:     folic acid (FOLVITE) 1 MG tablet, Take 1 tablet (1 mg total) by mouth once daily., Disp: 100 tablet, Rfl: 3    HYDROcodone-acetaminophen (NORCO) 7.5-325 mg per tablet, Take 1 tablet by mouth every 6 (six) hours as needed for Pain., Disp: 60 tablet, Rfl: 0    magnesium 250 mg Tab, Take 1 tablet by mouth every evening., Disp: , Rfl:     ondansetron (ZOFRAN) 8 MG tablet, Take 1 tablet (8 mg total) by mouth every 8 (eight) hours as needed for Nausea., Disp: 30 tablet, Rfl: 5    pantoprazole (PROTONIX) 40 MG tablet, Take 1 tablet (40 mg total) by mouth once daily., Disp: 30 tablet, Rfl: 2    polyethylene glycol (GLYCOLAX) 17 gram/dose powder, Take 17 g by mouth once daily., Disp: , Rfl:     promethazine (PHENERGAN) 25 MG tablet, Take 25 mg by mouth every 6 (six) hours as needed for Nausea., Disp: , Rfl:     traMADoL (ULTRAM) 50 mg tablet, Take 1 tablet (50 mg total) by mouth every 6 (six) hours as needed for Pain., Disp: 30 tablet, Rfl: 0    Current Facility-Administered Medications:     0.9%  NaCl infusion (for blood administration), , Intravenous, Q24H PRN, Blake Donovan MD    cyanocobalamin injection 1,000 mcg, 1,000 mcg, Subcutaneous, Q30 Days, Blake Donovan MD, 1,000 mcg at 09/22/20 1019    furosemide injection 20 mg, 20 mg, Intravenous, PRN, FREDERIC Zacarias, 20 mg at 09/17/20 0945        Objective:       Physical Examination:     /77   Pulse 76   Temp 98.6 °F (37 °C)   Wt 102.6 kg (226 lb 1.6 oz)   BMI  29.03 kg/m²     Physical Exam  Vitals signs reviewed.   Constitutional:       Appearance: He is well-developed.   HENT:      Head: Normocephalic and atraumatic.      Right Ear: External ear normal.      Left Ear: External ear normal.   Eyes:      General: No scleral icterus.     Conjunctiva/sclera: Conjunctivae normal.      Pupils: Pupils are equal, round, and reactive to light.   Neck:      Musculoskeletal: Normal range of motion and neck supple.   Cardiovascular:      Rate and Rhythm: Normal rate and regular rhythm.      Heart sounds: Normal heart sounds. No murmur. No friction rub. No gallop.    Pulmonary:      Effort: Pulmonary effort is normal. No respiratory distress.      Breath sounds: Normal breath sounds. No rales.   Chest:      Chest wall: No tenderness.   Abdominal:      General: Bowel sounds are normal. There is no distension.      Palpations: Abdomen is soft. There is no mass.      Tenderness: There is no abdominal tenderness. There is no guarding or rebound.   Lymphadenopathy:      Head:      Right side of head: No tonsillar adenopathy.      Left side of head: No tonsillar adenopathy.      Cervical: No cervical adenopathy.      Upper Body:      Right upper body: No supraclavicular adenopathy.      Left upper body: No supraclavicular adenopathy.   Neurological:      Mental Status: He is alert and oriented to person, place, and time.   Psychiatric:         Behavior: Behavior normal.         Thought Content: Thought content normal.         Judgment: Judgment normal.         Labs:   No results found for this or any previous visit (from the past 336 hour(s)).  CMP  Sodium   Date Value Ref Range Status   09/24/2020 137 136 - 145 mmol/L Final     Potassium   Date Value Ref Range Status   09/24/2020 4.1 3.5 - 5.1 mmol/L Final     Chloride   Date Value Ref Range Status   09/24/2020 94 (L) 95 - 110 mmol/L Final     CO2   Date Value Ref Range Status   09/24/2020 28 23 - 29 mmol/L Final     Glucose   Date Value  Ref Range Status   09/24/2020 115 (H) 70 - 110 mg/dL Final     BUN, Bld   Date Value Ref Range Status   09/24/2020 10 8 - 23 mg/dL Final     Creatinine   Date Value Ref Range Status   09/24/2020 1.0 0.5 - 1.4 mg/dL Final     Calcium   Date Value Ref Range Status   09/24/2020 9.3 8.7 - 10.5 mg/dL Final     Total Protein   Date Value Ref Range Status   09/24/2020 7.2 6.0 - 8.4 g/dL Final     Albumin   Date Value Ref Range Status   09/24/2020 3.0 (L) 3.5 - 5.2 g/dL Final     Total Bilirubin   Date Value Ref Range Status   09/24/2020 0.5 0.1 - 1.0 mg/dL Final     Comment:     For infants and newborns, interpretation of results should be based  on gestational age, weight and in agreement with clinical  observations.  Premature Infant recommended reference ranges:  Up to 24 hours.............<8.0 mg/dL  Up to 48 hours............<12.0 mg/dL  3-5 days..................<15.0 mg/dL  6-29 days.................<15.0 mg/dL       Alkaline Phosphatase   Date Value Ref Range Status   09/24/2020 73 55 - 135 U/L Final     AST   Date Value Ref Range Status   09/24/2020 26 10 - 40 U/L Final     ALT   Date Value Ref Range Status   09/24/2020 23 10 - 44 U/L Final     Anion Gap   Date Value Ref Range Status   09/24/2020 15 8 - 16 mmol/L Final     eGFR if    Date Value Ref Range Status   09/24/2020 >60.0 >60 mL/min/1.73 m^2 Final     eGFR if non    Date Value Ref Range Status   09/24/2020 >60.0 >60 mL/min/1.73 m^2 Final     Comment:     Calculation used to obtain the estimated glomerular filtration  rate (eGFR) is the CKD-EPI equation.        No results found for: CEA  Lab Results   Component Value Date    PSA 0.53 05/14/2010    PSA 0.44 12/16/2008    PSA 0.3 11/03/2007           Assessment/Plan:     Problem List Items Addressed This Visit     Malignant neoplasm of upper lobe of right lung     Patient has decided that he does not want to pursue further therapy at this time.  We discussed end of life issues  and I stated he likely had between 6 and 8 months of expected survival at this time.      He would like to be enrolled in hospice.  I reviewed this service with him and what they offer.  I will have this arranged for him.      Can return with me prn.                 Discussion:     Follow up if symptoms worsen or fail to improve.      Electronically signed by Blake Hartman

## 2021-12-03 NOTE — ED NOTES
Nephrology Upon discharge, patient is AAOx4, no cardiac or respiratory complications. Follow up care reviewed with patient and has been instructed to return to the ER if needed. Patient verbalized understanding and ambulated to the lobby without difficulty. KISHOR COYLE    Patient is aware that he has an appointment on Monday at [facility] OP at 11 am. Patient verbalized understanding. Patient has no difficulty breathing.    Endocrinology

## (undated) DEVICE — NEEDLE SAFETY ECLIPSE 25G 1-1/2IN 305767

## (undated) DEVICE — SWABSTICK BENZOIN S42450

## (undated) DEVICE — PREP CHLORA 26ML

## (undated) DEVICE — PENCIL BOVIE E2515H

## (undated) DEVICE — SUTURE MONOCRYL 4-0 PS-2 Y496G

## (undated) DEVICE — PACK BASIC V 88151

## (undated) DEVICE — GLOVE BIOGEL MICRO SURGEON PINK SZ 6.5

## (undated) DEVICE — BAG DECANTER 10-102

## (undated) DEVICE — GOWN SMART LRG 044673

## (undated) DEVICE — GLOVE BIOGEL PI ORTHO PRO BROWN SZ 7.5

## (undated) DEVICE — SOLUTION IRRI NS BOTTLE 1000ML R5200-01

## (undated) DEVICE — DRAPE LAPAROTOMY TRANSVERSE 89281

## (undated) DEVICE — SPONGE GAUZE 2S 4X4 STERILE NON21424

## (undated) DEVICE — TIP BOVIE TEFLON E1450X

## (undated) DEVICE — TRAY MINOR SLIDELL MEMORIAL HOSPITAL

## (undated) DEVICE — SUTURE PROLENE 4-0 SH 36 8521H

## (undated) DEVICE — COVER LIGHT HANDLE LB53

## (undated) DEVICE — SUTURE MONOCRYL 4-0 27 SH MCP415H

## (undated) DEVICE — STERISTRIP 1/4 SKIN CLOSURE

## (undated) DEVICE — DRAPE C-ARM (FITS NEW C-ARM) 07-CA108

## (undated) DEVICE — UNDERGLOVE BIOGEL PI MICRO BLUE SZ 6.5

## (undated) DEVICE — DRESSING TEGADERM 4X4 3/4 TD1004

## (undated) DEVICE — SYRINGE 10ML 302995